# Patient Record
Sex: FEMALE | Race: WHITE | NOT HISPANIC OR LATINO | Employment: OTHER | URBAN - METROPOLITAN AREA
[De-identification: names, ages, dates, MRNs, and addresses within clinical notes are randomized per-mention and may not be internally consistent; named-entity substitution may affect disease eponyms.]

---

## 2017-03-20 DIAGNOSIS — Z12.31 ENCOUNTER FOR SCREENING MAMMOGRAM FOR MALIGNANT NEOPLASM OF BREAST: ICD-10-CM

## 2017-03-22 ENCOUNTER — ALLSCRIPTS OFFICE VISIT (OUTPATIENT)
Dept: OTHER | Facility: OTHER | Age: 72
End: 2017-03-22

## 2017-03-22 ENCOUNTER — LAB REQUISITION (OUTPATIENT)
Dept: LAB | Facility: HOSPITAL | Age: 72
End: 2017-03-22
Payer: MEDICARE

## 2017-03-22 DIAGNOSIS — Z01.419 ENCOUNTER FOR GYNECOLOGICAL EXAMINATION WITHOUT ABNORMAL FINDING: ICD-10-CM

## 2017-03-22 PROCEDURE — G0145 SCR C/V CYTO,THINLAYER,RESCR: HCPCS | Performed by: OBSTETRICS & GYNECOLOGY

## 2017-03-30 LAB
LAB AP GYN PRIMARY INTERPRETATION: NORMAL
Lab: NORMAL

## 2017-07-28 ENCOUNTER — HOSPITAL ENCOUNTER (OUTPATIENT)
Dept: MAMMOGRAPHY | Facility: MEDICAL CENTER | Age: 72
Discharge: HOME | End: 2017-07-28
Payer: MEDICARE

## 2017-07-28 DIAGNOSIS — Z12.31 ENCOUNTER FOR SCREENING MAMMOGRAM FOR MALIGNANT NEOPLASM OF BREAST: ICD-10-CM

## 2017-07-28 PROCEDURE — G0202 SCR MAMMO BI INCL CAD: HCPCS

## 2018-01-05 ENCOUNTER — TRANSCRIBE ORDERS (OUTPATIENT)
Dept: RADIOLOGY | Facility: MEDICAL CENTER | Age: 73
End: 2018-01-05

## 2018-01-05 ENCOUNTER — GENERIC CONVERSION - ENCOUNTER (OUTPATIENT)
Dept: OTHER | Facility: OTHER | Age: 73
End: 2018-01-05

## 2018-01-05 ENCOUNTER — APPOINTMENT (OUTPATIENT)
Dept: LAB | Facility: MEDICAL CENTER | Age: 73
End: 2018-01-05
Payer: MEDICARE

## 2018-01-05 ENCOUNTER — APPOINTMENT (OUTPATIENT)
Dept: RADIOLOGY | Facility: MEDICAL CENTER | Age: 73
End: 2018-01-05
Payer: MEDICARE

## 2018-01-05 DIAGNOSIS — N20.0 CALCULUS OF KIDNEY: ICD-10-CM

## 2018-01-05 DIAGNOSIS — M1A.9XX0 CHRONIC GOUT WITHOUT TOPHUS, UNSPECIFIED CAUSE, UNSPECIFIED SITE: ICD-10-CM

## 2018-01-05 DIAGNOSIS — M1A.9XX0 CHRONIC GOUT WITHOUT TOPHUS, UNSPECIFIED CAUSE, UNSPECIFIED SITE: Primary | ICD-10-CM

## 2018-01-05 LAB
ALBUMIN SERPL BCP-MCNC: 4 G/DL (ref 3.5–5)
ALP SERPL-CCNC: 69 U/L (ref 46–116)
ALT SERPL W P-5'-P-CCNC: 34 U/L (ref 12–78)
ANION GAP SERPL CALCULATED.3IONS-SCNC: 5 MMOL/L (ref 4–13)
AST SERPL W P-5'-P-CCNC: 23 U/L (ref 5–45)
BASOPHILS # BLD AUTO: 0.03 THOUSANDS/ΜL (ref 0–0.1)
BASOPHILS NFR BLD AUTO: 1 % (ref 0–1)
BILIRUB SERPL-MCNC: 0.6 MG/DL (ref 0.2–1)
BUN SERPL-MCNC: 13 MG/DL (ref 5–25)
CALCIUM SERPL-MCNC: 9.4 MG/DL (ref 8.3–10.1)
CHLORIDE SERPL-SCNC: 102 MMOL/L (ref 100–108)
CO2 SERPL-SCNC: 31 MMOL/L (ref 21–32)
CREAT SERPL-MCNC: 0.86 MG/DL (ref 0.6–1.3)
EOSINOPHIL # BLD AUTO: 0.22 THOUSAND/ΜL (ref 0–0.61)
EOSINOPHIL NFR BLD AUTO: 4 % (ref 0–6)
ERYTHROCYTE [DISTWIDTH] IN BLOOD BY AUTOMATED COUNT: 13.5 % (ref 11.6–15.1)
GFR SERPL CREATININE-BSD FRML MDRD: 69 ML/MIN/1.73SQ M
GLUCOSE P FAST SERPL-MCNC: 110 MG/DL (ref 65–99)
HCT VFR BLD AUTO: 45.7 % (ref 34.8–46.1)
HGB BLD-MCNC: 15.2 G/DL (ref 11.5–15.4)
LYMPHOCYTES # BLD AUTO: 1.97 THOUSANDS/ΜL (ref 0.6–4.47)
LYMPHOCYTES NFR BLD AUTO: 32 % (ref 14–44)
MCH RBC QN AUTO: 32.1 PG (ref 26.8–34.3)
MCHC RBC AUTO-ENTMCNC: 33.3 G/DL (ref 31.4–37.4)
MCV RBC AUTO: 97 FL (ref 82–98)
MONOCYTES # BLD AUTO: 0.45 THOUSAND/ΜL (ref 0.17–1.22)
MONOCYTES NFR BLD AUTO: 7 % (ref 4–12)
NEUTROPHILS # BLD AUTO: 3.45 THOUSANDS/ΜL (ref 1.85–7.62)
NEUTS SEG NFR BLD AUTO: 56 % (ref 43–75)
NRBC BLD AUTO-RTO: 0 /100 WBCS
PLATELET # BLD AUTO: 263 THOUSANDS/UL (ref 149–390)
PMV BLD AUTO: 11.5 FL (ref 8.9–12.7)
POTASSIUM SERPL-SCNC: 3.9 MMOL/L (ref 3.5–5.3)
PROT SERPL-MCNC: 7.8 G/DL (ref 6.4–8.2)
RBC # BLD AUTO: 4.73 MILLION/UL (ref 3.81–5.12)
SODIUM SERPL-SCNC: 138 MMOL/L (ref 136–145)
URATE SERPL-MCNC: 5.6 MG/DL (ref 2–6.8)
WBC # BLD AUTO: 6.13 THOUSAND/UL (ref 4.31–10.16)

## 2018-01-05 PROCEDURE — 36415 COLL VENOUS BLD VENIPUNCTURE: CPT

## 2018-01-05 PROCEDURE — 84550 ASSAY OF BLOOD/URIC ACID: CPT

## 2018-01-05 PROCEDURE — 74018 RADEX ABDOMEN 1 VIEW: CPT

## 2018-01-05 PROCEDURE — 80053 COMPREHEN METABOLIC PANEL: CPT

## 2018-01-05 PROCEDURE — 85025 COMPLETE CBC W/AUTO DIFF WBC: CPT

## 2018-01-15 ENCOUNTER — ALLSCRIPTS OFFICE VISIT (OUTPATIENT)
Dept: OTHER | Facility: OTHER | Age: 73
End: 2018-01-15

## 2018-01-15 ENCOUNTER — TRANSCRIBE ORDERS (OUTPATIENT)
Dept: ADMINISTRATIVE | Facility: HOSPITAL | Age: 73
End: 2018-01-15

## 2018-01-15 VITALS
DIASTOLIC BLOOD PRESSURE: 80 MMHG | SYSTOLIC BLOOD PRESSURE: 128 MMHG | HEIGHT: 67 IN | BODY MASS INDEX: 34.1 KG/M2 | WEIGHT: 217.25 LBS

## 2018-01-15 DIAGNOSIS — N20.0 URIC ACID NEPHROLITHIASIS: Primary | ICD-10-CM

## 2018-01-15 LAB
BILIRUB UR QL STRIP: NORMAL
CLARITY UR: NORMAL
COLOR UR: YELLOW
GLUCOSE (HISTORICAL): NORMAL
HGB UR QL STRIP.AUTO: NORMAL
KETONES UR STRIP-MCNC: NORMAL MG/DL
LEUKOCYTE ESTERASE UR QL STRIP: NORMAL
NITRITE UR QL STRIP: NORMAL
PH UR STRIP.AUTO: 5 [PH]
PROT UR STRIP-MCNC: NORMAL MG/DL
SP GR UR STRIP.AUTO: 1.01
UROBILINOGEN UR QL STRIP.AUTO: NORMAL

## 2018-01-16 NOTE — CONSULTS
Assessment   1  Nephrolithiasis (592 0) (N20 0)    Plan   Nephrolithiasis    · * XR ABDOMEN 1 VIEW KUB; Status:Active; Requested XMO:[id number];    Perform:[facility]; Due:02Myh5670;Ordered;For:Nephrolithiasis; Ordered By:Jesica Fu;   · Urine Dip Non-Automated- POC; Status:Complete - Retrospective By Protocol    Authorization;   Done: 48CWX1786 08:59AM   Performed: In Office; OSL:[id number]; Last Updated Isabel Rosenberg; 1/15/2018 8:59:58 AM;Ordered;For:Nephrolithiasis; Ordered By:Jesica Fu;   · [address] [place]; Status:Hold For - Scheduling; Requested XGS:[id number];    Perform:[facility]; Order Comments: With ureteral jets please; Due:67Npc4834;Ordered;For:Nephrolithiasis; Ordered By:Jesica Fu;   · Follow-up visit in 1 year Evaluation and Treatment  Follow-up  Status: Hold For -    Scheduling  Requested for: 33ZFB1047   Ordered; For: Nephrolithiasis; Ordered By: Jennifer Mckay Performed:  Due: 22IHY1585    Discussion/Summary   Discussion Summary:    Patient has a nonobstructing left-sided renal calculus that measures 3 mm on this years KUB  This stone has been present since 2009  The patient has never had a stone episode or required stone surgery in the past  However, her  is a frequent stone former and she is concerned about her stone  She would like routine surveillance and so I have recommended following her in 1 year with a KUB and ultrasound  have discussed stone precautions  reviewed with the patient urgent stones symptoms that should prompt emergent evaluation  These include intractable flank pain that does not respond to oral medications, nausea and vomiting that prevents intake of oral fluids, fevers greater than 100 3 measured by thermometer, or large amounts of blood in the urine  reviewed with the patient some general lifestyle and dietary modifications that can improve and prevent stone formation   This includes a large volume of water intake, approximately 1 5-2 L per day  Addition of citric acid with homemade lemonade or orange juice can help to it inhibit stone formation  Decreased sodium in the form of table salt and as sodium in prepared foods as well as decreased animal protein with approximately 1 palm-sized portion per meal can both lead to decrease in overall stone formation  the patient is able to maintain some of these lifestyle modifications and still has recurrent stone formation we discussed the need for a metabolic stone evaluation  patient is taking calcium supplementation for her osteopenia  I have recommended calcium citrate  will see her in 1 year  She knows to call me sooner with questions or concerns  Patient's Capacity to Self-Care: Patient is able to Self-Care  Goals and Barriers: The patient has the current Goals: Continued surveillance of her nonobstructing left-sided renal calculus  The patent has the current Barriers:    Self Referrals:    Self Referrals: Yes  is a pt      Chief Complaint   Chief Complaint Free Text Note Form: Pt here for nephrolithiasis      History of Present Illness   HPI: 51-year-old female with a history of a small nonobstructing left-sided stone  She was previously seen by an outside urologist  She has never passed stones that she has recognized or required stone surgery  She presents today to establish urologic care  Review of Systems   Complete-Female Urology:      Genitourinary: Empty sensation-- and-- stream quality good, but-- no dysuria,-- no urinary hesitancy,-- no feelings of urinary urgency,-- no incontinence,-- no hematuria-- and-- no nocturia  ROS Reviewed:    ROS reviewed  Active Problems   1  Decreased libido (799 81) (R68 82)   2  Encounter for routine gynecological examination with Papanicolaou smear of cervix     (V72 31,V76 2) (Z01 419)   3  Encounter for screening mammogram for malignant neoplasm of breast (V76 12)     (Z12 31)   4  Lichen sclerosus (008 0) (L90 0)   5  Nephrolithiasis (592 0) (N20 0)   6  Osteopenia (733 90) (M85 80)   7  Routine Gynecological Exam With Cervical Pap Smear (V72 31)   8  Visit for screening mammogram (V76 12) (Z12 31)    Past Medical History   1  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)   2  History of Gout (274 9) (M10 9)   3  History of hypertension (V12 59) (Z86 79)   4  History of hypothyroidism (V12 29) (Z86 39)   5  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)   6  History of Summary Of Previous Pregnancies  3  (Total No )   7  History of Summary Of Previous Pregnancies Para 3  (Deliveries)  Active Problems And Past Medical History Reviewed: The active problems and past medical history were reviewed and updated today  Surgical History   1  History of Colonoscopy (Fiberoptic) Screening   2  History of Gallbladder Surgery   3  History of Salpingo-oophorectomy Right Side   4  History of Tubal Ligation   5  History of Varicose Vein Ligation  Surgical History Reviewed: The surgical history was reviewed and updated today  Family History   Family History    1  Family history of Diabetes Mellitus (V18 0)   2  Family history of Heart Disease (V17 49)   3  Family history of Hypertension (V17 49)  Family History Reviewed: The family history was reviewed and updated today  Social History    · Child's Family History   · Exercising Regularly   · Marital History - Currently    · Never A Smoker  Social History Reviewed: The social history was reviewed and updated today  Current Meds    1  Adult Aspirin Low Strength 81 MG TBDP; Therapy: (Recorded:2018) to Recorded   2  Allopurinol 300 MG Oral Tablet Recorded   3  Betamethasone Dipropionate 0 05 % External Lotion; Therapy: (Recorded:2018) to Recorded   4  Blink Tears SOLN;     Therapy: (Recorded:2018) to Recorded   5   Clobetasol Propionate 0 05 % External Cream; APPLY SPARINGLY TO AFFECTED     AREA(S) TWICE DAILY; Therapy: 83SSB8572 to (Evaluate:48Fpw0630)  Requested for: 39MWH2967; Last     Rx:25Jan2017 Ordered   6  Fish Oil CAPS; Therapy: (Recorded:15Jan2018) to Recorded   7  Levoxyl 75 MCG Oral Tablet; Therapy: 51LWZ8157 to (Last Rx:85Fxd4520)  Requested for: 26Wdb3096 Ordered   8  Lisinopril-Hydrochlorothiazide 20-12 5 MG Oral Tablet; Therapy: 83FMK6338 to (Last Rx:85Awk9621)  Requested for: 44Wxy7194 Ordered   9  Loratadine CAPS; Therapy: (Recorded:15Jan2018) to Recorded   10  Multiple Vitamins Oral Tablet Recorded   11  Neomycin-Polymyxin-HC 3 5-45861-7 Otic Suspension; Therapy: 20YMR9728 to (Last AV:05GQN9240)  Requested for: 61WMR2251      Ordered   12  Oscal 500/200 D-3 500-200 MG-UNIT Oral Tablet; Therapy: (Recorded:15Jan2018) to Recorded   13  Restasis 0 05 % Ophthalmic Emulsion Recorded   14  SUMAtriptan Succinate 50 MG Oral Tablet; Therapy: 09QVB5866 to (Last NQ:09JJN7597)  Requested for: 75PVJ7396 Ordered   15  Vitamin C CAPS; Therapy: (Recorded:15Jan2018) to Recorded   16  Vitamin D CAPS; Therapy: (Recorded:15Jan2018) to Recorded  Medication List Reviewed: The medication list was reviewed and updated today  Allergies   1  Voltaren GEL   2  Penicillins  3  Gluten    Vitals   Vital Signs    Recorded: 10EZQ1432 08:50AM   Heart Rate 88   Systolic 922   Diastolic 80   Height 5 ft 6 in   Weight 213 lb    BMI Calculated 34 38   BSA Calculated 2 05     Physical Exam        Constitutional      General appearance: No acute distress, well appearing and well nourished  Pulmonary      Respiratory effort: No increased work of breathing or signs of respiratory distress  Cardiovascular      Examination of extremities for edema and/or varicosities: Normal        Abdomen      Abdomen: Non-tender, no masses         Musculoskeletal      Gait and station: Normal        Skin      Skin and subcutaneous tissue: Normal without rashes or lesions  Results/Data   Urine Dip Non-Automated- POC 12IWP6603 08:59AM Kinjal White      Test Name Result Flag Reference   Color Yellow     Clarity Transparent     Leukocytes -     Nitrite -     Blood -     Bilirubin -     Urobilinogen -     Protein -     Ph 5 0     Specific Gravity 1 015     Ketone -     Glucose -        (1) URIC ACID 07BYU0361 08:01AM EPIC, Provider   Test ordered by: Rachel Barrett      Test Name Result Flag Reference   URIC ACID 5 6 mg/dL  2 0-6 8   Specimen collection should occur prior to Metamizole administration due to the potential for falsely depressed results  (1) COMPREHENSIVE METABOLIC PANEL 32DPF6505 08:60OL EPIC, Provider   Test ordered by: Rachel Barrett      Test Name Result Flag Reference   SODIUM 138 mmol/L  136-145   POTASSIUM 3 9 mmol/L  3 5-5 3   CHLORIDE 102 mmol/L  100-108   CARBON DIOXIDE 31 mmol/L  21-32   ANION GAP (CALC) 5 mmol/L  4-13   BLOOD UREA NITROGEN 13 mg/dL  5-25   CREATININE 0 86 mg/dL  0 60-1 30   Standardized to IDMS reference method   CALCIUM 9 4 mg/dL  8 3-10 1   BILI, TOTAL 0 60 mg/dL  0 20-1 00   ALK PHOSPHATAS 69 U/L     ALT (SGPT) 34 U/L  12-78   Specimen collection should occur prior to Sulfasalazine and/or Sulfapyridine administration due to the potential for falsely depressed results  AST(SGOT) 23 U/L  5-45   Specimen collection should occur prior to Sulfasalazine administration due to the potential for falsely depressed results  ALBUMIN 4 0 g/dL  3 5-5 0   TOTAL PROTEIN 7 8 g/dL  6 4-8 2   eGFR 69 ml/min/1 73sq m     This is a patient instruction: Patient fasting for 8 hours or longer recommended  National Kidney Disease Education Program recommendations are as follows:     GFR calculation is accurate only with a steady state creatinine     Chronic Kidney disease less than 60 ml/min/1 73 sq  meters     Kidney failure less than 15 ml/min/1 73 sq  meters     GLUCOSE FASTING 110 mg/dL H 65-99   Specimen collection should occur prior to Sulfasalazine administration due to the potential for falsely depressed results  Specimen collection should occur prior to Sulfapyridine administration due to the potential for falsely elevated results  * XR ABDOMEN 1 VIEW KUB 34FGA7148 08:00AM EPIC, Provider   Test ordered by: Dominique Hodges      Test Name Result Flag Reference   XR ABDOMEN 1 VIEW KUB (Report)     ABDOMEN           INDICATION: History of kidney stones  COMPARISON: 9/11/2012           VIEWS: AP supine           IMAGES: 2           FINDINGS:           There is a nonobstructive bowel gas pattern  No discernible free air on this supine study  Upright or left lateral decubitus imaging is more sensitive to detect subtle free air in the appropriate setting  Calcified granulomas projecting over the liver  Prior cholecystectomy  Rounded calcification projecting over the left renal upper pole, the largest measuring 3 mm  No stones demonstrated along the expected courses of the ureters  Pelvic phleboliths       are unchanged in appearance without new stone concern for distal ureterolith  Visualized lung bases are clear  Lumbar spine degenerative changes  IMPRESSION:           Left nephrolithiasis  No ureteral stones or other acute findings                  Workstation performed: [id number]           Signed by:      Cata Stacy MD      1/5/18      Signatures    Electronically signed by : JORJE Schmidt ; Clem 15 2018  9:12AM EST                       (Author)

## 2018-01-22 VITALS
SYSTOLIC BLOOD PRESSURE: 142 MMHG | BODY MASS INDEX: 34.23 KG/M2 | HEIGHT: 66 IN | WEIGHT: 213 LBS | DIASTOLIC BLOOD PRESSURE: 80 MMHG | HEART RATE: 88 BPM

## 2018-03-30 ENCOUNTER — ANNUAL EXAM (OUTPATIENT)
Dept: GYNECOLOGY | Facility: CLINIC | Age: 73
End: 2018-03-30
Payer: MEDICARE

## 2018-03-30 VITALS
WEIGHT: 214.6 LBS | DIASTOLIC BLOOD PRESSURE: 88 MMHG | SYSTOLIC BLOOD PRESSURE: 130 MMHG | BODY MASS INDEX: 34.49 KG/M2 | HEIGHT: 66 IN

## 2018-03-30 DIAGNOSIS — M81.0 OSTEOPOROSIS, UNSPECIFIED OSTEOPOROSIS TYPE, UNSPECIFIED PATHOLOGICAL FRACTURE PRESENCE: Primary | ICD-10-CM

## 2018-03-30 DIAGNOSIS — Z78.0 MENOPAUSE: ICD-10-CM

## 2018-03-30 DIAGNOSIS — L90.0 LICHEN SCLEROSUS: ICD-10-CM

## 2018-03-30 DIAGNOSIS — Z12.31 ENCOUNTER FOR SCREENING MAMMOGRAM FOR MALIGNANT NEOPLASM OF BREAST: ICD-10-CM

## 2018-03-30 PROCEDURE — 99213 OFFICE O/P EST LOW 20 MIN: CPT | Performed by: OBSTETRICS & GYNECOLOGY

## 2018-03-30 RX ORDER — NEOMYCIN SULFATE, POLYMYXIN B SULFATE AND HYDROCORTISONE 10; 3.5; 1 MG/ML; MG/ML; [USP'U]/ML
SUSPENSION/ DROPS AURICULAR (OTIC)
COMMUNITY
Start: 2011-06-03

## 2018-03-30 RX ORDER — SUMATRIPTAN 50 MG/1
TABLET, FILM COATED ORAL
COMMUNITY
Start: 2011-09-27

## 2018-03-30 RX ORDER — LEVOTHYROXINE SODIUM 0.07 MG/1
TABLET ORAL
COMMUNITY
Start: 2018-01-12

## 2018-03-30 RX ORDER — LISINOPRIL AND HYDROCHLOROTHIAZIDE 12.5; 1 MG/1; MG/1
TABLET ORAL
COMMUNITY
Start: 2018-02-22

## 2018-03-30 RX ORDER — CLOBETASOL PROPIONATE 0.5 MG/G
CREAM TOPICAL 2 TIMES DAILY
Qty: 30 G | Refills: 3 | Status: SHIPPED | OUTPATIENT
Start: 2018-03-30 | End: 2020-11-27 | Stop reason: SDUPTHER

## 2018-03-30 RX ORDER — ALLOPURINOL 100 MG/1
TABLET ORAL
COMMUNITY
Start: 2018-02-22

## 2018-03-30 RX ORDER — CLOBETASOL PROPIONATE 0.5 MG/G
CREAM TOPICAL 2 TIMES DAILY
COMMUNITY
Start: 2016-01-11 | End: 2018-03-30 | Stop reason: SDUPTHER

## 2018-03-30 NOTE — PROGRESS NOTES
Assessment/Plan:         Diagnoses and all orders for this visit:    Osteoporosis, unspecified osteoporosis type, unspecified pathological fracture presence  -     Cancel: DXA bone density spine hip and pelvis; Future    Menopause  -     Cancel: DXA bone density spine hip and pelvis; Future    Encounter for screening mammogram for malignant neoplasm of breast  -     Mammo screening bilateral w 3d & cad; Future    Lichen sclerosus  -     clobetasol (TEMOVATE) 0 05 % cream; Apply topically 2 (two) times a day for 14 days    Other orders  -     Discontinue: clobetasol (TEMOVATE) 0 05 % cream; Apply topically 2 (two) times a day  -     allopurinol (ZYLOPRIM) 100 mg tablet;   -     Aspirin 81 MG EC tablet; Take by mouth  -     Omega-3 Fatty Acids (FISH OIL) 645 MG CAPS; Take by mouth  -     levothyroxine 75 mcg tablet;   -     lisinopril-hydrochlorothiazide (PRINZIDE,ZESTORETIC) 10-12 5 MG per tablet;   -     neomycin-polymyxin-hydrocortisone (CORTISPORIN) 0 35%-10,000 units/mL-1% otic suspension; Administer into ears  -     SUMAtriptan (IMITREX) 50 mg tablet; Take by mouth        Subjective:      Patient ID: Ciro Found is a 72 y o  female  Has lichen sclerosus  Gets flare approximately 3-4 times/ year  Presently having flare  Responds well to clobetasol  No vaginal discharge, bleeding, fever  No urinary complaints  No abdominal pain    HPI    The following portions of the patient's history were reviewed and updated as appropriate: allergies, current medications, past family history, past medical history, past social history, past surgical history and problem list     Review of Systems   Constitutional: Negative  Gastrointestinal: Negative  Genitourinary: Negative for difficulty urinating, dysuria, frequency, hematuria, pelvic pain, urgency, vaginal bleeding, vaginal discharge and vaginal pain     Skin:        Lichen sclerosus         Objective:      /88 (BP Location: Right arm, Patient Position: Sitting, Cuff Size: Standard)   Ht 5' 6 14" (1 68 m)   Wt 97 3 kg (214 lb 9 6 oz)   BMI 34 49 kg/m²          Physical Exam   Constitutional: She appears well-developed and well-nourished  Neck: Normal range of motion  Neck supple  No thyromegaly present  Cardiovascular: Normal rate and regular rhythm  Pulmonary/Chest: Effort normal and breath sounds normal  Right breast exhibits no inverted nipple, no mass, no nipple discharge, no skin change and no tenderness  Left breast exhibits no inverted nipple, no mass, no nipple discharge, no skin change and no tenderness  Abdominal: Soft  Bowel sounds are normal  She exhibits no mass  There is no tenderness  There is no rebound  Hernia confirmed negative in the right inguinal area and confirmed negative in the left inguinal area  Genitourinary: Uterus normal  There is no rash or lesion on the right labia  There is no rash or lesion on the left labia  Cervix exhibits no motion tenderness, no discharge and no friability  Right adnexum displays no mass, no tenderness and no fullness  Left adnexum displays no mass, no tenderness and no fullness  There is erythema in the vagina  No tenderness or bleeding in the vagina  No vaginal discharge found  Genitourinary Comments: Present lichen flare   Lymphadenopathy:        Right: No inguinal adenopathy present  Left: No inguinal adenopathy present

## 2018-04-03 ENCOUNTER — APPOINTMENT (OUTPATIENT)
Dept: LAB | Facility: MEDICAL CENTER | Age: 73
End: 2018-04-03
Payer: MEDICARE

## 2018-04-03 ENCOUNTER — TRANSCRIBE ORDERS (OUTPATIENT)
Dept: RADIOLOGY | Facility: MEDICAL CENTER | Age: 73
End: 2018-04-03

## 2018-04-03 DIAGNOSIS — E07.9 THYROID DISORDER: ICD-10-CM

## 2018-04-03 DIAGNOSIS — I10 HYPERTENSION, UNSPECIFIED TYPE: ICD-10-CM

## 2018-04-03 DIAGNOSIS — I10 HYPERTENSION, UNSPECIFIED TYPE: Primary | ICD-10-CM

## 2018-04-03 LAB
25(OH)D3 SERPL-MCNC: 25.3 NG/ML (ref 30–100)
CHOLEST SERPL-MCNC: 154 MG/DL (ref 50–200)
ERYTHROCYTE [DISTWIDTH] IN BLOOD BY AUTOMATED COUNT: 13.7 % (ref 11.6–15.1)
EST. AVERAGE GLUCOSE BLD GHB EST-MCNC: 126 MG/DL
HBA1C MFR BLD: 6 % (ref 4.2–6.3)
HCT VFR BLD AUTO: 42 % (ref 34.8–46.1)
HDLC SERPL-MCNC: 35 MG/DL (ref 40–60)
HGB BLD-MCNC: 14.2 G/DL (ref 11.5–15.4)
LDLC SERPL CALC-MCNC: 94 MG/DL (ref 0–100)
MCH RBC QN AUTO: 32.2 PG (ref 26.8–34.3)
MCHC RBC AUTO-ENTMCNC: 33.8 G/DL (ref 31.4–37.4)
MCV RBC AUTO: 95 FL (ref 82–98)
PLATELET # BLD AUTO: 253 THOUSANDS/UL (ref 149–390)
PMV BLD AUTO: 11.4 FL (ref 8.9–12.7)
RBC # BLD AUTO: 4.41 MILLION/UL (ref 3.81–5.12)
T4 FREE SERPL-MCNC: 0.9 NG/DL (ref 0.76–1.46)
TRIGL SERPL-MCNC: 125 MG/DL
TSH SERPL DL<=0.05 MIU/L-ACNC: 1.98 UIU/ML (ref 0.36–3.74)
WBC # BLD AUTO: 6.82 THOUSAND/UL (ref 4.31–10.16)

## 2018-04-03 PROCEDURE — 84439 ASSAY OF FREE THYROXINE: CPT

## 2018-04-03 PROCEDURE — 85027 COMPLETE CBC AUTOMATED: CPT

## 2018-04-03 PROCEDURE — 83036 HEMOGLOBIN GLYCOSYLATED A1C: CPT

## 2018-04-03 PROCEDURE — 82306 VITAMIN D 25 HYDROXY: CPT

## 2018-04-03 PROCEDURE — 84443 ASSAY THYROID STIM HORMONE: CPT

## 2018-04-03 PROCEDURE — 80061 LIPID PANEL: CPT

## 2018-04-03 PROCEDURE — 36415 COLL VENOUS BLD VENIPUNCTURE: CPT

## 2018-04-18 ENCOUNTER — APPOINTMENT (OUTPATIENT)
Dept: LAB | Facility: MEDICAL CENTER | Age: 73
End: 2018-04-18
Payer: MEDICARE

## 2018-04-18 ENCOUNTER — TRANSCRIBE ORDERS (OUTPATIENT)
Dept: RADIOLOGY | Facility: MEDICAL CENTER | Age: 73
End: 2018-04-18

## 2018-04-18 DIAGNOSIS — K90.9 INTESTINAL MALABSORPTION, UNSPECIFIED TYPE: Primary | ICD-10-CM

## 2018-04-18 DIAGNOSIS — K90.9 INTESTINAL MALABSORPTION, UNSPECIFIED TYPE: ICD-10-CM

## 2018-04-18 PROCEDURE — 83516 IMMUNOASSAY NONANTIBODY: CPT

## 2018-04-18 PROCEDURE — 82784 ASSAY IGA/IGD/IGG/IGM EACH: CPT

## 2018-04-18 PROCEDURE — 36415 COLL VENOUS BLD VENIPUNCTURE: CPT

## 2018-04-18 PROCEDURE — 86255 FLUORESCENT ANTIBODY SCREEN: CPT

## 2018-04-19 LAB
ENDOMYSIUM IGA SER QL: NEGATIVE
GLIADIN PEPTIDE IGA SER-ACNC: 8 UNITS (ref 0–19)
GLIADIN PEPTIDE IGG SER-ACNC: 4 UNITS (ref 0–19)
IGA SERPL-MCNC: 210 MG/DL (ref 64–422)
TTG IGA SER-ACNC: 2 U/ML (ref 0–3)
TTG IGA SER-ACNC: <2 U/ML (ref 0–3)
TTG IGG SER-ACNC: <2 U/ML (ref 0–5)
TTG IGG SER-ACNC: <2 U/ML (ref 0–5)

## 2018-09-07 ENCOUNTER — HOSPITAL ENCOUNTER (OUTPATIENT)
Dept: MAMMOGRAPHY | Facility: HOSPITAL | Age: 73
Discharge: HOME | End: 2018-09-07
Payer: MEDICARE

## 2018-09-07 DIAGNOSIS — Z12.31 ENCOUNTER FOR SCREENING MAMMOGRAM FOR MALIGNANT NEOPLASM OF BREAST: ICD-10-CM

## 2018-09-07 PROCEDURE — 77067 SCR MAMMO BI INCL CAD: CPT

## 2018-09-07 PROCEDURE — 77063 BREAST TOMOSYNTHESIS BI: CPT

## 2018-11-09 ENCOUNTER — APPOINTMENT (OUTPATIENT)
Dept: LAB | Facility: MEDICAL CENTER | Age: 73
End: 2018-11-09
Payer: MEDICARE

## 2018-11-09 ENCOUNTER — TRANSCRIBE ORDERS (OUTPATIENT)
Dept: RADIOLOGY | Facility: MEDICAL CENTER | Age: 73
End: 2018-11-09

## 2018-11-09 DIAGNOSIS — M1A.9XX0 CHRONIC GOUT WITHOUT TOPHUS, UNSPECIFIED CAUSE, UNSPECIFIED SITE: ICD-10-CM

## 2018-11-09 DIAGNOSIS — M1A.9XX0 CHRONIC GOUT WITHOUT TOPHUS, UNSPECIFIED CAUSE, UNSPECIFIED SITE: Primary | ICD-10-CM

## 2018-11-09 LAB
ALBUMIN SERPL BCP-MCNC: 4 G/DL (ref 3.5–5)
ALP SERPL-CCNC: 68 U/L (ref 46–116)
ALT SERPL W P-5'-P-CCNC: 42 U/L (ref 12–78)
ANION GAP SERPL CALCULATED.3IONS-SCNC: 4 MMOL/L (ref 4–13)
AST SERPL W P-5'-P-CCNC: 26 U/L (ref 5–45)
BASOPHILS # BLD AUTO: 0.05 THOUSANDS/ΜL (ref 0–0.1)
BASOPHILS NFR BLD AUTO: 1 % (ref 0–1)
BILIRUB SERPL-MCNC: 0.49 MG/DL (ref 0.2–1)
BUN SERPL-MCNC: 14 MG/DL (ref 5–25)
CALCIUM SERPL-MCNC: 9.6 MG/DL (ref 8.3–10.1)
CHLORIDE SERPL-SCNC: 104 MMOL/L (ref 100–108)
CO2 SERPL-SCNC: 29 MMOL/L (ref 21–32)
CREAT SERPL-MCNC: 0.84 MG/DL (ref 0.6–1.3)
EOSINOPHIL # BLD AUTO: 0.35 THOUSAND/ΜL (ref 0–0.61)
EOSINOPHIL NFR BLD AUTO: 5 % (ref 0–6)
ERYTHROCYTE [DISTWIDTH] IN BLOOD BY AUTOMATED COUNT: 13.1 % (ref 11.6–15.1)
GFR SERPL CREATININE-BSD FRML MDRD: 70 ML/MIN/1.73SQ M
GLUCOSE P FAST SERPL-MCNC: 116 MG/DL (ref 65–99)
HCT VFR BLD AUTO: 44.8 % (ref 34.8–46.1)
HGB BLD-MCNC: 15 G/DL (ref 11.5–15.4)
IMM GRANULOCYTES # BLD AUTO: 0.02 THOUSAND/UL (ref 0–0.2)
IMM GRANULOCYTES NFR BLD AUTO: 0 % (ref 0–2)
LYMPHOCYTES # BLD AUTO: 2.41 THOUSANDS/ΜL (ref 0.6–4.47)
LYMPHOCYTES NFR BLD AUTO: 31 % (ref 14–44)
MCH RBC QN AUTO: 32.3 PG (ref 26.8–34.3)
MCHC RBC AUTO-ENTMCNC: 33.5 G/DL (ref 31.4–37.4)
MCV RBC AUTO: 97 FL (ref 82–98)
MONOCYTES # BLD AUTO: 0.56 THOUSAND/ΜL (ref 0.17–1.22)
MONOCYTES NFR BLD AUTO: 7 % (ref 4–12)
NEUTROPHILS # BLD AUTO: 4.41 THOUSANDS/ΜL (ref 1.85–7.62)
NEUTS SEG NFR BLD AUTO: 56 % (ref 43–75)
NRBC BLD AUTO-RTO: 0 /100 WBCS
PLATELET # BLD AUTO: 254 THOUSANDS/UL (ref 149–390)
PMV BLD AUTO: 10.9 FL (ref 8.9–12.7)
POTASSIUM SERPL-SCNC: 3.9 MMOL/L (ref 3.5–5.3)
PROT SERPL-MCNC: 7.8 G/DL (ref 6.4–8.2)
RBC # BLD AUTO: 4.64 MILLION/UL (ref 3.81–5.12)
SODIUM SERPL-SCNC: 137 MMOL/L (ref 136–145)
URATE SERPL-MCNC: 5.7 MG/DL (ref 2–6.8)
WBC # BLD AUTO: 7.8 THOUSAND/UL (ref 4.31–10.16)

## 2018-11-09 PROCEDURE — 36415 COLL VENOUS BLD VENIPUNCTURE: CPT

## 2018-11-09 PROCEDURE — 84550 ASSAY OF BLOOD/URIC ACID: CPT

## 2018-11-09 PROCEDURE — 80053 COMPREHEN METABOLIC PANEL: CPT

## 2018-11-09 PROCEDURE — 85025 COMPLETE CBC W/AUTO DIFF WBC: CPT

## 2019-01-11 ENCOUNTER — TRANSCRIBE ORDERS (OUTPATIENT)
Dept: ADMINISTRATIVE | Facility: HOSPITAL | Age: 74
End: 2019-01-11

## 2019-01-11 ENCOUNTER — APPOINTMENT (OUTPATIENT)
Dept: LAB | Facility: MEDICAL CENTER | Age: 74
End: 2019-01-11
Payer: MEDICARE

## 2019-01-11 DIAGNOSIS — I10 HYPERTENSION, UNSPECIFIED TYPE: Primary | ICD-10-CM

## 2019-01-11 DIAGNOSIS — E78.5 HYPERLIPIDEMIA, UNSPECIFIED HYPERLIPIDEMIA TYPE: ICD-10-CM

## 2019-01-11 DIAGNOSIS — E13.8 DIABETES MELLITUS OF OTHER TYPE WITH COMPLICATION, UNSPECIFIED WHETHER LONG TERM INSULIN USE: ICD-10-CM

## 2019-01-11 DIAGNOSIS — I10 HYPERTENSION, UNSPECIFIED TYPE: ICD-10-CM

## 2019-01-11 LAB
ANION GAP SERPL CALCULATED.3IONS-SCNC: 7 MMOL/L (ref 4–13)
BUN SERPL-MCNC: 14 MG/DL (ref 5–25)
CALCIUM SERPL-MCNC: 9.1 MG/DL (ref 8.3–10.1)
CHLORIDE SERPL-SCNC: 104 MMOL/L (ref 100–108)
CHOLEST SERPL-MCNC: 168 MG/DL (ref 50–200)
CO2 SERPL-SCNC: 27 MMOL/L (ref 21–32)
CREAT SERPL-MCNC: 0.81 MG/DL (ref 0.6–1.3)
EST. AVERAGE GLUCOSE BLD GHB EST-MCNC: 137 MG/DL
GFR SERPL CREATININE-BSD FRML MDRD: 73 ML/MIN/1.73SQ M
GLUCOSE P FAST SERPL-MCNC: 109 MG/DL (ref 65–99)
HBA1C MFR BLD: 6.4 % (ref 4.2–6.3)
HDLC SERPL-MCNC: 35 MG/DL (ref 40–60)
LDLC SERPL CALC-MCNC: 103 MG/DL (ref 0–100)
NONHDLC SERPL-MCNC: 133 MG/DL
POTASSIUM SERPL-SCNC: 3.9 MMOL/L (ref 3.5–5.3)
SODIUM SERPL-SCNC: 138 MMOL/L (ref 136–145)
T4 FREE SERPL-MCNC: 0.84 NG/DL (ref 0.76–1.46)
TRIGL SERPL-MCNC: 152 MG/DL
TSH SERPL DL<=0.05 MIU/L-ACNC: 1.74 UIU/ML (ref 0.36–3.74)

## 2019-01-11 PROCEDURE — 83036 HEMOGLOBIN GLYCOSYLATED A1C: CPT

## 2019-01-11 PROCEDURE — 84443 ASSAY THYROID STIM HORMONE: CPT

## 2019-01-11 PROCEDURE — 36415 COLL VENOUS BLD VENIPUNCTURE: CPT

## 2019-01-11 PROCEDURE — 84439 ASSAY OF FREE THYROXINE: CPT

## 2019-01-11 PROCEDURE — 80048 BASIC METABOLIC PNL TOTAL CA: CPT

## 2019-01-11 PROCEDURE — 80061 LIPID PANEL: CPT

## 2019-01-16 ENCOUNTER — HOSPITAL ENCOUNTER (OUTPATIENT)
Dept: RADIOLOGY | Facility: HOSPITAL | Age: 74
Discharge: HOME | End: 2019-01-16
Attending: UROLOGY
Payer: MEDICARE

## 2019-01-16 ENCOUNTER — HOSPITAL ENCOUNTER (OUTPATIENT)
Dept: ULTRASOUND IMAGING | Facility: HOSPITAL | Age: 74
Discharge: HOME | End: 2019-01-16
Attending: UROLOGY
Payer: MEDICARE

## 2019-01-16 DIAGNOSIS — N20.0 URIC ACID NEPHROLITHIASIS: ICD-10-CM

## 2019-01-16 DIAGNOSIS — N20.0 CALCULUS OF KIDNEY: ICD-10-CM

## 2019-01-16 PROCEDURE — 76770 US EXAM ABDO BACK WALL COMP: CPT

## 2019-01-16 PROCEDURE — 74018 RADEX ABDOMEN 1 VIEW: CPT

## 2019-01-25 PROBLEM — N20.0 NEPHROLITHIASIS: Status: ACTIVE | Noted: 2019-01-25

## 2019-01-28 ENCOUNTER — TELEPHONE (OUTPATIENT)
Dept: UROLOGY | Facility: MEDICAL CENTER | Age: 74
End: 2019-01-28

## 2019-01-28 NOTE — TELEPHONE ENCOUNTER
Pt calling- wanted to push out yearly, scheduled 3/26 at [facility]    Pt recently had X-Ray done 1/16, please advise if pt needs sooner apt   [phone]

## 2019-03-25 NOTE — PROGRESS NOTES
3/26/2019      Chief Complaint   Patient presents with    Nephrolithiasis       Assessment and Plan    69 y o  female managed by Dr Markell De La Rosa    1  Nephrolithiasis  - KUB and U/S reveals a stable 3mm left sided stone, a possible new left lower pole stone and bilateral right greater than left dilated right renal pelvis in the presence of ureteral jets   - discussed with the patient that her KUB and ultrasound are of no concern and we will recheck them again in 1 year  - proper hydration with 60 ounces lemon water daily     Follow up 1 year with KUB and ultrasound prior      History of Present Illness  Sana Dodson is a 69 y o  female here for follow up evaluation of nephrolithiasis  The patient presents today with a KUB and ultrasound obtained prior to her visit  Her KUB reveals a stable 3 millimeter left-sided renal calculi  Her ultrasound reveals a possible new lower pole stone, right greater than left dilated renal pelvis sees, and bilateral ureteral jets  The patient is asymptomatic and doing well from urinary standpoint  Her lower urinary tract symptoms are listed as below  Review of Systems   Constitutional: Negative for activity change, chills and fever  Gastrointestinal: Negative for abdominal distention and abdominal pain  Musculoskeletal: Negative for back pain and gait problem  Psychiatric/Behavioral: Negative for behavioral problems and confusion  Past Medical History  History reviewed  No pertinent past medical history  Past Social History  History reviewed  No pertinent surgical history  Social History     Tobacco Use   Smoking Status Never Smoker   Smokeless Tobacco Never Used       Past Family History  History reviewed  No pertinent family history      Past Social history  Social History     Socioeconomic History    Marital status: /Civil Union     Spouse name: Not on file    Number of children: Not on file    Years of education: Not on file    Highest education level: Not on file   Occupational History    Not on file   Social Needs    Financial resource strain: Not on file    Food insecurity:     Worry: Not on file     Inability: Not on file    Transportation needs:     Medical: Not on file     Non-medical: Not on file   Tobacco Use    Smoking status: Never Smoker    Smokeless tobacco: Never Used   Substance and Sexual Activity    Alcohol use: No    Drug use: No    Sexual activity: Not on file   Lifestyle    Physical activity:     Days per week: Not on file     Minutes per session: Not on file    Stress: Not on file   Relationships    Social connections:     Talks on phone: Not on file     Gets together: Not on file     Attends Mosque service: Not on file     Active member of club or organization: Not on file     Attends meetings of clubs or organizations: Not on file     Relationship status: Not on file    Intimate partner violence:     Fear of current or ex partner: Not on file     Emotionally abused: Not on file     Physically abused: Not on file     Forced sexual activity: Not on file   Other Topics Concern    Not on file   Social History Narrative    Not on file       Current Medications  Current Outpatient Medications   Medication Sig Dispense Refill    allopurinol (ZYLOPRIM) 100 mg tablet       Aspirin 81 MG EC tablet Take by mouth      levothyroxine 75 mcg tablet       lisinopril-hydrochlorothiazide (PRINZIDE,ZESTORETIC) 10-12 5 MG per tablet       neomycin-polymyxin-hydrocortisone (CORTISPORIN) 0 35%-10,000 units/mL-1% otic suspension Administer into ears      Omega-3 Fatty Acids (FISH OIL) 645 MG CAPS Take by mouth      SUMAtriptan (IMITREX) 50 mg tablet Take by mouth      clobetasol (TEMOVATE) 0 05 % cream Apply topically 2 (two) times a day for 14 days 30 g 3     No current facility-administered medications for this visit          Allergies  Allergies   Allergen Reactions    Gluten Meal     Diclofenac Rash    Penicillins Rash The following portions of the patient's history were reviewed and updated as appropriate: allergies, current medications, past medical history, past social history, past surgical history and problem list       Vitals  Vitals:    03/26/19 1327   BP: 124/70   Pulse: 71   Weight: 100 kg (221 lb)   Height: 5' 6" (1 676 m)         Physical Exam  Constitutional   General appearance: Patient is seated and in no acute distress, well appearing and well nourished  Head and Face   Head and face: Normal     Eyes   Conjunctiva and lids: No erythema, swelling or discharge  Ears, Nose, Mouth, and Throat   Hearing: Normal     Pulmonary   Respiratory effort: No increased work of breathing or signs of respiratory distress  Cardiovascular   Examination of extremities for edema and/or varicosities: Normal     Abdomen   Abdomen: Non-tender, no masses  Musculoskeletal   Gait and station: Normal    Skin   Skin and subcutaneous tissue: Warm, dry, and intact  No visible lesions or rashes  Psychiatric   Judgment and insight: Normal  Recent and remote memory:  Normal  Mood and affect: Normal      Results  No results found for this or any previous visit (from the past 1 hour(s))  ]  No results found for: PSA  Lab Results   Component Value Date    CALCIUM 9 1 01/11/2019    K 3 9 01/11/2019    CO2 27 01/11/2019     01/11/2019    BUN 14 01/11/2019    CREATININE 0 81 01/11/2019     Lab Results   Component Value Date    WBC 7 80 11/09/2018    HGB 15 0 11/09/2018    HCT 44 8 11/09/2018    MCV 97 11/09/2018     11/09/2018       Orders  No orders of the defined types were placed in this encounter

## 2019-03-26 ENCOUNTER — OFFICE VISIT (OUTPATIENT)
Dept: UROLOGY | Facility: CLINIC | Age: 74
End: 2019-03-26
Payer: MEDICARE

## 2019-03-26 VITALS
HEART RATE: 71 BPM | WEIGHT: 221 LBS | DIASTOLIC BLOOD PRESSURE: 70 MMHG | HEIGHT: 66 IN | SYSTOLIC BLOOD PRESSURE: 124 MMHG | BODY MASS INDEX: 35.52 KG/M2

## 2019-03-26 DIAGNOSIS — N20.0 NEPHROLITHIASIS: Primary | ICD-10-CM

## 2019-03-26 PROCEDURE — 99213 OFFICE O/P EST LOW 20 MIN: CPT | Performed by: PHYSICIAN ASSISTANT

## 2019-05-13 ENCOUNTER — TRANSCRIBE ORDERS (OUTPATIENT)
Dept: ADMINISTRATIVE | Facility: HOSPITAL | Age: 74
End: 2019-05-13

## 2019-05-13 ENCOUNTER — APPOINTMENT (OUTPATIENT)
Dept: LAB | Facility: MEDICAL CENTER | Age: 74
End: 2019-05-13
Payer: MEDICARE

## 2019-05-13 DIAGNOSIS — E74.39 GLUCOSE INTOLERANCE: ICD-10-CM

## 2019-05-13 DIAGNOSIS — M1A.9XX0 CHRONIC GOUT WITHOUT TOPHUS, UNSPECIFIED CAUSE, UNSPECIFIED SITE: Primary | ICD-10-CM

## 2019-05-13 DIAGNOSIS — M1A.9XX0 CHRONIC GOUT WITHOUT TOPHUS, UNSPECIFIED CAUSE, UNSPECIFIED SITE: ICD-10-CM

## 2019-05-13 LAB
ALBUMIN SERPL BCP-MCNC: 3.9 G/DL (ref 3.5–5)
ALP SERPL-CCNC: 57 U/L (ref 46–116)
ALT SERPL W P-5'-P-CCNC: 44 U/L (ref 12–78)
ANION GAP SERPL CALCULATED.3IONS-SCNC: 4 MMOL/L (ref 4–13)
AST SERPL W P-5'-P-CCNC: 22 U/L (ref 5–45)
BASOPHILS # BLD AUTO: 0.06 THOUSANDS/ΜL (ref 0–0.1)
BASOPHILS NFR BLD AUTO: 1 % (ref 0–1)
BILIRUB SERPL-MCNC: 0.36 MG/DL (ref 0.2–1)
BUN SERPL-MCNC: 18 MG/DL (ref 5–25)
CALCIUM SERPL-MCNC: 8.7 MG/DL (ref 8.3–10.1)
CHLORIDE SERPL-SCNC: 106 MMOL/L (ref 100–108)
CO2 SERPL-SCNC: 28 MMOL/L (ref 21–32)
CREAT SERPL-MCNC: 0.82 MG/DL (ref 0.6–1.3)
EOSINOPHIL # BLD AUTO: 0.28 THOUSAND/ΜL (ref 0–0.61)
EOSINOPHIL NFR BLD AUTO: 4 % (ref 0–6)
ERYTHROCYTE [DISTWIDTH] IN BLOOD BY AUTOMATED COUNT: 13 % (ref 11.6–15.1)
EST. AVERAGE GLUCOSE BLD GHB EST-MCNC: 128 MG/DL
GFR SERPL CREATININE-BSD FRML MDRD: 72 ML/MIN/1.73SQ M
GLUCOSE P FAST SERPL-MCNC: 119 MG/DL (ref 65–99)
HBA1C MFR BLD: 6.1 % (ref 4.2–6.3)
HCT VFR BLD AUTO: 44 % (ref 34.8–46.1)
HGB BLD-MCNC: 14.5 G/DL (ref 11.5–15.4)
IMM GRANULOCYTES # BLD AUTO: 0.02 THOUSAND/UL (ref 0–0.2)
IMM GRANULOCYTES NFR BLD AUTO: 0 % (ref 0–2)
LYMPHOCYTES # BLD AUTO: 2.21 THOUSANDS/ΜL (ref 0.6–4.47)
LYMPHOCYTES NFR BLD AUTO: 33 % (ref 14–44)
MCH RBC QN AUTO: 32.1 PG (ref 26.8–34.3)
MCHC RBC AUTO-ENTMCNC: 33 G/DL (ref 31.4–37.4)
MCV RBC AUTO: 97 FL (ref 82–98)
MONOCYTES # BLD AUTO: 0.52 THOUSAND/ΜL (ref 0.17–1.22)
MONOCYTES NFR BLD AUTO: 8 % (ref 4–12)
NEUTROPHILS # BLD AUTO: 3.6 THOUSANDS/ΜL (ref 1.85–7.62)
NEUTS SEG NFR BLD AUTO: 54 % (ref 43–75)
NRBC BLD AUTO-RTO: 0 /100 WBCS
PLATELET # BLD AUTO: 232 THOUSANDS/UL (ref 149–390)
PMV BLD AUTO: 10.8 FL (ref 8.9–12.7)
POTASSIUM SERPL-SCNC: 3.9 MMOL/L (ref 3.5–5.3)
PROT SERPL-MCNC: 7.6 G/DL (ref 6.4–8.2)
RBC # BLD AUTO: 4.52 MILLION/UL (ref 3.81–5.12)
SODIUM SERPL-SCNC: 138 MMOL/L (ref 136–145)
URATE SERPL-MCNC: 5.5 MG/DL (ref 2–6.8)
WBC # BLD AUTO: 6.69 THOUSAND/UL (ref 4.31–10.16)

## 2019-05-13 PROCEDURE — 80053 COMPREHEN METABOLIC PANEL: CPT

## 2019-05-13 PROCEDURE — 83036 HEMOGLOBIN GLYCOSYLATED A1C: CPT

## 2019-05-13 PROCEDURE — 84550 ASSAY OF BLOOD/URIC ACID: CPT

## 2019-05-13 PROCEDURE — 36415 COLL VENOUS BLD VENIPUNCTURE: CPT

## 2019-05-13 PROCEDURE — 85025 COMPLETE CBC W/AUTO DIFF WBC: CPT

## 2019-05-17 ENCOUNTER — TRANSCRIBE ORDERS (OUTPATIENT)
Dept: ADMINISTRATIVE | Facility: HOSPITAL | Age: 74
End: 2019-05-17

## 2019-05-17 ENCOUNTER — APPOINTMENT (OUTPATIENT)
Dept: RADIOLOGY | Facility: MEDICAL CENTER | Age: 74
End: 2019-05-17
Payer: MEDICARE

## 2019-05-17 DIAGNOSIS — R52 TENDERNESS: ICD-10-CM

## 2019-05-17 DIAGNOSIS — R25.2 SPASM: ICD-10-CM

## 2019-05-17 DIAGNOSIS — M54.2 CERVICAL PAIN: Primary | ICD-10-CM

## 2019-05-17 DIAGNOSIS — M54.2 CERVICAL PAIN: ICD-10-CM

## 2019-05-17 PROCEDURE — 72050 X-RAY EXAM NECK SPINE 4/5VWS: CPT

## 2019-06-11 ENCOUNTER — TRANSCRIBE ORDERS (OUTPATIENT)
Dept: LAB | Facility: MEDICAL CENTER | Age: 74
End: 2019-06-11

## 2019-06-11 ENCOUNTER — APPOINTMENT (OUTPATIENT)
Dept: LAB | Facility: MEDICAL CENTER | Age: 74
End: 2019-06-11
Payer: MEDICARE

## 2019-06-11 DIAGNOSIS — E03.9 HYPOTHYROIDISM, UNSPECIFIED TYPE: ICD-10-CM

## 2019-06-11 DIAGNOSIS — E13.8 OTHER SPECIFIED DIABETES MELLITUS WITH COMPLICATION, WITH LONG-TERM CURRENT USE OF INSULIN: ICD-10-CM

## 2019-06-11 DIAGNOSIS — G43.C1 INTRACTABLE PERIODIC HEADACHE SYNDROME: ICD-10-CM

## 2019-06-11 DIAGNOSIS — Z79.4 OTHER SPECIFIED DIABETES MELLITUS WITH COMPLICATION, WITH LONG-TERM CURRENT USE OF INSULIN: ICD-10-CM

## 2019-06-11 DIAGNOSIS — G43.C1 INTRACTABLE PERIODIC HEADACHE SYNDROME: Primary | ICD-10-CM

## 2019-06-11 LAB
BUN SERPL-MCNC: 15 MG/DL (ref 5–25)
CREAT SERPL-MCNC: 0.89 MG/DL (ref 0.6–1.3)
CRP SERPL QL: <3 MG/L
ERYTHROCYTE [SEDIMENTATION RATE] IN BLOOD: 12 MM/HOUR (ref 0–20)
GFR SERPL CREATININE-BSD FRML MDRD: 65 ML/MIN/1.73SQ M
T4 FREE SERPL-MCNC: 0.94 NG/DL (ref 0.76–1.46)
TSH SERPL DL<=0.05 MIU/L-ACNC: 1.23 UIU/ML (ref 0.36–3.74)

## 2019-06-11 PROCEDURE — 85652 RBC SED RATE AUTOMATED: CPT

## 2019-06-11 PROCEDURE — 84520 ASSAY OF UREA NITROGEN: CPT

## 2019-06-11 PROCEDURE — 86618 LYME DISEASE ANTIBODY: CPT

## 2019-06-11 PROCEDURE — 84443 ASSAY THYROID STIM HORMONE: CPT

## 2019-06-11 PROCEDURE — 36415 COLL VENOUS BLD VENIPUNCTURE: CPT

## 2019-06-11 PROCEDURE — 84439 ASSAY OF FREE THYROXINE: CPT

## 2019-06-11 PROCEDURE — 82565 ASSAY OF CREATININE: CPT

## 2019-06-11 PROCEDURE — 86140 C-REACTIVE PROTEIN: CPT

## 2019-06-12 ENCOUNTER — TRANSCRIBE ORDERS (OUTPATIENT)
Dept: ADMINISTRATIVE | Facility: HOSPITAL | Age: 74
End: 2019-06-12

## 2019-06-12 DIAGNOSIS — I67.9 CVD (CEREBROVASCULAR DISEASE): Primary | ICD-10-CM

## 2019-06-12 DIAGNOSIS — G43.609 PERSISTENT MIGRAINE AURA WITH CEREBRAL INFARCTION AND WITHOUT STATUS MIGRAINOSUS, NOT INTRACTABLE (HCC): ICD-10-CM

## 2019-06-12 DIAGNOSIS — I63.9 PERSISTENT MIGRAINE AURA WITH CEREBRAL INFARCTION AND WITHOUT STATUS MIGRAINOSUS, NOT INTRACTABLE (HCC): ICD-10-CM

## 2019-06-13 LAB
B BURGDOR IGG SER IA-ACNC: 0.07
B BURGDOR IGM SER IA-ACNC: 0.21

## 2019-06-19 ENCOUNTER — HOSPITAL ENCOUNTER (OUTPATIENT)
Dept: MRI IMAGING | Facility: HOSPITAL | Age: 74
Discharge: HOME | End: 2019-06-19
Attending: FAMILY MEDICINE
Payer: MEDICARE

## 2019-06-19 ENCOUNTER — HOSPITAL ENCOUNTER (OUTPATIENT)
Dept: NON INVASIVE DIAGNOSTICS | Facility: HOSPITAL | Age: 74
Discharge: HOME | End: 2019-06-19
Attending: FAMILY MEDICINE
Payer: MEDICARE

## 2019-06-19 DIAGNOSIS — G43.609 PERSISTENT MIGRAINE AURA WITH CEREBRAL INFARCTION AND WITHOUT STATUS MIGRAINOSUS, NOT INTRACTABLE (HCC): ICD-10-CM

## 2019-06-19 DIAGNOSIS — I67.9 CVD (CEREBROVASCULAR DISEASE): ICD-10-CM

## 2019-06-19 DIAGNOSIS — I63.9 PERSISTENT MIGRAINE AURA WITH CEREBRAL INFARCTION AND WITHOUT STATUS MIGRAINOSUS, NOT INTRACTABLE (HCC): ICD-10-CM

## 2019-06-19 PROCEDURE — 93880 EXTRACRANIAL BILAT STUDY: CPT | Performed by: SURGERY

## 2019-06-19 PROCEDURE — A9585 GADOBUTROL INJECTION: HCPCS | Performed by: FAMILY MEDICINE

## 2019-06-19 PROCEDURE — 70553 MRI BRAIN STEM W/O & W/DYE: CPT

## 2019-06-19 PROCEDURE — 93880 EXTRACRANIAL BILAT STUDY: CPT

## 2019-06-19 RX ADMIN — GADOBUTROL 10 ML: 604.72 INJECTION INTRAVENOUS at 13:05

## 2019-07-11 ENCOUNTER — ANNUAL EXAM (OUTPATIENT)
Dept: GYNECOLOGY | Facility: CLINIC | Age: 74
End: 2019-07-11
Payer: MEDICARE

## 2019-07-11 VITALS
HEIGHT: 66 IN | SYSTOLIC BLOOD PRESSURE: 124 MMHG | WEIGHT: 219 LBS | DIASTOLIC BLOOD PRESSURE: 70 MMHG | BODY MASS INDEX: 35.2 KG/M2

## 2019-07-11 DIAGNOSIS — L90.0 LICHEN SCLEROSUS: ICD-10-CM

## 2019-07-11 DIAGNOSIS — Z12.31 ENCOUNTER FOR SCREENING MAMMOGRAM FOR MALIGNANT NEOPLASM OF BREAST: ICD-10-CM

## 2019-07-11 DIAGNOSIS — Z13.820 SCREENING FOR OSTEOPOROSIS: Primary | ICD-10-CM

## 2019-07-11 DIAGNOSIS — Z78.0 MENOPAUSE: ICD-10-CM

## 2019-07-11 DIAGNOSIS — Z01.419 ENCOUNTER FOR GYNECOLOGICAL EXAMINATION WITH PAPANICOLAOU SMEAR OF CERVIX: ICD-10-CM

## 2019-07-11 PROCEDURE — G0145 SCR C/V CYTO,THINLAYER,RESCR: HCPCS | Performed by: OBSTETRICS & GYNECOLOGY

## 2019-07-11 PROCEDURE — G0101 CA SCREEN;PELVIC/BREAST EXAM: HCPCS | Performed by: OBSTETRICS & GYNECOLOGY

## 2019-07-11 NOTE — PROGRESS NOTES
Assessment/Plan:         Diagnoses and all orders for this visit:    Screening for osteoporosis  -     DXA bone density spine hip and pelvis; Future    Menopause  -     DXA bone density spine hip and pelvis; Future    Encounter for screening mammogram for malignant neoplasm of breast  -     Mammo screening bilateral w 3d & cad; Future    Lichen sclerosus        Subjective:      Patient ID: Tobias Brennan is a 69 y o  female  HPI  Annual exam  No c/o  Uses clobetasol for occasional lichen flare  No GI or urinary complaints  Colonoscopy 2016 nml ( q 5 yrs)  Dexa 2016- nml    The following portions of the patient's history were reviewed and updated as appropriate:   She  has no past medical history on file  She   Patient Active Problem List    Diagnosis Date Noted    Nephrolithiasis 01/25/2019     She  has no past surgical history on file  Her family history is not on file  She  reports that she has never smoked  She has never used smokeless tobacco  She reports that she does not drink alcohol or use drugs  Current Outpatient Medications   Medication Sig Dispense Refill    allopurinol (ZYLOPRIM) 100 mg tablet       Aspirin 81 MG EC tablet Take by mouth      clobetasol (TEMOVATE) 0 05 % cream Apply topically 2 (two) times a day for 14 days 30 g 3    levothyroxine 75 mcg tablet       lisinopril-hydrochlorothiazide (PRINZIDE,ZESTORETIC) 10-12 5 MG per tablet       neomycin-polymyxin-hydrocortisone (CORTISPORIN) 0 35%-10,000 units/mL-1% otic suspension Administer into ears      Omega-3 Fatty Acids (FISH OIL) 645 MG CAPS Take by mouth      SUMAtriptan (IMITREX) 50 mg tablet Take by mouth       No current facility-administered medications for this visit        Current Outpatient Medications on File Prior to Visit   Medication Sig    allopurinol (ZYLOPRIM) 100 mg tablet     Aspirin 81 MG EC tablet Take by mouth    clobetasol (TEMOVATE) 0 05 % cream Apply topically 2 (two) times a day for 14 days    levothyroxine 75 mcg tablet     lisinopril-hydrochlorothiazide (PRINZIDE,ZESTORETIC) 10-12 5 MG per tablet     neomycin-polymyxin-hydrocortisone (CORTISPORIN) 0 35%-10,000 units/mL-1% otic suspension Administer into ears    Omega-3 Fatty Acids (FISH OIL) 645 MG CAPS Take by mouth    SUMAtriptan (IMITREX) 50 mg tablet Take by mouth     No current facility-administered medications on file prior to visit  She is allergic to gluten meal; diclofenac; and penicillins       Review of Systems   Constitutional: Negative  Gastrointestinal: Negative  Genitourinary: Negative  Objective:      /70   Ht 5' 6 2" (1 681 m)   Wt 99 3 kg (219 lb)   BMI 35 13 kg/m²          Physical Exam   Constitutional: She appears well-nourished  Neck: Normal range of motion  Neck supple  No thyromegaly present  Cardiovascular: Normal rate, regular rhythm and normal heart sounds  Pulmonary/Chest: Effort normal and breath sounds normal  No respiratory distress  Right breast exhibits no inverted nipple, no mass, no nipple discharge, no skin change and no tenderness  Left breast exhibits no inverted nipple, no mass, no nipple discharge, no skin change and no tenderness  Abdominal: Soft  Bowel sounds are normal  She exhibits no distension and no mass  There is no tenderness  There is no rebound and no guarding  No hernia  Hernia confirmed negative in the right inguinal area and confirmed negative in the left inguinal area  Genitourinary: There is no rash, tenderness or lesion on the right labia  There is no rash, tenderness or lesion on the left labia  Uterus is not deviated, not enlarged, not fixed and not tender  Cervix exhibits no motion tenderness, no discharge and no friability  Right adnexum displays no mass, no tenderness and no fullness  Left adnexum displays no mass, no tenderness and no fullness  No erythema, tenderness or bleeding in the vagina  No vaginal discharge found     Genitourinary Comments: Vulva: nml  'Bartholins and skenes glands nml  Urethral orifice nml  No cystocele/rectocele   Lymphadenopathy:     She has no cervical adenopathy  No inguinal adenopathy noted on the right or left side

## 2019-07-16 LAB
LAB AP GYN PRIMARY INTERPRETATION: NORMAL
Lab: NORMAL

## 2019-08-30 ENCOUNTER — TRANSCRIBE ORDERS (OUTPATIENT)
Dept: ADMINISTRATIVE | Facility: HOSPITAL | Age: 74
End: 2019-08-30

## 2019-08-30 ENCOUNTER — APPOINTMENT (OUTPATIENT)
Dept: LAB | Facility: MEDICAL CENTER | Age: 74
End: 2019-08-30
Payer: MEDICARE

## 2019-08-30 DIAGNOSIS — E13.8 DIABETES MELLITUS OF OTHER TYPE WITH COMPLICATION, UNSPECIFIED WHETHER LONG TERM INSULIN USE: Primary | ICD-10-CM

## 2019-08-30 DIAGNOSIS — M10.9 GOUT, UNSPECIFIED CAUSE, UNSPECIFIED CHRONICITY, UNSPECIFIED SITE: ICD-10-CM

## 2019-08-30 DIAGNOSIS — E13.8 DIABETES MELLITUS OF OTHER TYPE WITH COMPLICATION, UNSPECIFIED WHETHER LONG TERM INSULIN USE: ICD-10-CM

## 2019-08-30 DIAGNOSIS — E07.9 THYROID DYSFUNCTION: ICD-10-CM

## 2019-08-30 LAB
25(OH)D3 SERPL-MCNC: 35.6 NG/ML (ref 30–100)
CREAT UR-MCNC: 157 MG/DL
EST. AVERAGE GLUCOSE BLD GHB EST-MCNC: 117 MG/DL
HBA1C MFR BLD: 5.7 % (ref 4.2–6.3)
MICROALBUMIN UR-MCNC: 90.1 MG/L (ref 0–20)
MICROALBUMIN/CREAT 24H UR: 57 MG/G CREATININE (ref 0–30)
T4 FREE SERPL-MCNC: 0.96 NG/DL (ref 0.76–1.46)
TSH SERPL DL<=0.05 MIU/L-ACNC: 1.24 UIU/ML (ref 0.36–3.74)
URATE SERPL-MCNC: 5.7 MG/DL (ref 2–6.8)

## 2019-08-30 PROCEDURE — 84550 ASSAY OF BLOOD/URIC ACID: CPT

## 2019-08-30 PROCEDURE — 82570 ASSAY OF URINE CREATININE: CPT | Performed by: FAMILY MEDICINE

## 2019-08-30 PROCEDURE — 36415 COLL VENOUS BLD VENIPUNCTURE: CPT

## 2019-08-30 PROCEDURE — 83036 HEMOGLOBIN GLYCOSYLATED A1C: CPT

## 2019-08-30 PROCEDURE — 82306 VITAMIN D 25 HYDROXY: CPT

## 2019-08-30 PROCEDURE — 84443 ASSAY THYROID STIM HORMONE: CPT

## 2019-08-30 PROCEDURE — 84439 ASSAY OF FREE THYROXINE: CPT

## 2019-08-30 PROCEDURE — 82043 UR ALBUMIN QUANTITATIVE: CPT | Performed by: FAMILY MEDICINE

## 2019-10-01 ENCOUNTER — TRANSCRIBE ORDERS (OUTPATIENT)
Dept: LAB | Facility: MEDICAL CENTER | Age: 74
End: 2019-10-01

## 2019-10-01 ENCOUNTER — APPOINTMENT (OUTPATIENT)
Dept: LAB | Facility: MEDICAL CENTER | Age: 74
End: 2019-10-01
Payer: MEDICARE

## 2019-10-01 DIAGNOSIS — K90.0 CELIAC DISEASE: ICD-10-CM

## 2019-10-01 DIAGNOSIS — K90.0 CELIAC DISEASE: Primary | ICD-10-CM

## 2019-10-01 PROCEDURE — 36415 COLL VENOUS BLD VENIPUNCTURE: CPT

## 2019-10-01 PROCEDURE — 82784 ASSAY IGA/IGD/IGG/IGM EACH: CPT

## 2019-10-01 PROCEDURE — 86255 FLUORESCENT ANTIBODY SCREEN: CPT

## 2019-10-01 PROCEDURE — 83516 IMMUNOASSAY NONANTIBODY: CPT

## 2019-10-03 ENCOUNTER — HOSPITAL ENCOUNTER (OUTPATIENT)
Dept: BONE DENSITY | Facility: MEDICAL CENTER | Age: 74
Discharge: HOME | End: 2019-10-03
Payer: MEDICARE

## 2019-10-03 DIAGNOSIS — Z78.0 MENOPAUSE: ICD-10-CM

## 2019-10-03 DIAGNOSIS — Z13.820 SCREENING FOR OSTEOPOROSIS: ICD-10-CM

## 2019-10-03 LAB
ENDOMYSIUM IGA SER QL: NEGATIVE
GLIADIN PEPTIDE IGA SER-ACNC: 13 UNITS (ref 0–19)
GLIADIN PEPTIDE IGG SER-ACNC: 3 UNITS (ref 0–19)
IGA SERPL-MCNC: 218 MG/DL (ref 64–422)
TTG IGA SER-ACNC: 2 U/ML (ref 0–3)
TTG IGG SER-ACNC: <2 U/ML (ref 0–5)

## 2019-10-03 PROCEDURE — 77080 DXA BONE DENSITY AXIAL: CPT

## 2019-10-09 ENCOUNTER — HOSPITAL ENCOUNTER (OUTPATIENT)
Dept: MAMMOGRAPHY | Facility: HOSPITAL | Age: 74
Discharge: HOME | End: 2019-10-09
Payer: MEDICARE

## 2019-10-09 VITALS — HEIGHT: 66 IN | BODY MASS INDEX: 35.2 KG/M2 | WEIGHT: 219 LBS

## 2019-10-09 DIAGNOSIS — Z12.31 ENCOUNTER FOR SCREENING MAMMOGRAM FOR MALIGNANT NEOPLASM OF BREAST: ICD-10-CM

## 2019-10-09 PROCEDURE — 77063 BREAST TOMOSYNTHESIS BI: CPT

## 2019-10-09 PROCEDURE — 77067 SCR MAMMO BI INCL CAD: CPT

## 2019-10-29 ENCOUNTER — TRANSCRIBE ORDERS (OUTPATIENT)
Dept: ADMINISTRATIVE | Facility: HOSPITAL | Age: 74
End: 2019-10-29

## 2019-10-29 ENCOUNTER — APPOINTMENT (OUTPATIENT)
Dept: LAB | Facility: MEDICAL CENTER | Age: 74
End: 2019-10-29
Payer: MEDICARE

## 2019-10-29 DIAGNOSIS — M1A.9XX0 CHRONIC GOUT WITHOUT TOPHUS, UNSPECIFIED CAUSE, UNSPECIFIED SITE: ICD-10-CM

## 2019-10-29 DIAGNOSIS — M1A.9XX0 CHRONIC GOUT WITHOUT TOPHUS, UNSPECIFIED CAUSE, UNSPECIFIED SITE: Primary | ICD-10-CM

## 2019-10-29 LAB
ALBUMIN SERPL BCP-MCNC: 4.4 G/DL (ref 3.5–5)
ALP SERPL-CCNC: 57 U/L (ref 46–116)
ALT SERPL W P-5'-P-CCNC: 38 U/L (ref 12–78)
ANION GAP SERPL CALCULATED.3IONS-SCNC: 7 MMOL/L (ref 4–13)
AST SERPL W P-5'-P-CCNC: 21 U/L (ref 5–45)
BASOPHILS # BLD AUTO: 0.05 THOUSANDS/ΜL (ref 0–0.1)
BASOPHILS NFR BLD AUTO: 1 % (ref 0–1)
BILIRUB SERPL-MCNC: 0.51 MG/DL (ref 0.2–1)
BUN SERPL-MCNC: 12 MG/DL (ref 5–25)
CALCIUM SERPL-MCNC: 9.5 MG/DL (ref 8.3–10.1)
CHLORIDE SERPL-SCNC: 106 MMOL/L (ref 100–108)
CO2 SERPL-SCNC: 29 MMOL/L (ref 21–32)
CREAT SERPL-MCNC: 0.85 MG/DL (ref 0.6–1.3)
EOSINOPHIL # BLD AUTO: 0.22 THOUSAND/ΜL (ref 0–0.61)
EOSINOPHIL NFR BLD AUTO: 3 % (ref 0–6)
ERYTHROCYTE [DISTWIDTH] IN BLOOD BY AUTOMATED COUNT: 13.2 % (ref 11.6–15.1)
GFR SERPL CREATININE-BSD FRML MDRD: 69 ML/MIN/1.73SQ M
GLUCOSE P FAST SERPL-MCNC: 129 MG/DL (ref 65–99)
HCT VFR BLD AUTO: 44.8 % (ref 34.8–46.1)
HGB BLD-MCNC: 14.9 G/DL (ref 11.5–15.4)
IMM GRANULOCYTES # BLD AUTO: 0.03 THOUSAND/UL (ref 0–0.2)
IMM GRANULOCYTES NFR BLD AUTO: 0 % (ref 0–2)
LYMPHOCYTES # BLD AUTO: 2.03 THOUSANDS/ΜL (ref 0.6–4.47)
LYMPHOCYTES NFR BLD AUTO: 28 % (ref 14–44)
MCH RBC QN AUTO: 32.3 PG (ref 26.8–34.3)
MCHC RBC AUTO-ENTMCNC: 33.3 G/DL (ref 31.4–37.4)
MCV RBC AUTO: 97 FL (ref 82–98)
MONOCYTES # BLD AUTO: 0.45 THOUSAND/ΜL (ref 0.17–1.22)
MONOCYTES NFR BLD AUTO: 6 % (ref 4–12)
NEUTROPHILS # BLD AUTO: 4.61 THOUSANDS/ΜL (ref 1.85–7.62)
NEUTS SEG NFR BLD AUTO: 62 % (ref 43–75)
NRBC BLD AUTO-RTO: 0 /100 WBCS
PLATELET # BLD AUTO: 225 THOUSANDS/UL (ref 149–390)
PMV BLD AUTO: 11.2 FL (ref 8.9–12.7)
POTASSIUM SERPL-SCNC: 3.8 MMOL/L (ref 3.5–5.3)
PROT SERPL-MCNC: 7.4 G/DL (ref 6.4–8.2)
RBC # BLD AUTO: 4.62 MILLION/UL (ref 3.81–5.12)
SODIUM SERPL-SCNC: 142 MMOL/L (ref 136–145)
URATE SERPL-MCNC: 5.8 MG/DL (ref 2–6.8)
WBC # BLD AUTO: 7.39 THOUSAND/UL (ref 4.31–10.16)

## 2019-10-29 PROCEDURE — 84550 ASSAY OF BLOOD/URIC ACID: CPT

## 2019-10-29 PROCEDURE — 80053 COMPREHEN METABOLIC PANEL: CPT

## 2019-10-29 PROCEDURE — 85025 COMPLETE CBC W/AUTO DIFF WBC: CPT

## 2019-10-29 PROCEDURE — 36415 COLL VENOUS BLD VENIPUNCTURE: CPT

## 2019-12-02 ENCOUNTER — CONSULT (OUTPATIENT)
Dept: VASCULAR SURGERY | Facility: CLINIC | Age: 74
End: 2019-12-02
Payer: MEDICARE

## 2019-12-02 VITALS
WEIGHT: 218 LBS | HEART RATE: 80 BPM | BODY MASS INDEX: 35.03 KG/M2 | HEIGHT: 66 IN | SYSTOLIC BLOOD PRESSURE: 132 MMHG | DIASTOLIC BLOOD PRESSURE: 76 MMHG

## 2019-12-02 DIAGNOSIS — I80.02 THROMBOPHLEBITIS OF SUPERFICIAL VEINS OF LEFT LOWER EXTREMITY: ICD-10-CM

## 2019-12-02 DIAGNOSIS — I83.892 SYMPTOMATIC VARICOSE VEINS OF LEFT LOWER EXTREMITY: Primary | ICD-10-CM

## 2019-12-02 DIAGNOSIS — I83.893 VARICOSE VEINS OF BOTH LOWER EXTREMITIES WITH COMPLICATIONS: ICD-10-CM

## 2019-12-02 PROBLEM — G47.33 OSA ON CPAP: Status: ACTIVE | Noted: 2019-08-08

## 2019-12-02 PROBLEM — Z99.89 OSA ON CPAP: Status: ACTIVE | Noted: 2019-08-08

## 2019-12-02 PROCEDURE — 99203 OFFICE O/P NEW LOW 30 MIN: CPT | Performed by: PHYSICIAN ASSISTANT

## 2019-12-02 RX ORDER — AZITHROMYCIN 250 MG/1
TABLET, FILM COATED ORAL
Refills: 0 | COMMUNITY
Start: 2019-10-17 | End: 2020-03-09 | Stop reason: ALTCHOICE

## 2019-12-02 RX ORDER — FLUTICASONE PROPIONATE 50 MCG
SPRAY, SUSPENSION (ML) NASAL
Refills: 2 | COMMUNITY
Start: 2019-10-17

## 2019-12-02 RX ORDER — CITALOPRAM 10 MG/1
10 TABLET ORAL
Refills: 3 | COMMUNITY
Start: 2019-11-26

## 2019-12-02 NOTE — ASSESSMENT & PLAN NOTE
44-year-old female with history of nephrolithiasis, DEEPAK and BLE varicose veins, L>R, s/p LLE venous intervention @ [facility] '05 who presents with acute superficial phlebitis of L thigh varicosity  Tenderness and erythema isolated to thrombosed varicosity mid thigh   +chronic L lower leg swelling with chronic discrepancy in L calf circumference  -local conservative therapy to include warm compresses, compression, elevation and NSAIDs  -will obtain LEVD to r/o proximal GSV propagation, particularly given upcoming flight to [place] in 1 week  Will contact patient with any new abnormal findings in addition to localized phlebitis    -continue ASA and discussed DVT prevention    -recommend 3 month trial of conservative measures to include daily use of compression stockings, lower extremity elevation, low-sodium diet, aerobic activity, weight management and skin moisturization  -LEVDR in 3 months to assess for venous incompetence given episode of unprovoked SVT  -return to office with surgeon in 3 months with LEVDR for re-evaluation and discussion of surgical options  -instructed to contact the office in the interim with any questions, concerns or new symptoms

## 2019-12-02 NOTE — PATIENT INSTRUCTIONS
Superficial Thrombophlebitis   WHAT YOU NEED TO KNOW:   What is superficial thrombophlebitis (STP)? STP is inflammation of a vein just under your skin (superficial vein)  The inflammation causes a blood clot to form in your vein  STP most often happens in your leg but may also happen in your arm  What increases my risk for STP? · A condition that affects your blood vessels, such as varicose veins    · A long-term IV catheter    · Recent surgery    · Multiple IV injections or IV drug abuse    · Obesity, pregnancy, or cancer    · Limited activity caused by bed rest, a leg cast, or sitting for long periods    · A blood disorder that makes your blood clot faster than normal, such as factor V Leiden mutation    · Use of hormone replacement therapy or some types of birth control medicine in women  What are the signs and symptoms of STP? You may see a red line on your skin that covers the vein  You may also have swelling and pain near the vein  You may have a fever if infection has spread from your vein to others places in your body  How is STP diagnosed? Your healthcare provider will examine you  You may need any of the following:  · Blood tests  may be done to check for infection and test how fast your blood clots  · Doppler ultrasound  uses sound waves to check for blood clots or damage to your vein  How is STP treated? · Medicines  may be given to treat an infection and decrease swelling and pain  Medicine may also be given to prevent more blood clots  · Removal of an IV catheter  may be needed if your IV is infected  · Surgery  may be needed to remove the blood clot or part of your vein  Surgery may also be needed to remove a collection of infected fluid from your vein  How can I manage my symptoms and prevent STP? STP can increase your risk for a blood clot in deeper veins in your arms or legs  It can also increase your risk for a blood clot in your lungs   Do the following to decrease your risk for more blood clots and manage your symptoms:  · Wear pressure stockings as directed  Pressure stockings improve blood flow and help prevent clots in your legs  Wear the stockings during the day  Do not wear them when you sleep  · Elevate your leg or arm above the level of your heart as often as you can  This will help decrease swelling and pain  Prop your leg or arm on pillows or blankets to keep it elevated comfortably  · Apply a warm compress to your arm or leg  This will help decrease swelling and pain  Wet a washcloth in warm water  Do not  use hot water  Apply the warm compress for 10 minutes  Repeat this 4 times each day  · Maintain a healthy weight  This will help decrease your risk for another blood clot  Ask your healthcare provider how much you should weigh  Ask him or her to help you create a weight loss plan if you are overweight  · Do not smoke  Nicotine and other chemicals in cigarettes and cigars can damage blood vessels and increase your risk for blood clots  Ask your healthcare provider for information if you currently smoke and need help to quit  E-cigarettes or smokeless tobacco still contain nicotine  Talk to your healthcare provider before you use these products  · Stay active  Activity helps prevent blood clots  Do not sit for more than an hour  If you travel by car or work at a desk, move and stretch in your seat several times each hour  In an airplane, get up and walk every hour  Exercise your legs while you are sitting by raising and lowering your heels  Keep your toes on the floor while you do this  You can also raise and lower your toes while keeping your heels on the floor  Also tighten and release your leg muscles while you are sitting  · Exercise regularly  Exercise can help increase your blood flow and prevent a blood clot  Walking is a good low-impact exercise  Talk to your healthcare provider about the best exercise plan for you       · Do not inject illegal drugs  Talk to your healthcare provider if you use IV drugs and need help to quit  Call 911 for any of the following:   · You feel lightheaded, short of breath, and have chest pain  · You cough up blood  When should I seek immediate care? · Your leg or arm turns pale or blue  · Your leg or arm feels hot or cold  · Your arm or leg feels warm, tender, and painful  It may look swollen and red  When should I contact my healthcare provider? · Your symptoms return after treatment  · You have questions or concerns about your condition or care  CARE AGREEMENT:   You have the right to help plan your care  Learn about your health condition and how it may be treated  Discuss treatment options with your caregivers to decide what care you want to receive  You always have the right to refuse treatment  The above information is an  only  It is not intended as medical advice for individual conditions or treatments  Talk to your doctor, nurse or pharmacist before following any medical regimen to see if it is safe and effective for you  © 2017 [facility] Information is for End User's use only and may not be sold, redistributed or otherwise used for commercial purposes  All illustrations and images included in CareNotes® are the copyrighted property of [employer]  or [employer]  Varicose Veins   WHAT YOU NEED TO KNOW:   What are varicose veins? Varicose veins are veins that become large, twisted, and swollen  They are common on the back of the calves, knees, and thighs  Varicose veins are caused by valves in your veins that do not work properly  This causes blood to collect and increase pressure in the veins of your legs  The increased pressure causes your veins to stretch, get larger, swell, and twist        What increases my risk for varicose veins?    · Pregnancy    · A family history of varicose veins    · Being overweight or obese    · Age 48 years or older    · Sitting or standing for long periods of time    · Wearing tight clothing  What are the signs and symptoms of varicose veins? Your symptoms may be worse after you stand or sit for long periods of time  You may have any of the following:  · Blue, purple, or bulging veins in your legs     · Pain, swelling, or muscle cramps in your legs    · Feeling of fatigue or heaviness in your legs  How are varicose veins diagnosed? Your healthcare provider will examine your legs and ask about your medical history  You may need tests, such as a Doppler ultrasound or duplex scan  These tests show your veins and valves, and how your blood is flowing through them  These tests may also show if there is a blockage or blood clot  How are varicose veins treated? The goal of treatment is to decrease symptoms, improve appearance, and prevent further problems  Treatment will depend on which veins are affected and how severe your condition is  You may need procedures to treat or remove your varicose veins  For example, your healthcare provider may inject a solution or use a laser to close the varicose veins  Surgery to remove long veins may also be done  Ask your healthcare provider for more information about procedures used to treat varicose veins  What can I do to manage my symptoms? · Do not sit or stand for long periods of time  This can cause the blood to collect in your legs and make your symptoms worse  Bend or rotate your ankles several times every hour  Walk around for a few minutes every hour to get blood moving in your legs  · Do not cross your legs when you sit  This decreases blood flow to your feet and can make your symptoms worse  · Do not wear tight clothing or shoes  Do not wear high-heeled shoes  Do not wear clothes that are tight around the waist or knees  · Maintain a healthy weight  Being overweight or obese can make your varicose veins worse   Ask your healthcare provider how much you should weigh  Ask him or her to help you create a weight loss plan if you are overweight  · Wear pressure stockings as directed  The stockings are tight and put pressure on your legs  They improve blood flow and help prevent clots  · Elevate your legs  Keep them above the level of your heart for 15 to 30 minutes several times a day  You can also prop the end of your bed up slightly to elevate your legs while you sleep  This will help blood to flow back to your heart  · Get regular exercise  Talk to your healthcare provider about the best exercise plan for you  Exercise can improve blood flow to your legs and feet  When should I seek immediate care? · You have a wound that does not heal or is infected  · You have an injury that has broken your skin and caused your varicose veins to bleed  · Your leg is swollen and hard  · You notice that your legs or feet are turning blue or black  · Your leg feels warm, tender, and painful  It may look swollen and red  When should I contact my healthcare provider? · You have pain in your leg that does not go away or gets worse  · You notice sudden large bruising on your legs  · You have a rash on your leg  · Your symptoms keep you from doing your daily activities  · You have questions or concerns about your condition or care  CARE AGREEMENT:   You have the right to help plan your care  Learn about your health condition and how it may be treated  Discuss treatment options with your caregivers to decide what care you want to receive  You always have the right to refuse treatment  The above information is an  only  It is not intended as medical advice for individual conditions or treatments  Talk to your doctor, nurse or pharmacist before following any medical regimen to see if it is safe and effective for you    © 2017 [employer] Information is for End User's use only and may not be sold, redistributed or otherwise used for commercial purposes  All illustrations and images included in CareNotes® are the copyrighted property of [employer]  or [employer]  -your symptoms of the left thigh are related to a superficial phlebitis of your varicose vein  Treatment is conservative and includes warm compresses, elevation, NSAIDs (Motrin, ibuprofen, Aleve, etc)  We will obtain a venous ultrasound to rule out any extensive involvement of your superficial vein, particularly considering your upcoming flight  The office will contact you with any further abnormal findings that require treatment   -you have been provided with a prescription for compression stockings      -wear your compression, stay hydrated and get up from your seat frequently during your upcoming flight   -continue aspirin daily  -recommend 3 month trial of conservative measures to include daily use of prescription compression stockings, leg elevation, low-salt diet, aerobic activity, weight management and lotion to leg to help promote good skin health  -place your compression stockings on in the morning and remove prior to bed  -we will schedule you for a lower extremity venous reflux study to assess the function of your valves in your veins to help guide treatment options  -return to office with surgeon in 3 months after reflux study for re-evaluation   -please contact the office in the interim with any questions, concerns or new symptoms

## 2019-12-02 NOTE — ASSESSMENT & PLAN NOTE
-longstanding BLE varicose veins, L>R  -s/p LLE venous intervention at [facility] '05 for symptoms of chronic L calf swelling  -see plan as outlined above

## 2019-12-02 NOTE — PROGRESS NOTES
Assessment/Plan: 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  68-year-old female with history of nephrolithiasis, DEEPAK and BLE varicose veins, L>R, s/p LLE venous intervention @ [facility] '05 who presents with acute superficial phlebitis of L thigh varicosity  Tenderness and erythema isolated to thrombosed varicosity mid thigh   +chronic L lower leg swelling with chronic discrepancy in L calf circumference  -local conservative therapy to include warm compresses, compression, elevation and NSAIDs  -will obtain LEVD to r/o proximal GSV propagation, particularly given upcoming flight to [place] in 1 week  Will contact patient with any new abnormal findings in addition to localized phlebitis  -continue ASA and discussed DVT prevention    -recommend 3 month trial of conservative measures to include daily use of compression stockings, lower extremity elevation, low-sodium diet, aerobic activity, weight management and skin moisturization  -LEVDR in 3 months to assess for venous incompetence given episode of unprovoked SVT  -return to office with surgeon in 3 months with LEVDR for re-evaluation and discussion of surgical options  -instructed to contact the office in the interim with any questions, concerns or new symptoms    Varicose veins of both lower extremities with complications  -longstanding BLE varicose veins, L>R  -s/p LLE venous intervention at [facility] '05 for symptoms of chronic L calf swelling  -see plan as outlined above  Diagnoses and all orders for this visit:    Symptomatic varicose veins of left lower extremity  -     Compression Stocking  -     VAS lower limb venous duplex study, unilateral/limited; Future  -     VAS reflux lower limb venous duplex study with reflux assesment, unilateral; Future    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  -     Compression Stocking  -     VAS lower limb venous duplex study, unilateral/limited;  Future    Varicose veins of both lower extremities with complications    Other orders  -     azithromycin (ZITHROMAX) 250 mg tablet  -     citalopram (CeleXA) 10 mg tablet; Take 10 mg by mouth  -     fluticasone (FLONASE) 50 mcg/act nasal spray          Subjective:      Patient ID: Barbra Moya is a 69 y o  female  Pt is a new [facility] patient, pt saw Dr Asiya Tucker in [place] x10 years ago   Pt C/o of  Swollen bulging vein in the L upper thigh  Pt states of redness and warm spot in the Upper L thigh  Pt states the spot is tender to touch and hurts when she gets up to move  Pt this has been going on for x1 week  Pt does not wear compression stockings currently  Pt bought "doctor socks" which she states has a light compression  Pt states she wears them 1-2 days a week for less than 8 hours a day  68-year-old female with history of nephrolithiasis, DEEPAK and BLE varicose veins, L>R, s/p LLE venous intervention @ [facility]SH '05 who presents on self-referral with acute superficial phlebitis of L thigh varicosity  Patient reports 1 week history of tenderness, bulging and erythema isolated to thrombosed varicosity mid thigh  Symptoms are improving over last 24-48 hours  +chronic L lower leg swelling with chronic discrepancy in L calf circumference which patient states precipitated prior venous intervention in 2005 @ [facility]  Patient denies prior history DVT, PE, hypercoagulable disorder, venous ulcerations, bleeding varicosities, recurrent lower extremity cellulitis or stasis dermatitis  Patient denies family history varicose veins, VTE or hypercoagulable disorder  Patient does not wear compression  The following portions of the patient's history were reviewed and updated as appropriate: allergies, current medications, past family history, past medical history, past social history, past surgical history and problem list     Review of Systems   Constitutional: Negative  HENT: Negative  Eyes: Negative  Respiratory: Negative      Cardiovascular: Positive for leg swelling (Swelling upper L thigh )  Gastrointestinal: Negative  Endocrine: Negative  Genitourinary: Negative  Musculoskeletal: Negative  Skin: Positive for color change (redness, warm spot L upper thigh )  Allergic/Immunologic: Negative  Neurological:        Tender to touch pain in L upper thigh    Hematological: Negative  Psychiatric/Behavioral: Negative  I have reviewed and made appropriate changes to the review of systems input by the medical assistant  Vitals:    12/02/19 1320   BP: 132/76   BP Location: Left arm   Patient Position: Sitting   Pulse: 80   Weight: 98 9 kg (218 lb)   Height: 5' 6" (1 676 m)       Patient Active Problem List   Diagnosis    Nephrolithiasis    DEEPAK on CPAP    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    Varicose veins of both lower extremities with complications       History reviewed  No pertinent surgical history      Family History   Problem Relation Age of Onset    No Known Problems Mother     No Known Problems Father     No Known Problems Sister     No Known Problems Daughter     No Known Problems Maternal Grandmother     No Known Problems Maternal Grandfather     No Known Problems Paternal Grandmother     No Known Problems Paternal Grandfather     No Known Problems Daughter     No Known Problems Maternal Aunt     No Known Problems Paternal Aunt     No Known Problems Paternal Aunt     No Known Problems Paternal Aunt     No Known Problems Paternal Aunt        Social History     Socioeconomic History    Marital status: /Civil Union     Spouse name: Not on file    Number of children: Not on file    Years of education: Not on file    Highest education level: Not on file   Occupational History    Not on file   Social Needs    Financial resource strain: Not on file    Food insecurity:     Worry: Not on file     Inability: Not on file    Transportation needs:     Medical: Not on file     Non-medical: Not on file   Tobacco Use    Smoking status: Never Smoker    Smokeless tobacco: Never Used   Substance and Sexual Activity    Alcohol use: No    Drug use: No    Sexual activity: Not on file   Lifestyle    Physical activity:     Days per week: Not on file     Minutes per session: Not on file    Stress: Not on file   Relationships    Social connections:     Talks on phone: Not on file     Gets together: Not on file     Attends [employer] service: Not on file     Active member of club or organization: Not on file     Attends meetings of clubs or organizations: Not on file     Relationship status: Not on file    Intimate partner violence:     Fear of current or ex partner: Not on file     Emotionally abused: Not on file     Physically abused: Not on file     Forced sexual activity: Not on file   Other Topics Concern    Not on file   Social History Narrative    Not on file       Allergies   Allergen Reactions    Gluten Meal     Diclofenac Rash    Penicillins Rash         Current Outpatient Medications:     allopurinol (ZYLOPRIM) 100 mg tablet, , Disp: , Rfl:     azithromycin (ZITHROMAX) 250 mg tablet, , Disp: , Rfl: 0    citalopram (CeleXA) 10 mg tablet, Take 10 mg by mouth, Disp: , Rfl: 3    fluticasone (FLONASE) 50 mcg/act nasal spray, , Disp: , Rfl: 2    levothyroxine 75 mcg tablet, , Disp: , Rfl:     lisinopril-hydrochlorothiazide (PRINZIDE,ZESTORETIC) 10-12 5 MG per tablet, , Disp: , Rfl:     neomycin-polymyxin-hydrocortisone (CORTISPORIN) 0 35%-10,000 units/mL-1% otic suspension, Administer into ears, Disp: , Rfl:     SUMAtriptan (IMITREX) 50 mg tablet, Take by mouth, Disp: , Rfl:     Aspirin 81 MG EC tablet, Take by mouth, Disp: , Rfl:     clobetasol (TEMOVATE) 0 05 % cream, Apply topically 2 (two) times a day for 14 days, Disp: 30 g, Rfl: 3    Omega-3 Fatty Acids (FISH OIL) 645 MG CAPS, Take by mouth, Disp: , Rfl:     Objective:      /76 (BP Location: Left arm, Patient Position: Sitting)   Pulse 80   Ht 5' 6" (1 676 m)   Wt 98 9 kg (218 lb)   BMI 35 19 kg/m²          Physical Exam   Constitutional: She is oriented to person, place, and time  She appears well-developed and well-nourished  No distress  HENT:   Head: Normocephalic and atraumatic  Eyes: Pupils are equal, round, and reactive to light  Conjunctivae and EOM are normal  No scleral icterus  Neck: Normal range of motion  Neck supple  No JVD present  Carotid bruit is not present  No tracheal deviation present  No thyromegaly present  Cardiovascular: Normal rate, regular rhythm, S1 normal and S2 normal  Exam reveals no gallop, no S3 and no friction rub  No murmur heard  Pulses:       Dorsalis pedis pulses are 2+ on the right side, and 2+ on the left side  Truncal varicosities noted left medial mid and distal thigh and left medial calf  Chronic increase left calf circumference  No venous ulcerations or hemosiderin staining  Well-circumscribed area of mild erythema and moderate tenderness with palpation overlying truncal varicosity L mid thigh  No warmth or skin breakdown  +palpable isolated thrombosed varicosity in area of erythema  No proximal thigh tenderness  Pulmonary/Chest: Breath sounds normal  No stridor  No respiratory distress  She has no wheezes  She has no rhonchi  She has no rales  Abdominal: Soft  Bowel sounds are normal  She exhibits no distension, no abdominal bruit, no pulsatile midline mass and no mass  There is no hepatosplenomegaly  There is no tenderness  There is no rebound  Musculoskeletal: Normal range of motion  She exhibits edema  She exhibits no deformity  Neurological: She is alert and oriented to person, place, and time  She has normal strength  Skin: Skin is warm, dry and intact  No lesion noted  There is erythema  No cyanosis  No pallor  Psychiatric: She has a normal mood and affect  Nursing note and vitals reviewed

## 2019-12-03 ENCOUNTER — RX ONLY (RX ONLY)
Age: 74
End: 2019-12-03

## 2019-12-03 ENCOUNTER — TELEPHONE (OUTPATIENT)
Dept: VASCULAR SURGERY | Facility: CLINIC | Age: 74
End: 2019-12-03

## 2019-12-03 ENCOUNTER — HOSPITAL ENCOUNTER (OUTPATIENT)
Dept: NON INVASIVE DIAGNOSTICS | Facility: HOSPITAL | Age: 74
Discharge: HOME | End: 2019-12-03
Payer: MEDICARE

## 2019-12-03 ENCOUNTER — DOCTOR'S OFFICE (OUTPATIENT)
Dept: URBAN - NONMETROPOLITAN AREA CLINIC 1 | Facility: CLINIC | Age: 74
Setting detail: OPHTHALMOLOGY
End: 2019-12-03
Payer: COMMERCIAL

## 2019-12-03 DIAGNOSIS — I83.892 SYMPTOMATIC VARICOSE VEINS OF LEFT LOWER EXTREMITY: ICD-10-CM

## 2019-12-03 DIAGNOSIS — H02.834: ICD-10-CM

## 2019-12-03 DIAGNOSIS — H04.123: ICD-10-CM

## 2019-12-03 DIAGNOSIS — I80.02 THROMBOPHLEBITIS OF SUPERFICIAL VEINS OF LEFT LOWER EXTREMITY: ICD-10-CM

## 2019-12-03 DIAGNOSIS — H02.831: ICD-10-CM

## 2019-12-03 DIAGNOSIS — H25.013: ICD-10-CM

## 2019-12-03 PROCEDURE — 93971 EXTREMITY STUDY: CPT | Performed by: SURGERY

## 2019-12-03 PROCEDURE — 99204 OFFICE O/P NEW MOD 45 MIN: CPT | Performed by: OPHTHALMOLOGY

## 2019-12-03 PROCEDURE — 93971 EXTREMITY STUDY: CPT

## 2019-12-03 ASSESSMENT — SPHEQUIV_DERIVED
OS_SPHEQUIV: -1
OD_SPHEQUIV: 0.125

## 2019-12-03 ASSESSMENT — REFRACTION_CURRENTRX
OS_OVR_VA: 20/
OD_AXIS: 5
OD_CYLINDER: -1.75
OS_SPHERE: +0.50
OS_ADD: +1.00
OS_AXIS: 153
OD_OVR_VA: 20/
OS_OVR_VA: 20/
OD_OVR_VA: 20/
OD_ADD: +1.00
OS_CYLINDER: -2.25
OD_VPRISM_DIRECTION: PROGS
OD_SPHERE: +0.50
OS_OVR_VA: 20/
OD_OVR_VA: 20/
OS_VPRISM_DIRECTION: PROGS

## 2019-12-03 ASSESSMENT — LID POSITION - DERMATOCHALASIS
OD_DERMATOCHALASIS: RUL 1+
OS_DERMATOCHALASIS: LUL 2+

## 2019-12-03 ASSESSMENT — REFRACTION_MANIFEST
OS_VA2: 20/
OD_VA1: 20/
OD_VA2: 20/
OD_VA1: 20/
OD_VA2: 20/
OS_VA3: 20/
OS_VA1: 20/
OS_VA1: 20/
OS_VA3: 20/
OS_VA2: 20/
OD_VA3: 20/
OU_VA: 20/
OD_VA3: 20/
OU_VA: 20/

## 2019-12-03 ASSESSMENT — CONFRONTATIONAL VISUAL FIELD TEST (CVF)
OS_FINDINGS: FULL
OD_FINDINGS: FULL

## 2019-12-03 ASSESSMENT — REFRACTION_AUTOREFRACTION
OD_CYLINDER: -1.75
OS_SPHERE: -0.75
OS_AXIS: 127
OD_AXIS: 4
OD_SPHERE: +1.00
OS_CYLINDER: -0.50

## 2019-12-03 ASSESSMENT — VISUAL ACUITY
OS_BCVA: 20/30-1
OD_BCVA: 20/40-1

## 2019-12-03 NOTE — TELEPHONE ENCOUNTER
Emerson mcknight from [facility] called and said patients doppler was negative for DVT , had superficial thrombus distal GSV on left and SVT ant accessory   I forwarded this report to [facility] JESSICA  No further instructions , per Anabell they discussed all instructions yesterday at visit     `

## 2019-12-18 ENCOUNTER — DOCTOR'S OFFICE (OUTPATIENT)
Dept: URBAN - NONMETROPOLITAN AREA CLINIC 1 | Facility: CLINIC | Age: 74
Setting detail: OPHTHALMOLOGY
End: 2019-12-18
Payer: COMMERCIAL

## 2019-12-18 DIAGNOSIS — H25.012: ICD-10-CM

## 2019-12-18 PROCEDURE — 92136 OPHTHALMIC BIOMETRY: CPT | Performed by: OPHTHALMOLOGY

## 2019-12-20 ENCOUNTER — AMBUL SURGICAL CARE (OUTPATIENT)
Dept: URBAN - METROPOLITAN AREA SURGERY 32 | Facility: SURGERY | Age: 74
Setting detail: OPHTHALMOLOGY
End: 2019-12-20

## 2019-12-20 DIAGNOSIS — H52.7: ICD-10-CM

## 2019-12-20 PROCEDURE — NO CHARGE N/C PROFESSIONAL COURTESY: Performed by: OPHTHALMOLOGY

## 2020-01-08 ENCOUNTER — APPOINTMENT (OUTPATIENT)
Dept: LAB | Facility: MEDICAL CENTER | Age: 75
End: 2020-01-08
Payer: MEDICARE

## 2020-01-08 ENCOUNTER — TRANSCRIBE ORDERS (OUTPATIENT)
Dept: LAB | Facility: MEDICAL CENTER | Age: 75
End: 2020-01-08

## 2020-01-08 DIAGNOSIS — I10 HYPERTENSION, UNSPECIFIED TYPE: ICD-10-CM

## 2020-01-08 DIAGNOSIS — E78.5 HYPERLIPIDEMIA, UNSPECIFIED HYPERLIPIDEMIA TYPE: ICD-10-CM

## 2020-01-08 DIAGNOSIS — E03.9 HYPOTHYROIDISM, UNSPECIFIED TYPE: ICD-10-CM

## 2020-01-08 DIAGNOSIS — M10.9 GOUT, UNSPECIFIED CAUSE, UNSPECIFIED CHRONICITY, UNSPECIFIED SITE: ICD-10-CM

## 2020-01-08 DIAGNOSIS — M89.9 BONE DISEASE: ICD-10-CM

## 2020-01-08 DIAGNOSIS — M10.9 GOUT, UNSPECIFIED CAUSE, UNSPECIFIED CHRONICITY, UNSPECIFIED SITE: Primary | ICD-10-CM

## 2020-01-08 LAB
25(OH)D3 SERPL-MCNC: 40.1 NG/ML (ref 30–100)
CHOLEST SERPL-MCNC: 172 MG/DL (ref 50–200)
EST. AVERAGE GLUCOSE BLD GHB EST-MCNC: 123 MG/DL
HBA1C MFR BLD: 5.9 % (ref 4.2–6.3)
HDLC SERPL-MCNC: 35 MG/DL
LDLC SERPL CALC-MCNC: 105 MG/DL (ref 0–100)
NONHDLC SERPL-MCNC: 137 MG/DL
T4 FREE SERPL-MCNC: 0.87 NG/DL (ref 0.76–1.46)
TRIGL SERPL-MCNC: 158 MG/DL
TSH SERPL DL<=0.05 MIU/L-ACNC: 1.04 UIU/ML (ref 0.36–3.74)
URATE SERPL-MCNC: 5.6 MG/DL (ref 2–6.8)

## 2020-01-08 PROCEDURE — 82306 VITAMIN D 25 HYDROXY: CPT

## 2020-01-08 PROCEDURE — 36415 COLL VENOUS BLD VENIPUNCTURE: CPT

## 2020-01-08 PROCEDURE — 84439 ASSAY OF FREE THYROXINE: CPT

## 2020-01-08 PROCEDURE — 83036 HEMOGLOBIN GLYCOSYLATED A1C: CPT

## 2020-01-08 PROCEDURE — 80061 LIPID PANEL: CPT

## 2020-01-08 PROCEDURE — 84550 ASSAY OF BLOOD/URIC ACID: CPT

## 2020-01-08 PROCEDURE — 84443 ASSAY THYROID STIM HORMONE: CPT

## 2020-01-13 ENCOUNTER — AMBUL SURGICAL CARE (OUTPATIENT)
Dept: URBAN - METROPOLITAN AREA SURGERY 32 | Facility: SURGERY | Age: 75
Setting detail: OPHTHALMOLOGY
End: 2020-01-13
Payer: COMMERCIAL

## 2020-01-13 DIAGNOSIS — H25.012: ICD-10-CM

## 2020-01-13 DIAGNOSIS — H52.222: ICD-10-CM

## 2020-01-13 PROCEDURE — 66984 XCAPSL CTRC RMVL W/O ECP: CPT | Performed by: OPHTHALMOLOGY

## 2020-01-13 PROCEDURE — G8918 PT W/O PREOP ORDER IV AB PRO: HCPCS | Performed by: OPHTHALMOLOGY

## 2020-01-13 PROCEDURE — V2788 PRESBYOPIA-CORRECT FUNCTION: HCPCS | Performed by: OPHTHALMOLOGY

## 2020-01-13 PROCEDURE — LENSX LASER ASSISTED CATARACT EXTRACTION W/POSSIBLE IOL IMPLANT: Performed by: OPHTHALMOLOGY

## 2020-01-13 PROCEDURE — G8907 PT DOC NO EVENTS ON DISCHARG: HCPCS | Performed by: OPHTHALMOLOGY

## 2020-01-14 ENCOUNTER — RX ONLY (RX ONLY)
Age: 75
End: 2020-01-14

## 2020-01-14 ENCOUNTER — DOCTOR'S OFFICE (OUTPATIENT)
Dept: URBAN - NONMETROPOLITAN AREA CLINIC 1 | Facility: CLINIC | Age: 75
Setting detail: OPHTHALMOLOGY
End: 2020-01-14
Payer: COMMERCIAL

## 2020-01-14 ENCOUNTER — OPTICAL OFFICE (OUTPATIENT)
Dept: URBAN - NONMETROPOLITAN AREA CLINIC 4 | Facility: CLINIC | Age: 75
Setting detail: OPHTHALMOLOGY
End: 2020-01-14

## 2020-01-14 DIAGNOSIS — H25.011: ICD-10-CM

## 2020-01-14 DIAGNOSIS — H04.121: ICD-10-CM

## 2020-01-14 DIAGNOSIS — H52.7: ICD-10-CM

## 2020-01-14 DIAGNOSIS — H04.122: ICD-10-CM

## 2020-01-14 PROCEDURE — V2100 LENS SPHER SINGLE PLANO 4.00: HCPCS | Performed by: OPHTHALMOLOGY

## 2020-01-14 PROCEDURE — 99024 POSTOP FOLLOW-UP VISIT: CPT | Performed by: OPHTHALMOLOGY

## 2020-01-14 ASSESSMENT — LID POSITION - DERMATOCHALASIS
OD_DERMATOCHALASIS: RUL 1+
OS_DERMATOCHALASIS: LUL 2+

## 2020-01-15 ENCOUNTER — DOCTOR'S OFFICE (OUTPATIENT)
Dept: URBAN - NONMETROPOLITAN AREA CLINIC 1 | Facility: CLINIC | Age: 75
Setting detail: OPHTHALMOLOGY
End: 2020-01-15
Payer: COMMERCIAL

## 2020-01-15 DIAGNOSIS — H25.011: ICD-10-CM

## 2020-01-15 PROCEDURE — 92136 OPHTHALMIC BIOMETRY: CPT | Performed by: OPHTHALMOLOGY

## 2020-01-15 ASSESSMENT — VISUAL ACUITY
OS_BCVA: 20/30-1
OD_BCVA: 20/40-1

## 2020-01-15 ASSESSMENT — REFRACTION_CURRENTRX
OS_CYLINDER: -2.25
OS_SPHERE: +0.50
OS_VPRISM_DIRECTION: PROGS
OS_OVR_VA: 20/
OD_VPRISM_DIRECTION: PROGS
OD_OVR_VA: 20/
OD_ADD: +1.00
OS_ADD: +1.00
OD_SPHERE: +0.50
OD_AXIS: 5
OS_AXIS: 153
OD_CYLINDER: -1.75

## 2020-01-15 ASSESSMENT — REFRACTION_AUTOREFRACTION
OS_SPHERE: +1.25
OD_AXIS: 175
OS_AXIS: 168
OD_CYLINDER: -1.75
OS_CYLINDER: -0.25
OD_SPHERE: +0.75

## 2020-01-15 ASSESSMENT — SPHEQUIV_DERIVED
OD_SPHEQUIV: -0.125
OS_SPHEQUIV: 1.125

## 2020-01-21 ENCOUNTER — AMBUL SURGICAL CARE (OUTPATIENT)
Dept: URBAN - METROPOLITAN AREA SURGERY 32 | Facility: SURGERY | Age: 75
Setting detail: OPHTHALMOLOGY
End: 2020-01-21
Payer: COMMERCIAL

## 2020-01-21 DIAGNOSIS — H25.11: ICD-10-CM

## 2020-01-21 PROCEDURE — S9986 NOT MEDICALLY NECESSARY SVC: HCPCS | Performed by: OPHTHALMOLOGY

## 2020-01-21 PROCEDURE — LENSX LASER ASSISTED CATARACT EXTRACTION W/POSSIBLE IOL IMPLANT: Performed by: OPHTHALMOLOGY

## 2020-01-21 PROCEDURE — V2788 PRESBYOPIA-CORRECT FUNCTION: HCPCS | Performed by: OPHTHALMOLOGY

## 2020-01-27 ENCOUNTER — AMBUL SURGICAL CARE (OUTPATIENT)
Dept: URBAN - METROPOLITAN AREA SURGERY 32 | Facility: SURGERY | Age: 75
Setting detail: OPHTHALMOLOGY
End: 2020-01-27
Payer: COMMERCIAL

## 2020-01-27 DIAGNOSIS — H52.221: ICD-10-CM

## 2020-01-27 DIAGNOSIS — H25.011: ICD-10-CM

## 2020-01-27 PROCEDURE — G8907 PT DOC NO EVENTS ON DISCHARG: HCPCS | Performed by: CLINIC/CENTER

## 2020-01-27 PROCEDURE — LENSX LASER ASSISTED CATARACT EXTRACTION W/POSSIBLE IOL IMPLANT: Performed by: CLINIC/CENTER

## 2020-01-27 PROCEDURE — 66984 XCAPSL CTRC RMVL W/O ECP: CPT | Performed by: OPHTHALMOLOGY

## 2020-01-27 PROCEDURE — G8918 PT W/O PREOP ORDER IV AB PRO: HCPCS | Performed by: CLINIC/CENTER

## 2020-01-27 PROCEDURE — G8918 PT W/O PREOP ORDER IV AB PRO: HCPCS | Performed by: OPHTHALMOLOGY

## 2020-01-27 PROCEDURE — G8907 PT DOC NO EVENTS ON DISCHARG: HCPCS | Performed by: OPHTHALMOLOGY

## 2020-01-27 PROCEDURE — 66984 XCAPSL CTRC RMVL W/O ECP: CPT | Performed by: CLINIC/CENTER

## 2020-01-27 PROCEDURE — LENSX LASER ASSISTED CATARACT EXTRACTION W/POSSIBLE IOL IMPLANT: Performed by: OPHTHALMOLOGY

## 2020-01-28 ENCOUNTER — DOCTOR'S OFFICE (OUTPATIENT)
Dept: URBAN - NONMETROPOLITAN AREA CLINIC 1 | Facility: CLINIC | Age: 75
Setting detail: OPHTHALMOLOGY
End: 2020-01-28
Payer: COMMERCIAL

## 2020-01-28 DIAGNOSIS — Z96.1: ICD-10-CM

## 2020-01-28 PROBLEM — H25.011 CATARACT CORTICAL SENILE; RIGHT EYE: Status: RESOLVED | Noted: 2020-01-14 | Resolved: 2020-01-28

## 2020-01-28 PROCEDURE — 99024 POSTOP FOLLOW-UP VISIT: CPT | Performed by: OPHTHALMOLOGY

## 2020-01-28 ASSESSMENT — REFRACTION_CURRENTRX
OS_CYLINDER: -2.25
OS_ADD: +1.00
OD_AXIS: 5
OD_CYLINDER: -1.75
OD_OVR_VA: 20/
OS_SPHERE: +0.50
OS_OVR_VA: 20/
OS_AXIS: 153
OD_ADD: +1.00
OD_VPRISM_DIRECTION: PROGS
OS_VPRISM_DIRECTION: PROGS
OD_SPHERE: +0.50

## 2020-01-28 ASSESSMENT — REFRACTION_AUTOREFRACTION
OD_AXIS: 045
OS_CYLINDER: -0.25
OD_SPHERE: +0.50
OS_SPHERE: +0.50
OD_CYLINDER: -0.50
OS_AXIS: 063

## 2020-01-28 ASSESSMENT — LID POSITION - DERMATOCHALASIS
OD_DERMATOCHALASIS: RUL 1+
OS_DERMATOCHALASIS: LUL 2+

## 2020-01-28 ASSESSMENT — VISUAL ACUITY
OD_BCVA: 20/25+2
OS_BCVA: 20/40-1

## 2020-01-28 ASSESSMENT — SPHEQUIV_DERIVED
OD_SPHEQUIV: 0.25
OS_SPHEQUIV: 0.375

## 2020-02-12 ENCOUNTER — DOCTOR'S OFFICE (OUTPATIENT)
Dept: URBAN - NONMETROPOLITAN AREA CLINIC 1 | Facility: CLINIC | Age: 75
Setting detail: OPHTHALMOLOGY
End: 2020-02-12
Payer: COMMERCIAL

## 2020-02-12 ENCOUNTER — RX ONLY (RX ONLY)
Age: 75
End: 2020-02-12

## 2020-02-12 DIAGNOSIS — H26.491: ICD-10-CM

## 2020-02-12 DIAGNOSIS — H04.122: ICD-10-CM

## 2020-02-12 DIAGNOSIS — Z96.1: ICD-10-CM

## 2020-02-12 DIAGNOSIS — H04.121: ICD-10-CM

## 2020-02-12 PROCEDURE — 99024 POSTOP FOLLOW-UP VISIT: CPT | Performed by: OPHTHALMOLOGY

## 2020-02-12 ASSESSMENT — LID POSITION - DERMATOCHALASIS
OD_DERMATOCHALASIS: RUL 1+
OS_DERMATOCHALASIS: LUL 2+

## 2020-02-12 ASSESSMENT — SPHEQUIV_DERIVED
OS_SPHEQUIV: 0.25
OD_SPHEQUIV: 0.375

## 2020-02-12 ASSESSMENT — REFRACTION_CURRENTRX
OS_ADD: +1.00
OD_OVR_VA: 20/
OS_OVR_VA: 20/
OS_VPRISM_DIRECTION: PROGS
OS_AXIS: 153
OD_SPHERE: +0.50
OD_ADD: +1.00
OD_AXIS: 5
OD_VPRISM_DIRECTION: PROGS
OS_SPHERE: +0.50
OS_CYLINDER: -2.25
OD_CYLINDER: -1.75

## 2020-02-12 ASSESSMENT — VISUAL ACUITY
OS_BCVA: 20/30-1
OD_BCVA: 20/30-2

## 2020-02-12 ASSESSMENT — REFRACTION_AUTOREFRACTION
OD_AXIS: 055
OS_SPHERE: +0.25
OD_SPHERE: +0.75
OD_CYLINDER: -0.75
OS_CYLINDER: 0.00

## 2020-02-26 ENCOUNTER — DOCTOR'S OFFICE (OUTPATIENT)
Dept: URBAN - NONMETROPOLITAN AREA CLINIC 1 | Facility: CLINIC | Age: 75
Setting detail: OPHTHALMOLOGY
End: 2020-02-26
Payer: COMMERCIAL

## 2020-02-26 DIAGNOSIS — Z96.1: ICD-10-CM

## 2020-02-26 DIAGNOSIS — H01.005: ICD-10-CM

## 2020-02-26 DIAGNOSIS — H01.004: ICD-10-CM

## 2020-02-26 DIAGNOSIS — H10.503: ICD-10-CM

## 2020-02-26 DIAGNOSIS — H01.002: ICD-10-CM

## 2020-02-26 DIAGNOSIS — H01.001: ICD-10-CM

## 2020-02-26 DIAGNOSIS — H04.122: ICD-10-CM

## 2020-02-26 DIAGNOSIS — H04.121: ICD-10-CM

## 2020-02-26 PROCEDURE — 99024 POSTOP FOLLOW-UP VISIT: CPT | Performed by: OPHTHALMOLOGY

## 2020-02-26 PROCEDURE — 83861 MICROFLUID ANALY TEARS: CPT | Performed by: OPHTHALMOLOGY

## 2020-02-26 ASSESSMENT — LID EXAM ASSESSMENTS
OS_BLEPHARITIS: LLL LUL 1+
OD_BLEPHARITIS: RLL RUL 1+

## 2020-02-26 ASSESSMENT — LID POSITION - DERMATOCHALASIS
OD_DERMATOCHALASIS: RUL 1+
OS_DERMATOCHALASIS: LUL 2+

## 2020-02-26 ASSESSMENT — CONFRONTATIONAL VISUAL FIELD TEST (CVF)
OS_FINDINGS: FULL
OD_FINDINGS: FULL

## 2020-03-02 ENCOUNTER — HOSPITAL ENCOUNTER (OUTPATIENT)
Dept: NON INVASIVE DIAGNOSTICS | Facility: HOSPITAL | Age: 75
Discharge: HOME | End: 2020-03-02
Payer: MEDICARE

## 2020-03-02 DIAGNOSIS — I83.892 SYMPTOMATIC VARICOSE VEINS OF LEFT LOWER EXTREMITY: ICD-10-CM

## 2020-03-02 PROCEDURE — 93971 EXTREMITY STUDY: CPT

## 2020-03-02 PROCEDURE — 93971 EXTREMITY STUDY: CPT | Performed by: SURGERY

## 2020-03-02 ASSESSMENT — SPHEQUIV_DERIVED
OS_SPHEQUIV: 0.5
OD_SPHEQUIV: 0.25

## 2020-03-02 ASSESSMENT — REFRACTION_CURRENTRX
OS_AXIS: 153
OS_ADD: +1.00
OS_SPHERE: +0.50
OD_CYLINDER: -1.75
OD_VPRISM_DIRECTION: PROGS
OS_OVR_VA: 20/
OS_CYLINDER: -2.25
OS_VPRISM_DIRECTION: PROGS
OD_OVR_VA: 20/
OD_ADD: +1.00
OD_AXIS: 5
OD_SPHERE: +0.50

## 2020-03-02 ASSESSMENT — REFRACTION_AUTOREFRACTION
OD_AXIS: 055
OS_CYLINDER: -0.50
OD_SPHERE: +1.00
OD_CYLINDER: -1.50
OS_SPHERE: +0.75
OS_AXIS: 082

## 2020-03-02 ASSESSMENT — VISUAL ACUITY
OS_BCVA: 20/30-1
OD_BCVA: 20/25-1

## 2020-03-04 ENCOUNTER — RX ONLY (RX ONLY)
Age: 75
End: 2020-03-04

## 2020-03-04 ENCOUNTER — DOCTOR'S OFFICE (OUTPATIENT)
Dept: URBAN - NONMETROPOLITAN AREA CLINIC 1 | Facility: CLINIC | Age: 75
Setting detail: OPHTHALMOLOGY
End: 2020-03-04
Payer: COMMERCIAL

## 2020-03-04 DIAGNOSIS — H10.503: ICD-10-CM

## 2020-03-04 DIAGNOSIS — H01.002: ICD-10-CM

## 2020-03-04 DIAGNOSIS — H10.13: ICD-10-CM

## 2020-03-04 DIAGNOSIS — H26.491: ICD-10-CM

## 2020-03-04 DIAGNOSIS — H04.121: ICD-10-CM

## 2020-03-04 DIAGNOSIS — H26.492: ICD-10-CM

## 2020-03-04 DIAGNOSIS — Z96.1: ICD-10-CM

## 2020-03-04 DIAGNOSIS — H01.004: ICD-10-CM

## 2020-03-04 DIAGNOSIS — H01.005: ICD-10-CM

## 2020-03-04 DIAGNOSIS — H04.122: ICD-10-CM

## 2020-03-04 DIAGNOSIS — H01.001: ICD-10-CM

## 2020-03-04 PROCEDURE — 99024 POSTOP FOLLOW-UP VISIT: CPT | Performed by: OPHTHALMOLOGY

## 2020-03-04 ASSESSMENT — VISUAL ACUITY
OS_BCVA: 20/30-2
OD_BCVA: 20/25-1

## 2020-03-04 ASSESSMENT — REFRACTION_AUTOREFRACTION
OS_SPHERE: +0.50
OS_AXIS: 084
OD_SPHERE: +1.00
OD_AXIS: 057
OD_CYLINDER: -1.00
OS_CYLINDER: -0.50

## 2020-03-04 ASSESSMENT — REFRACTION_CURRENTRX
OD_CYLINDER: -1.75
OS_SPHERE: +0.50
OD_VPRISM_DIRECTION: PROGS
OS_ADD: +1.00
OD_AXIS: 5
OD_OVR_VA: 20/
OS_VPRISM_DIRECTION: PROGS
OS_OVR_VA: 20/
OS_CYLINDER: -2.25
OD_SPHERE: +0.50
OD_ADD: +1.00
OS_AXIS: 153

## 2020-03-04 ASSESSMENT — LID POSITION - DERMATOCHALASIS
OS_DERMATOCHALASIS: LUL 2+
OD_DERMATOCHALASIS: RUL 1+

## 2020-03-04 ASSESSMENT — LID EXAM ASSESSMENTS
OD_BLEPHARITIS: RLL RUL 1+
OS_BLEPHARITIS: LLL LUL 1+

## 2020-03-04 ASSESSMENT — SPHEQUIV_DERIVED
OS_SPHEQUIV: 0.25
OD_SPHEQUIV: 0.5

## 2020-03-04 ASSESSMENT — CONFRONTATIONAL VISUAL FIELD TEST (CVF)
OS_FINDINGS: FULL
OD_FINDINGS: FULL

## 2020-03-04 ASSESSMENT — LACRIMAL DUCT - ASSESSMENT
OS_LACRIMAL_DUCT: EPIPHORA
OD_LACRIMAL_DUCT: EPIPHORA

## 2020-03-09 ENCOUNTER — OFFICE VISIT (OUTPATIENT)
Dept: VASCULAR SURGERY | Facility: HOSPITAL | Age: 75
End: 2020-03-09
Payer: MEDICARE

## 2020-03-09 VITALS
DIASTOLIC BLOOD PRESSURE: 74 MMHG | TEMPERATURE: 99.6 F | HEIGHT: 66 IN | BODY MASS INDEX: 35.89 KG/M2 | HEART RATE: 83 BPM | WEIGHT: 223.33 LBS | SYSTOLIC BLOOD PRESSURE: 160 MMHG

## 2020-03-09 DIAGNOSIS — I83.893 VARICOSE VEINS OF BOTH LOWER EXTREMITIES WITH COMPLICATIONS: ICD-10-CM

## 2020-03-09 DIAGNOSIS — I80.02 THROMBOPHLEBITIS OF SUPERFICIAL VEINS OF LEFT LOWER EXTREMITY: Primary | ICD-10-CM

## 2020-03-09 PROCEDURE — 99214 OFFICE O/P EST MOD 30 MIN: CPT | Performed by: SURGERY

## 2020-03-09 NOTE — PATIENT INSTRUCTIONS
Varicose Veins   AMBULATORY CARE:   Varicose veins  are veins that become large, twisted, and swollen  They are common on the back of the calves, knees, and thighs  Varicose veins are caused by valves in your veins that do not work properly  This causes blood to collect and increase pressure in the veins of your legs  The increased pressure causes your veins to stretch, get larger, swell, and twist        Common symptoms include the following: Your symptoms may be worse after you stand or sit for long periods of time  You may have any of the following:  · Blue, purple, or bulging veins in your legs     · Pain, swelling, or muscle cramps in your legs    · Feeling of fatigue or heaviness in your legs    · Cramping in your legs  Seek care immediately if:   · You have a wound that does not heal or is infected  · You have an injury that has broken your skin and caused your varicose veins to bleed  · Your leg is swollen and hard  · You notice that your legs or feet are turning blue or black  · Your leg feels warm, tender, and painful  It may look swollen and red  Contact your healthcare provider if:   · You have pain in your leg that does not go away or gets worse  · You notice sudden large bruising on your legs  · You have a rash on your leg  · Your symptoms keep you from doing your daily activities  · You have questions or concerns about your condition or care  Treatment of varicose veins  aims to decrease symptoms, improve appearance, and prevent further problems  Treatment will depend on which veins are affected and how severe your condition is  You may need procedures to treat or remove your varicose veins  For example, your healthcare provider may inject a solution or use a laser to close the varicose veins  Surgery to remove long veins may also be done  Ask your healthcare provider for more information about procedures used to treat varicose veins    Manage varicose veins:   · Do not sit or stand for long periods of time  This can cause the blood to collect in your legs and make your symptoms worse  Bend or rotate your ankles several times every hour  Walk around for a few minutes every hour to get blood moving in your legs  · Do not cross your legs when you sit  This decreases blood flow to your feet and can make your symptoms worse  · Do not wear tight clothing or shoes  Do not wear high-heeled shoes  Do not wear clothes that are tight around the waist or knees  · Maintain a healthy weight  Being overweight or obese can make your varicose veins worse  Ask your healthcare provider how much you should weigh  Ask him or her to help you create a weight loss plan if you are overweight  · Wear pressure stockings as directed  The stockings are tight and put pressure on your legs  They improve blood flow and help prevent clots  · Elevate your legs  Keep them above the level of your heart for 15 to 30 minutes several times a day  You can also prop the end of your bed up slightly to elevate your legs while you sleep  This will help blood to flow back to your heart  · Get regular exercise  Talk to your healthcare provider about the best exercise plan for you  Exercise can improve blood flow to your legs and feet  Follow up with your healthcare provider as directed:  Write down your questions so you remember to ask them during your visits  © 2017 [employer] [employer] Information is for End User's use only and may not be sold, redistributed or otherwise used for commercial purposes  All illustrations and images included in CareNotes® are the copyrighted property of [employer]  or [employer]  The above information is an  only  It is not intended as medical advice for individual conditions or treatments  Talk to your doctor, nurse or pharmacist before following any medical regimen to see if it is safe and effective for you

## 2020-03-09 NOTE — PROGRESS NOTES
Assessment/Plan: 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  Recent bout of left thigh thrombophlebitis  Varicose veins of both lower extremities with complications  Patient with history of left greater saphenous vein intervention in 2005  Patient unclear of the details  Patient reports back in December after she changed her exercise routine she noted bulging veins in the left medial thigh  She was seen in our office back in December  Compression stockings were recommended  A venous reflux study suggest a small tortuous greater saphenous vein with with distal thrombophlebitis presumably from the prior procedure  The greater saphenous vein however does appear to be patent proximally  She does have deep venous incompetence  She also has some incompetent anterior accessory saphenous vein branch as well as perforators  I did recommend EVLT of anterior accessory saphenous vein branch and stab phlebectomies  Patient at this time denies any pain and is not interested in proceeding with any intervention  Recommend continued compression stockings  I did recommend         Diagnoses and all orders for this visit:    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    Varicose veins of both lower extremities with complications  -     Compression Stocking          Subjective:      Patient ID: Delmar Duval is a 70 y o  female  Pt is here today to review results of LEVDR done 3/2/2020  She has a hx of Left leg EVLT done 2005 [facility]  Pt c/o redness and swelling in her left thigh since traveling on an airplane  Pt took prophylactic ASA 81 for her trip  She has bulging, painful veins in both legs  Left is worse than right  Pt has been wearing compression stockings, elevating and exercising  Pt denies any open wound or bleeding veins  Payton Espinoza returns to the office to reassess her left lower extremity varicosities as well as review venous reflux study  She denies any pain    She denies any recurrent bouts of thrombophlebitis  The following portions of the patient's history were reviewed and updated as appropriate: allergies, current medications, past family history, past medical history, past social history, past surgical history and problem list     Review of Systems   Constitutional: Negative  HENT: Negative  Eyes: Positive for pain and redness  Respiratory: Negative  Cardiovascular: Negative  Gastrointestinal: Negative  Endocrine: Negative  Genitourinary: Negative  Musculoskeletal: Positive for back pain  Skin: Negative  Allergic/Immunologic: Positive for environmental allergies  Neurological: Negative  Hematological: Negative  Psychiatric/Behavioral: The patient is nervous/anxious  I have personally reviewed the ROS entered by MA and agree as documented  Objective:      /74 (BP Location: Left arm, Patient Position: Sitting, Cuff Size: Standard)   Pulse 83   Temp 99 6 °F (37 6 °C) (Tympanic)   Ht 5' 6" (1 676 m)   Wt 101 kg (223 lb 5 2 oz)   BMI 36 05 kg/m²          Physical Exam   Constitutional: She is oriented to person, place, and time  She appears well-developed and well-nourished  No distress  HENT:   Head: Normocephalic and atraumatic  Eyes: Conjunctivae and EOM are normal  No scleral icterus  Neck: Normal range of motion  Neck supple  No tracheal deviation present  Cardiovascular: Normal rate, regular rhythm and normal heart sounds  Pulmonary/Chest: Effort normal and breath sounds normal    Abdominal: Soft  She exhibits no distension and no mass (no appreciable aortic pulsation/aneurysm)  There is no tenderness  There is no rebound and no guarding  Musculoskeletal: Normal range of motion  Neurological: She is alert and oriented to person, place, and time  Skin: Skin is warm and dry  Prominent varicosities left mid medial thigh with some overlying skin staining     Psychiatric: She has a normal mood and affect  Her behavior is normal          Venous reflux study:  CONCLUSION:     Impression:  RIGHT LIMB:  Not ordered  LEFT LIMB:  Deep venous incompetence is noted  The great saphenous vein is incompetent  The great saphenous vein remains within the saphenous compartment in the thigh,  however is tortuous at the proximal thigh and there is evidence of chronic  superficial thrombophlebitis at the distal thigh  The small saphenous vein is competent and communicates with the popliteal vein  There is a large incompetent anterior accessory vein from the sapheno-femoral  junction and leaves the fascia at the mid thigh  There is evidence of incompetent perforators in the thigh  There is no evidence of deep vein thrombosis in the CFV, the proximal PFV, the  femoral vein and the popliteal vein

## 2020-03-09 NOTE — ASSESSMENT & PLAN NOTE
Patient with history of left greater saphenous vein intervention in 2005  Patient unclear of the details  Patient reports back in December after she changed her exercise routine she noted bulging veins in the left medial thigh  She was seen in our office back in December  Compression stockings were recommended  A venous reflux study suggest a small tortuous greater saphenous vein with with distal thrombophlebitis presumably from the prior procedure  The greater saphenous vein however does appear to be patent proximally  She does have deep venous incompetence  She also has some incompetent anterior accessory saphenous vein branch as well as perforators  I did recommend EVLT of anterior accessory saphenous vein branch and stab phlebectomies  Patient at this time denies any pain and is not interested in proceeding with any intervention  Recommend continued compression stockings    I did recommend

## 2020-03-24 ENCOUNTER — DOCTOR'S OFFICE (OUTPATIENT)
Dept: URBAN - NONMETROPOLITAN AREA CLINIC 1 | Facility: CLINIC | Age: 75
Setting detail: OPHTHALMOLOGY
End: 2020-03-24
Payer: COMMERCIAL

## 2020-03-24 ENCOUNTER — RX ONLY (RX ONLY)
Age: 75
End: 2020-03-24

## 2020-03-24 DIAGNOSIS — Z96.1: ICD-10-CM

## 2020-03-24 DIAGNOSIS — H10.13: ICD-10-CM

## 2020-03-24 DIAGNOSIS — H01.002: ICD-10-CM

## 2020-03-24 DIAGNOSIS — H04.121: ICD-10-CM

## 2020-03-24 DIAGNOSIS — H01.005: ICD-10-CM

## 2020-03-24 DIAGNOSIS — H01.004: ICD-10-CM

## 2020-03-24 DIAGNOSIS — H04.122: ICD-10-CM

## 2020-03-24 DIAGNOSIS — H01.001: ICD-10-CM

## 2020-03-24 DIAGNOSIS — H10.503: ICD-10-CM

## 2020-03-24 PROCEDURE — 99212 OFFICE O/P EST SF 10 MIN: CPT | Performed by: OPHTHALMOLOGY

## 2020-03-24 PROCEDURE — 99024 POSTOP FOLLOW-UP VISIT: CPT | Performed by: OPHTHALMOLOGY

## 2020-03-24 ASSESSMENT — VISUAL ACUITY
OS_BCVA: 20/40
OD_BCVA: 20/25-2

## 2020-03-24 ASSESSMENT — REFRACTION_AUTOREFRACTION
OD_AXIS: 057
OD_SPHERE: +1.00
OS_SPHERE: +0.50
OS_AXIS: 084
OS_CYLINDER: -0.50
OD_CYLINDER: -1.00

## 2020-03-24 ASSESSMENT — REFRACTION_CURRENTRX
OD_ADD: +1.00
OS_ADD: +1.00
OD_CYLINDER: -1.75
OS_AXIS: 153
OS_SPHERE: +0.50
OD_VPRISM_DIRECTION: PROGS
OS_CYLINDER: -2.25
OD_AXIS: 5
OS_VPRISM_DIRECTION: PROGS
OS_OVR_VA: 20/
OD_SPHERE: +0.50
OD_OVR_VA: 20/

## 2020-03-24 ASSESSMENT — SPHEQUIV_DERIVED
OD_SPHEQUIV: 0.5
OS_SPHEQUIV: 0.25

## 2020-03-24 ASSESSMENT — CONFRONTATIONAL VISUAL FIELD TEST (CVF)
OD_FINDINGS: FULL
OS_FINDINGS: FULL

## 2020-03-31 ENCOUNTER — DOCTOR'S OFFICE (OUTPATIENT)
Dept: URBAN - NONMETROPOLITAN AREA CLINIC 1 | Facility: CLINIC | Age: 75
Setting detail: OPHTHALMOLOGY
End: 2020-03-31
Payer: COMMERCIAL

## 2020-03-31 ENCOUNTER — RX ONLY (RX ONLY)
Age: 75
End: 2020-03-31

## 2020-03-31 DIAGNOSIS — H10.13: ICD-10-CM

## 2020-03-31 DIAGNOSIS — Z96.1: ICD-10-CM

## 2020-03-31 DIAGNOSIS — H10.503: ICD-10-CM

## 2020-03-31 DIAGNOSIS — H01.005: ICD-10-CM

## 2020-03-31 DIAGNOSIS — H01.001: ICD-10-CM

## 2020-03-31 DIAGNOSIS — H04.121: ICD-10-CM

## 2020-03-31 DIAGNOSIS — H04.122: ICD-10-CM

## 2020-03-31 DIAGNOSIS — H01.004: ICD-10-CM

## 2020-03-31 DIAGNOSIS — H01.002: ICD-10-CM

## 2020-03-31 PROCEDURE — 92012 INTRM OPH EXAM EST PATIENT: CPT | Performed by: OPHTHALMOLOGY

## 2020-03-31 ASSESSMENT — REFRACTION_CURRENTRX
OS_ADD: +1.00
OS_SPHERE: +0.50
OS_VPRISM_DIRECTION: PROGS
OD_ADD: +1.00
OD_OVR_VA: 20/
OS_AXIS: 153
OD_CYLINDER: -1.75
OD_SPHERE: +0.50
OD_AXIS: 5
OD_VPRISM_DIRECTION: PROGS
OS_CYLINDER: -2.25
OS_OVR_VA: 20/

## 2020-03-31 ASSESSMENT — LACRIMAL DUCT - ASSESSMENT
OS_LACRIMAL_DUCT: EPIPHORA
OD_LACRIMAL_DUCT: EPIPHORA

## 2020-03-31 ASSESSMENT — SPHEQUIV_DERIVED: OD_SPHEQUIV: 0.125

## 2020-03-31 ASSESSMENT — REFRACTION_AUTOREFRACTION
OS_CYLINDER: SPH
OD_SPHERE: +1.00
OS_SPHERE: +0.50
OD_AXIS: 047
OD_CYLINDER: -1.75

## 2020-03-31 ASSESSMENT — LID EXAM ASSESSMENTS
OD_BLEPHARITIS: RLL RUL 1+
OS_BLEPHARITIS: LLL LUL 1+

## 2020-03-31 ASSESSMENT — CONFRONTATIONAL VISUAL FIELD TEST (CVF)
OS_FINDINGS: FULL
OD_FINDINGS: FULL

## 2020-03-31 ASSESSMENT — VISUAL ACUITY
OS_BCVA: 20/30-1
OD_BCVA: 20/25+1

## 2020-03-31 ASSESSMENT — LID POSITION - DERMATOCHALASIS
OS_DERMATOCHALASIS: LUL 2+
OD_DERMATOCHALASIS: RUL 1+

## 2020-05-08 ENCOUNTER — DOCTOR'S OFFICE (OUTPATIENT)
Dept: URBAN - NONMETROPOLITAN AREA CLINIC 1 | Facility: CLINIC | Age: 75
Setting detail: OPHTHALMOLOGY
End: 2020-05-08
Payer: COMMERCIAL

## 2020-05-08 DIAGNOSIS — Z96.1: ICD-10-CM

## 2020-05-08 DIAGNOSIS — H01.004: ICD-10-CM

## 2020-05-08 DIAGNOSIS — H01.001: ICD-10-CM

## 2020-05-08 DIAGNOSIS — H10.503: ICD-10-CM

## 2020-05-08 DIAGNOSIS — H04.122: ICD-10-CM

## 2020-05-08 DIAGNOSIS — H01.005: ICD-10-CM

## 2020-05-08 DIAGNOSIS — H26.491: ICD-10-CM

## 2020-05-08 DIAGNOSIS — H10.13: ICD-10-CM

## 2020-05-08 DIAGNOSIS — H04.223: ICD-10-CM

## 2020-05-08 DIAGNOSIS — H04.121: ICD-10-CM

## 2020-05-08 DIAGNOSIS — H01.002: ICD-10-CM

## 2020-05-08 PROCEDURE — 92012 INTRM OPH EXAM EST PATIENT: CPT | Performed by: OPHTHALMOLOGY

## 2020-05-08 ASSESSMENT — LID EXAM ASSESSMENTS
OD_BLEPHARITIS: RLL RUL 1+
OS_BLEPHARITIS: LLL LUL 1+

## 2020-05-08 ASSESSMENT — REFRACTION_AUTOREFRACTION
OS_SPHERE: +0.50
OS_CYLINDER: -0.50
OD_SPHERE: +0.25
OS_AXIS: 074
OD_CYLINDER: -1.00
OD_AXIS: 048

## 2020-05-08 ASSESSMENT — LID POSITION - DERMATOCHALASIS
OD_DERMATOCHALASIS: RUL 1+
OS_DERMATOCHALASIS: LUL 2+

## 2020-05-08 ASSESSMENT — REFRACTION_CURRENTRX
OD_CYLINDER: -1.75
OD_VPRISM_DIRECTION: PROGS
OS_VPRISM_DIRECTION: PROGS
OD_AXIS: 5
OS_CYLINDER: -2.25
OD_ADD: +1.00
OD_SPHERE: +0.50
OS_SPHERE: +0.50
OS_AXIS: 153
OS_OVR_VA: 20/
OS_ADD: +1.00
OD_OVR_VA: 20/

## 2020-05-08 ASSESSMENT — VISUAL ACUITY
OD_BCVA: 20/25-1
OS_BCVA: 20/40-2

## 2020-05-08 ASSESSMENT — CONFRONTATIONAL VISUAL FIELD TEST (CVF)
OS_FINDINGS: FULL
OD_FINDINGS: FULL

## 2020-05-08 ASSESSMENT — SPHEQUIV_DERIVED
OD_SPHEQUIV: -0.25
OS_SPHEQUIV: 0.25

## 2020-05-19 ENCOUNTER — AMBUL SURGICAL CARE (OUTPATIENT)
Dept: URBAN - NONMETROPOLITAN AREA SURGERY 1 | Facility: SURGERY | Age: 75
Setting detail: OPHTHALMOLOGY
End: 2020-05-19
Payer: COMMERCIAL

## 2020-05-19 DIAGNOSIS — H26.491: ICD-10-CM

## 2020-05-19 PROCEDURE — G8918 PT W/O PREOP ORDER IV AB PRO: HCPCS | Performed by: OPHTHALMOLOGY

## 2020-05-19 PROCEDURE — G8907 PT DOC NO EVENTS ON DISCHARG: HCPCS | Performed by: CLINIC/CENTER

## 2020-05-19 PROCEDURE — G8918 PT W/O PREOP ORDER IV AB PRO: HCPCS | Performed by: CLINIC/CENTER

## 2020-05-19 PROCEDURE — 66821 AFTER CATARACT LASER SURGERY: CPT | Performed by: OPHTHALMOLOGY

## 2020-05-19 PROCEDURE — 66821 AFTER CATARACT LASER SURGERY: CPT | Performed by: CLINIC/CENTER

## 2020-05-19 PROCEDURE — G8907 PT DOC NO EVENTS ON DISCHARG: HCPCS | Performed by: OPHTHALMOLOGY

## 2020-06-02 ENCOUNTER — RX ONLY (RX ONLY)
Age: 75
End: 2020-06-02

## 2020-06-02 ENCOUNTER — DOCTOR'S OFFICE (OUTPATIENT)
Dept: URBAN - NONMETROPOLITAN AREA CLINIC 1 | Facility: CLINIC | Age: 75
Setting detail: OPHTHALMOLOGY
End: 2020-06-02
Payer: COMMERCIAL

## 2020-06-02 DIAGNOSIS — H01.005: ICD-10-CM

## 2020-06-02 DIAGNOSIS — H01.002: ICD-10-CM

## 2020-06-02 DIAGNOSIS — H10.503: ICD-10-CM

## 2020-06-02 DIAGNOSIS — H01.004: ICD-10-CM

## 2020-06-02 DIAGNOSIS — H01.001: ICD-10-CM

## 2020-06-02 DIAGNOSIS — H10.13: ICD-10-CM

## 2020-06-02 PROCEDURE — 99213 OFFICE O/P EST LOW 20 MIN: CPT | Performed by: OPHTHALMOLOGY

## 2020-06-02 ASSESSMENT — LID POSITION - DERMATOCHALASIS
OS_DERMATOCHALASIS: LUL 2+
OD_DERMATOCHALASIS: RUL 1+

## 2020-06-02 ASSESSMENT — REFRACTION_CURRENTRX
OD_OVR_VA: 20/
OS_ADD: +1.00
OS_SPHERE: +0.50
OD_SPHERE: +0.50
OS_CYLINDER: -2.25
OS_AXIS: 153
OD_CYLINDER: -1.75
OS_VPRISM_DIRECTION: PROGS
OD_AXIS: 5
OD_ADD: +1.00
OS_OVR_VA: 20/
OD_VPRISM_DIRECTION: PROGS

## 2020-06-02 ASSESSMENT — SPHEQUIV_DERIVED
OD_SPHEQUIV: -0.25
OS_SPHEQUIV: 0.25

## 2020-06-02 ASSESSMENT — REFRACTION_AUTOREFRACTION
OD_SPHERE: +0.25
OS_SPHERE: +0.50
OS_CYLINDER: -0.50
OS_AXIS: 074
OD_CYLINDER: -1.00
OD_AXIS: 048

## 2020-06-02 ASSESSMENT — VISUAL ACUITY
OS_BCVA: 20/25
OD_BCVA: 20/30

## 2020-06-02 ASSESSMENT — LID EXAM ASSESSMENTS
OD_BLEPHARITIS: RLL RUL T
OS_BLEPHARITIS: LLL LUL T

## 2020-06-26 ENCOUNTER — DOCTOR'S OFFICE (OUTPATIENT)
Dept: URBAN - NONMETROPOLITAN AREA CLINIC 1 | Facility: CLINIC | Age: 75
Setting detail: OPHTHALMOLOGY
End: 2020-06-26
Payer: COMMERCIAL

## 2020-06-26 DIAGNOSIS — H43.811: ICD-10-CM

## 2020-06-26 DIAGNOSIS — H01.002: ICD-10-CM

## 2020-06-26 DIAGNOSIS — H26.493: ICD-10-CM

## 2020-06-26 DIAGNOSIS — H04.223: ICD-10-CM

## 2020-06-26 DIAGNOSIS — H01.004: ICD-10-CM

## 2020-06-26 DIAGNOSIS — H01.001: ICD-10-CM

## 2020-06-26 DIAGNOSIS — H10.13: ICD-10-CM

## 2020-06-26 DIAGNOSIS — H01.005: ICD-10-CM

## 2020-06-26 PROBLEM — H10.503 BLEPHAROCONJUNCTIVITS NOS; BOTH EYES: Status: RESOLVED | Noted: 2020-02-26 | Resolved: 2020-06-26

## 2020-06-26 PROCEDURE — 99214 OFFICE O/P EST MOD 30 MIN: CPT | Performed by: OPHTHALMOLOGY

## 2020-06-26 ASSESSMENT — REFRACTION_CURRENTRX
OS_CYLINDER: -2.25
OS_AXIS: 153
OD_CYLINDER: -1.75
OS_VPRISM_DIRECTION: PROGS
OD_VPRISM_DIRECTION: PROGS
OS_OVR_VA: 20/
OS_ADD: +1.00
OD_ADD: +1.00
OD_AXIS: 5
OS_SPHERE: +0.50
OD_OVR_VA: 20/
OD_SPHERE: +0.50

## 2020-06-26 ASSESSMENT — REFRACTION_AUTOREFRACTION
OS_SPHERE: +0.50
OS_CYLINDER: -0.50
OS_AXIS: 074
OD_CYLINDER: -1.00
OD_AXIS: 048
OD_SPHERE: +0.25

## 2020-06-26 ASSESSMENT — LID EXAM ASSESSMENTS
OD_BLEPHARITIS: RLL RUL T
OS_BLEPHARITIS: LLL LUL T

## 2020-06-26 ASSESSMENT — CONFRONTATIONAL VISUAL FIELD TEST (CVF)
OD_FINDINGS: FULL
OS_FINDINGS: FULL

## 2020-06-26 ASSESSMENT — LID POSITION - DERMATOCHALASIS
OS_DERMATOCHALASIS: LUL 2+
OD_DERMATOCHALASIS: RUL 1+

## 2020-06-26 ASSESSMENT — SPHEQUIV_DERIVED
OD_SPHEQUIV: -0.25
OS_SPHEQUIV: 0.25

## 2020-06-26 ASSESSMENT — VISUAL ACUITY
OD_BCVA: 20/30
OS_BCVA: 20/25

## 2020-07-06 ENCOUNTER — DOCTOR'S OFFICE (OUTPATIENT)
Dept: URBAN - METROPOLITAN AREA CLINIC 125 | Facility: CLINIC | Age: 75
Setting detail: OPHTHALMOLOGY
End: 2020-07-06
Payer: COMMERCIAL

## 2020-07-06 DIAGNOSIS — H02.88B: ICD-10-CM

## 2020-07-06 DIAGNOSIS — H01.004: ICD-10-CM

## 2020-07-06 DIAGNOSIS — H01.001: ICD-10-CM

## 2020-07-06 DIAGNOSIS — H01.002: ICD-10-CM

## 2020-07-06 DIAGNOSIS — H02.88A: ICD-10-CM

## 2020-07-06 DIAGNOSIS — H11.429: ICD-10-CM

## 2020-07-06 DIAGNOSIS — H01.005: ICD-10-CM

## 2020-07-06 DIAGNOSIS — H04.223: ICD-10-CM

## 2020-07-06 DIAGNOSIS — H10.13: ICD-10-CM

## 2020-07-06 PROCEDURE — 92012 INTRM OPH EXAM EST PATIENT: CPT | Performed by: OPHTHALMOLOGY

## 2020-07-06 PROCEDURE — 99024 POSTOP FOLLOW-UP VISIT: CPT | Performed by: OPHTHALMOLOGY

## 2020-07-06 ASSESSMENT — REFRACTION_CURRENTRX
OS_AXIS: 153
OS_VPRISM_DIRECTION: PROGS
OD_SPHERE: +0.50
OS_OVR_VA: 20/
OS_CYLINDER: -2.25
OD_VPRISM_DIRECTION: PROGS
OS_SPHERE: +0.50
OD_AXIS: 5
OD_CYLINDER: -1.75
OD_ADD: +1.00
OS_ADD: +1.00
OD_OVR_VA: 20/

## 2020-07-06 ASSESSMENT — SPHEQUIV_DERIVED
OS_SPHEQUIV: 0.25
OD_SPHEQUIV: -0.25

## 2020-07-06 ASSESSMENT — CONFRONTATIONAL VISUAL FIELD TEST (CVF)
OD_FINDINGS: FULL
OS_FINDINGS: FULL

## 2020-07-06 ASSESSMENT — VISUAL ACUITY
OS_BCVA: 20/25
OD_BCVA: 20/20

## 2020-07-06 ASSESSMENT — LID EXAM ASSESSMENTS
OD_BLEPHARITIS: RLL RUL 1+
OD_MEIBOMITIS: RLL RUL 1+
OS_BLEPHARITIS: LLL LUL T
OS_MEIBOMITIS: LLL LUL 1+

## 2020-07-06 ASSESSMENT — REFRACTION_AUTOREFRACTION
OS_AXIS: 074
OS_CYLINDER: -0.50
OD_CYLINDER: -1.00
OD_SPHERE: +0.25
OS_SPHERE: +0.50
OD_AXIS: 048

## 2020-07-06 ASSESSMENT — LID POSITION - DERMATOCHALASIS
OS_DERMATOCHALASIS: LUL 2+
OD_DERMATOCHALASIS: RUL 1+

## 2020-07-17 ENCOUNTER — DOCTOR'S OFFICE (OUTPATIENT)
Dept: URBAN - NONMETROPOLITAN AREA CLINIC 1 | Facility: CLINIC | Age: 75
Setting detail: OPHTHALMOLOGY
End: 2020-07-17
Payer: COMMERCIAL

## 2020-07-17 ENCOUNTER — RX ONLY (RX ONLY)
Age: 75
End: 2020-07-17

## 2020-07-17 DIAGNOSIS — H43.811: ICD-10-CM

## 2020-07-17 DIAGNOSIS — H43.813: ICD-10-CM

## 2020-07-17 PROCEDURE — 92014 COMPRE OPH EXAM EST PT 1/>: CPT | Performed by: OPHTHALMOLOGY

## 2020-07-17 PROCEDURE — 92201 OPSCPY EXTND RTA DRAW UNI/BI: CPT | Performed by: OPHTHALMOLOGY

## 2020-07-17 PROCEDURE — 92134 CPTRZ OPH DX IMG PST SGM RTA: CPT | Performed by: OPHTHALMOLOGY

## 2020-07-17 ASSESSMENT — REFRACTION_CURRENTRX
OD_CYLINDER: -1.75
OS_CYLINDER: -2.25
OS_VPRISM_DIRECTION: PROGS
OS_ADD: +1.00
OS_OVR_VA: 20/
OD_OVR_VA: 20/
OD_SPHERE: +0.50
OD_AXIS: 5
OD_ADD: +1.00
OS_AXIS: 153
OS_SPHERE: +0.50
OD_VPRISM_DIRECTION: PROGS

## 2020-07-17 ASSESSMENT — VISUAL ACUITY
OS_BCVA: 20/30
OD_BCVA: 20/30+2

## 2020-07-17 ASSESSMENT — REFRACTION_AUTOREFRACTION
OD_SPHERE: +0.25
OD_AXIS: 048
OS_AXIS: 074
OS_SPHERE: +0.50
OD_CYLINDER: -1.00
OS_CYLINDER: -0.50

## 2020-07-17 ASSESSMENT — SPHEQUIV_DERIVED
OS_SPHEQUIV: 0.25
OD_SPHEQUIV: -0.25

## 2020-07-17 ASSESSMENT — LID EXAM ASSESSMENTS
OS_BLEPHARITIS: LLL LUL T
OS_MEIBOMITIS: LLL LUL 1+
OD_BLEPHARITIS: RLL RUL 1+
OD_MEIBOMITIS: RLL RUL 1+

## 2020-07-17 ASSESSMENT — LID POSITION - DERMATOCHALASIS
OS_DERMATOCHALASIS: LUL 2+
OD_DERMATOCHALASIS: RUL 1+

## 2020-07-17 ASSESSMENT — CONFRONTATIONAL VISUAL FIELD TEST (CVF)
OS_FINDINGS: FULL
OD_FINDINGS: FULL

## 2020-07-21 ENCOUNTER — AMBUL SURGICAL CARE (OUTPATIENT)
Dept: URBAN - NONMETROPOLITAN AREA SURGERY 1 | Facility: SURGERY | Age: 75
Setting detail: OPHTHALMOLOGY
End: 2020-07-21
Payer: COMMERCIAL

## 2020-07-21 DIAGNOSIS — H04.223: ICD-10-CM

## 2020-07-21 PROCEDURE — G8918 PT W/O PREOP ORDER IV AB PRO: HCPCS | Performed by: CLINIC/CENTER

## 2020-07-21 PROCEDURE — G8918 PT W/O PREOP ORDER IV AB PRO: HCPCS | Performed by: OPHTHALMOLOGY

## 2020-07-21 PROCEDURE — G8907 PT DOC NO EVENTS ON DISCHARG: HCPCS | Performed by: CLINIC/CENTER

## 2020-07-21 PROCEDURE — G8907 PT DOC NO EVENTS ON DISCHARG: HCPCS | Performed by: OPHTHALMOLOGY

## 2020-07-21 PROCEDURE — 68840 EXPLORE/IRRIGATE TEAR DUCTS: CPT | Performed by: CLINIC/CENTER

## 2020-07-21 PROCEDURE — 68840 EXPLORE/IRRIGATE TEAR DUCTS: CPT | Performed by: OPHTHALMOLOGY

## 2020-07-30 ENCOUNTER — DOCTOR'S OFFICE (OUTPATIENT)
Dept: URBAN - NONMETROPOLITAN AREA CLINIC 1 | Facility: CLINIC | Age: 75
Setting detail: OPHTHALMOLOGY
End: 2020-07-30
Payer: COMMERCIAL

## 2020-07-30 DIAGNOSIS — H04.121: ICD-10-CM

## 2020-07-30 DIAGNOSIS — H04.122: ICD-10-CM

## 2020-07-30 DIAGNOSIS — H10.13: ICD-10-CM

## 2020-07-30 DIAGNOSIS — H04.223: ICD-10-CM

## 2020-07-30 PROCEDURE — 99024 POSTOP FOLLOW-UP VISIT: CPT | Performed by: OPHTHALMOLOGY

## 2020-07-30 ASSESSMENT — VISUAL ACUITY
OD_BCVA: 20/40-1
OS_BCVA: 20/50+2

## 2020-07-30 ASSESSMENT — SUPERFICIAL PUNCTATE KERATITIS (SPK)
OD_SPK: ABSENT
OS_SPK: ABSENT

## 2020-07-30 ASSESSMENT — CONFRONTATIONAL VISUAL FIELD TEST (CVF)
OS_FINDINGS: FULL
OD_FINDINGS: FULL

## 2020-07-30 ASSESSMENT — REFRACTION_CURRENTRX
OS_CYLINDER: -2.25
OS_OVR_VA: 20/
OD_VPRISM_DIRECTION: PROGS
OD_CYLINDER: -1.75
OS_SPHERE: +0.50
OD_SPHERE: +0.50
OS_AXIS: 153
OD_OVR_VA: 20/
OS_ADD: +1.00
OD_ADD: +1.00
OS_VPRISM_DIRECTION: PROGS
OD_AXIS: 5

## 2020-07-30 ASSESSMENT — LID EXAM ASSESSMENTS
OD_MEIBOMITIS: ABSENT
OS_BLEPHARITIS: ABSENT
OS_MEIBOMITIS: ABSENT
OD_BLEPHARITIS: ABSENT

## 2020-07-30 ASSESSMENT — REFRACTION_AUTOREFRACTION
OD_AXIS: 048
OD_CYLINDER: -1.00
OS_SPHERE: +0.50
OS_AXIS: 074
OD_SPHERE: +0.25
OS_CYLINDER: -0.50

## 2020-07-30 ASSESSMENT — SPHEQUIV_DERIVED
OS_SPHEQUIV: 0.25
OD_SPHEQUIV: -0.25

## 2020-07-30 ASSESSMENT — LID POSITION - DERMATOCHALASIS
OD_DERMATOCHALASIS: RUL 1+
OS_DERMATOCHALASIS: LUL 2+

## 2020-07-31 ENCOUNTER — APPOINTMENT (OUTPATIENT)
Dept: LAB | Facility: MEDICAL CENTER | Age: 75
End: 2020-07-31
Payer: MEDICARE

## 2020-07-31 ENCOUNTER — TRANSCRIBE ORDERS (OUTPATIENT)
Dept: ADMINISTRATIVE | Facility: HOSPITAL | Age: 75
End: 2020-07-31

## 2020-07-31 DIAGNOSIS — M1A.9XX0 CHRONIC GOUT WITHOUT TOPHUS, UNSPECIFIED CAUSE, UNSPECIFIED SITE: ICD-10-CM

## 2020-07-31 DIAGNOSIS — M1A.9XX0 CHRONIC GOUT WITHOUT TOPHUS, UNSPECIFIED CAUSE, UNSPECIFIED SITE: Primary | ICD-10-CM

## 2020-07-31 LAB
ALBUMIN SERPL BCP-MCNC: 3.8 G/DL (ref 3.5–5)
ALP SERPL-CCNC: 61 U/L (ref 46–116)
ALT SERPL W P-5'-P-CCNC: 57 U/L (ref 12–78)
ANION GAP SERPL CALCULATED.3IONS-SCNC: 6 MMOL/L (ref 4–13)
AST SERPL W P-5'-P-CCNC: 29 U/L (ref 5–45)
BASOPHILS # BLD AUTO: 0.05 THOUSANDS/ΜL (ref 0–0.1)
BASOPHILS NFR BLD AUTO: 1 % (ref 0–1)
BILIRUB SERPL-MCNC: 0.32 MG/DL (ref 0.2–1)
BUN SERPL-MCNC: 14 MG/DL (ref 5–25)
CALCIUM SERPL-MCNC: 10.1 MG/DL (ref 8.3–10.1)
CHLORIDE SERPL-SCNC: 105 MMOL/L (ref 100–108)
CO2 SERPL-SCNC: 29 MMOL/L (ref 21–32)
CREAT SERPL-MCNC: 0.83 MG/DL (ref 0.6–1.3)
EOSINOPHIL # BLD AUTO: 0.29 THOUSAND/ΜL (ref 0–0.61)
EOSINOPHIL NFR BLD AUTO: 4 % (ref 0–6)
ERYTHROCYTE [DISTWIDTH] IN BLOOD BY AUTOMATED COUNT: 13.2 % (ref 11.6–15.1)
GFR SERPL CREATININE-BSD FRML MDRD: 70 ML/MIN/1.73SQ M
GLUCOSE P FAST SERPL-MCNC: 136 MG/DL (ref 65–99)
HCT VFR BLD AUTO: 44.6 % (ref 34.8–46.1)
HGB BLD-MCNC: 14.9 G/DL (ref 11.5–15.4)
IMM GRANULOCYTES # BLD AUTO: 0.01 THOUSAND/UL (ref 0–0.2)
IMM GRANULOCYTES NFR BLD AUTO: 0 % (ref 0–2)
LYMPHOCYTES # BLD AUTO: 2.13 THOUSANDS/ΜL (ref 0.6–4.47)
LYMPHOCYTES NFR BLD AUTO: 33 % (ref 14–44)
MCH RBC QN AUTO: 33 PG (ref 26.8–34.3)
MCHC RBC AUTO-ENTMCNC: 33.4 G/DL (ref 31.4–37.4)
MCV RBC AUTO: 99 FL (ref 82–98)
MONOCYTES # BLD AUTO: 0.51 THOUSAND/ΜL (ref 0.17–1.22)
MONOCYTES NFR BLD AUTO: 8 % (ref 4–12)
NEUTROPHILS # BLD AUTO: 3.57 THOUSANDS/ΜL (ref 1.85–7.62)
NEUTS SEG NFR BLD AUTO: 54 % (ref 43–75)
NRBC BLD AUTO-RTO: 0 /100 WBCS
PLATELET # BLD AUTO: 250 THOUSANDS/UL (ref 149–390)
PMV BLD AUTO: 11 FL (ref 8.9–12.7)
POTASSIUM SERPL-SCNC: 4.2 MMOL/L (ref 3.5–5.3)
PROT SERPL-MCNC: 7.5 G/DL (ref 6.4–8.2)
RBC # BLD AUTO: 4.52 MILLION/UL (ref 3.81–5.12)
SODIUM SERPL-SCNC: 140 MMOL/L (ref 136–145)
URATE SERPL-MCNC: 5.4 MG/DL (ref 2–6.8)
WBC # BLD AUTO: 6.56 THOUSAND/UL (ref 4.31–10.16)

## 2020-07-31 PROCEDURE — 80053 COMPREHEN METABOLIC PANEL: CPT

## 2020-07-31 PROCEDURE — 84550 ASSAY OF BLOOD/URIC ACID: CPT

## 2020-07-31 PROCEDURE — 85025 COMPLETE CBC W/AUTO DIFF WBC: CPT

## 2020-07-31 PROCEDURE — 36415 COLL VENOUS BLD VENIPUNCTURE: CPT

## 2020-08-11 ENCOUNTER — APPOINTMENT (OUTPATIENT)
Dept: LAB | Facility: MEDICAL CENTER | Age: 75
End: 2020-08-11
Payer: MEDICARE

## 2020-08-11 ENCOUNTER — TRANSCRIBE ORDERS (OUTPATIENT)
Dept: LAB | Facility: MEDICAL CENTER | Age: 75
End: 2020-08-11

## 2020-08-11 DIAGNOSIS — H57.89 RED EYES: Primary | ICD-10-CM

## 2020-08-11 DIAGNOSIS — Z79.4 OTHER SPECIFIED DIABETES MELLITUS WITH OTHER SPECIFIED COMPLICATION, WITH LONG-TERM CURRENT USE OF INSULIN (HCC): ICD-10-CM

## 2020-08-11 DIAGNOSIS — E13.69 OTHER SPECIFIED DIABETES MELLITUS WITH OTHER SPECIFIED COMPLICATION, WITH LONG-TERM CURRENT USE OF INSULIN (HCC): ICD-10-CM

## 2020-08-11 DIAGNOSIS — M89.9 BONE DISEASE: ICD-10-CM

## 2020-08-11 DIAGNOSIS — E03.9 HYPOTHYROIDISM, UNSPECIFIED TYPE: ICD-10-CM

## 2020-08-11 DIAGNOSIS — H57.89 RED EYES: ICD-10-CM

## 2020-08-11 DIAGNOSIS — I10 HYPERTENSION, UNSPECIFIED TYPE: ICD-10-CM

## 2020-08-11 LAB
25(OH)D3 SERPL-MCNC: 39.9 NG/ML (ref 30–100)
EST. AVERAGE GLUCOSE BLD GHB EST-MCNC: 140 MG/DL
HBA1C MFR BLD: 6.5 %
T4 FREE SERPL-MCNC: 1.03 NG/DL (ref 0.76–1.46)
TSH SERPL DL<=0.05 MIU/L-ACNC: 0.78 UIU/ML (ref 0.36–3.74)

## 2020-08-11 PROCEDURE — 84439 ASSAY OF FREE THYROXINE: CPT

## 2020-08-11 PROCEDURE — 36415 COLL VENOUS BLD VENIPUNCTURE: CPT

## 2020-08-11 PROCEDURE — 82306 VITAMIN D 25 HYDROXY: CPT

## 2020-08-11 PROCEDURE — 83036 HEMOGLOBIN GLYCOSYLATED A1C: CPT

## 2020-08-11 PROCEDURE — 84443 ASSAY THYROID STIM HORMONE: CPT

## 2020-10-13 ENCOUNTER — ANNUAL EXAM (OUTPATIENT)
Dept: GYNECOLOGY | Facility: CLINIC | Age: 75
End: 2020-10-13
Payer: MEDICARE

## 2020-10-13 VITALS
SYSTOLIC BLOOD PRESSURE: 130 MMHG | DIASTOLIC BLOOD PRESSURE: 90 MMHG | TEMPERATURE: 97.8 F | HEART RATE: 83 BPM | HEIGHT: 67 IN | WEIGHT: 227 LBS | BODY MASS INDEX: 35.63 KG/M2

## 2020-10-13 DIAGNOSIS — L90.0 LICHEN SCLEROSUS: ICD-10-CM

## 2020-10-13 DIAGNOSIS — N95.2 ATROPHIC VAGINITIS: ICD-10-CM

## 2020-10-13 DIAGNOSIS — B35.6 TINEA CRURIS: ICD-10-CM

## 2020-10-13 DIAGNOSIS — Z12.31 ENCOUNTER FOR SCREENING MAMMOGRAM FOR MALIGNANT NEOPLASM OF BREAST: Primary | ICD-10-CM

## 2020-10-13 PROCEDURE — 99214 OFFICE O/P EST MOD 30 MIN: CPT | Performed by: OBSTETRICS & GYNECOLOGY

## 2020-10-13 RX ORDER — AZITHROMYCIN MONOHYDRATE 10 MG/ML
SOLUTION/ DROPS OPHTHALMIC
COMMUNITY
Start: 2020-09-30

## 2020-10-13 RX ORDER — FLUCONAZOLE 150 MG/1
TABLET ORAL
Qty: 4 TABLET | Refills: 0 | Status: SHIPPED | OUTPATIENT
Start: 2020-10-13 | End: 2020-10-22

## 2020-10-13 RX ORDER — ESTRADIOL 0.1 MG/G
CREAM VAGINAL
Qty: 42.5 G | Refills: 3 | Status: SHIPPED | OUTPATIENT
Start: 2020-10-13 | End: 2021-06-24 | Stop reason: SDUPTHER

## 2020-11-04 ENCOUNTER — DOCTOR'S OFFICE (OUTPATIENT)
Dept: URBAN - NONMETROPOLITAN AREA CLINIC 1 | Facility: CLINIC | Age: 75
Setting detail: OPHTHALMOLOGY
End: 2020-11-04
Payer: COMMERCIAL

## 2020-11-04 ENCOUNTER — RX ONLY (RX ONLY)
Age: 75
End: 2020-11-04

## 2020-11-04 DIAGNOSIS — H35.3131: ICD-10-CM

## 2020-11-04 DIAGNOSIS — H26.491: ICD-10-CM

## 2020-11-04 DIAGNOSIS — Z96.1: ICD-10-CM

## 2020-11-04 DIAGNOSIS — H04.121: ICD-10-CM

## 2020-11-04 DIAGNOSIS — H04.223: ICD-10-CM

## 2020-11-04 DIAGNOSIS — H11.429: ICD-10-CM

## 2020-11-04 DIAGNOSIS — H04.122: ICD-10-CM

## 2020-11-04 DIAGNOSIS — H10.13: ICD-10-CM

## 2020-11-04 DIAGNOSIS — H26.492: ICD-10-CM

## 2020-11-04 PROCEDURE — 83861 MICROFLUID ANALY TEARS: CPT | Performed by: OPHTHALMOLOGY

## 2020-11-04 PROCEDURE — 92014 COMPRE OPH EXAM EST PT 1/>: CPT | Performed by: OPHTHALMOLOGY

## 2020-11-04 PROCEDURE — 92134 CPTRZ OPH DX IMG PST SGM RTA: CPT | Performed by: OPHTHALMOLOGY

## 2020-11-04 ASSESSMENT — SPHEQUIV_DERIVED
OS_SPHEQUIV: 0.25
OD_SPHEQUIV: -0.125

## 2020-11-04 ASSESSMENT — LID EXAM ASSESSMENTS
OS_MEIBOMITIS: ABSENT
OD_BLEPHARITIS: ABSENT
OD_MEIBOMITIS: ABSENT
OS_BLEPHARITIS: ABSENT

## 2020-11-04 ASSESSMENT — REFRACTION_CURRENTRX
OD_AXIS: 5
OD_VPRISM_DIRECTION: PROGS
OD_SPHERE: +0.50
OS_OVR_VA: 20/
OS_SPHERE: +0.50
OS_ADD: +1.00
OS_CYLINDER: -2.25
OD_OVR_VA: 20/
OS_VPRISM_DIRECTION: PROGS
OS_AXIS: 153
OD_CYLINDER: -1.75
OD_ADD: +1.00

## 2020-11-04 ASSESSMENT — LID POSITION - DERMATOCHALASIS
OD_DERMATOCHALASIS: RUL 1+
OS_DERMATOCHALASIS: LUL 2+

## 2020-11-04 ASSESSMENT — VISUAL ACUITY
OS_BCVA: 20/60-1
OD_BCVA: 20/25-1

## 2020-11-04 ASSESSMENT — REFRACTION_AUTOREFRACTION
OD_AXIS: 045
OS_SPHERE: +0.50
OD_CYLINDER: -1.25
OD_SPHERE: +0.50
OS_CYLINDER: -0.50
OS_AXIS: 154

## 2020-11-04 ASSESSMENT — SUPERFICIAL PUNCTATE KERATITIS (SPK)
OD_SPK: ABSENT
OS_SPK: ABSENT

## 2020-11-04 ASSESSMENT — CONFRONTATIONAL VISUAL FIELD TEST (CVF)
OS_FINDINGS: FULL
OD_FINDINGS: FULL

## 2020-11-27 DIAGNOSIS — L90.0 LICHEN SCLEROSUS: ICD-10-CM

## 2020-11-30 RX ORDER — CLOBETASOL PROPIONATE 0.5 MG/G
CREAM TOPICAL 2 TIMES DAILY
Qty: 30 G | Refills: 3 | Status: SHIPPED | OUTPATIENT
Start: 2020-11-30 | End: 2021-12-08 | Stop reason: SDUPTHER

## 2020-12-01 ENCOUNTER — AMBUL SURGICAL CARE (OUTPATIENT)
Dept: URBAN - NONMETROPOLITAN AREA SURGERY 1 | Facility: SURGERY | Age: 75
Setting detail: OPHTHALMOLOGY
End: 2020-12-01
Payer: COMMERCIAL

## 2020-12-01 DIAGNOSIS — H26.492: ICD-10-CM

## 2020-12-01 PROCEDURE — G8918 PT W/O PREOP ORDER IV AB PRO: HCPCS | Performed by: CLINIC/CENTER

## 2020-12-01 PROCEDURE — G8907 PT DOC NO EVENTS ON DISCHARG: HCPCS | Performed by: CLINIC/CENTER

## 2020-12-01 PROCEDURE — G8907 PT DOC NO EVENTS ON DISCHARG: HCPCS | Performed by: OPHTHALMOLOGY

## 2020-12-01 PROCEDURE — 66821 AFTER CATARACT LASER SURGERY: CPT | Performed by: OPHTHALMOLOGY

## 2020-12-01 PROCEDURE — 66821 AFTER CATARACT LASER SURGERY: CPT | Performed by: CLINIC/CENTER

## 2020-12-01 PROCEDURE — G8918 PT W/O PREOP ORDER IV AB PRO: HCPCS | Performed by: OPHTHALMOLOGY

## 2021-01-07 ENCOUNTER — DOCTOR'S OFFICE (OUTPATIENT)
Dept: URBAN - NONMETROPOLITAN AREA CLINIC 1 | Facility: CLINIC | Age: 76
Setting detail: OPHTHALMOLOGY
End: 2021-01-07
Payer: COMMERCIAL

## 2021-01-07 ENCOUNTER — RX ONLY (RX ONLY)
Age: 76
End: 2021-01-07

## 2021-01-07 DIAGNOSIS — H43.811: ICD-10-CM

## 2021-01-07 DIAGNOSIS — H35.3131: ICD-10-CM

## 2021-01-07 DIAGNOSIS — H43.813: ICD-10-CM

## 2021-01-07 PROCEDURE — 92134 CPTRZ OPH DX IMG PST SGM RTA: CPT | Performed by: OPHTHALMOLOGY

## 2021-01-07 PROCEDURE — 92014 COMPRE OPH EXAM EST PT 1/>: CPT | Performed by: OPHTHALMOLOGY

## 2021-01-07 PROCEDURE — 92201 OPSCPY EXTND RTA DRAW UNI/BI: CPT | Performed by: OPHTHALMOLOGY

## 2021-01-07 ASSESSMENT — REFRACTION_AUTOREFRACTION
OD_AXIS: 045
OS_SPHERE: +0.50
OS_CYLINDER: -0.50
OS_AXIS: 154
OD_CYLINDER: -1.25
OD_SPHERE: +0.50

## 2021-01-07 ASSESSMENT — LID POSITION - DERMATOCHALASIS
OS_DERMATOCHALASIS: LUL 2+
OD_DERMATOCHALASIS: RUL 1+

## 2021-01-07 ASSESSMENT — VISUAL ACUITY
OD_BCVA: 20/25-1
OS_BCVA: 20/25

## 2021-01-07 ASSESSMENT — SPHEQUIV_DERIVED
OS_SPHEQUIV: 0.25
OD_SPHEQUIV: -0.125

## 2021-01-07 ASSESSMENT — LID EXAM ASSESSMENTS
OD_BLEPHARITIS: ABSENT
OS_BLEPHARITIS: ABSENT
OD_MEIBOMITIS: ABSENT
OS_MEIBOMITIS: ABSENT

## 2021-01-07 ASSESSMENT — SUPERFICIAL PUNCTATE KERATITIS (SPK)
OS_SPK: ABSENT
OD_SPK: ABSENT

## 2021-01-07 ASSESSMENT — REFRACTION_CURRENTRX
OD_ADD: +1.00
OS_CYLINDER: -2.25
OD_CYLINDER: -1.75
OS_SPHERE: +0.50
OS_AXIS: 153
OD_VPRISM_DIRECTION: PROGS
OD_SPHERE: +0.50
OS_VPRISM_DIRECTION: PROGS
OD_OVR_VA: 20/
OS_OVR_VA: 20/
OD_AXIS: 5
OS_ADD: +1.00

## 2021-01-07 ASSESSMENT — CONFRONTATIONAL VISUAL FIELD TEST (CVF)
OD_FINDINGS: FULL
OS_FINDINGS: FULL

## 2021-01-07 ASSESSMENT — TONOMETRY
OD_IOP_MMHG: 14
OS_IOP_MMHG: 12

## 2021-01-18 ENCOUNTER — TRANSCRIBE ORDERS (OUTPATIENT)
Dept: ADMINISTRATIVE | Facility: HOSPITAL | Age: 76
End: 2021-01-18

## 2021-01-18 DIAGNOSIS — E11.69 TYPE 2 DIABETES MELLITUS WITH OTHER SPECIFIED COMPLICATION, UNSPECIFIED WHETHER LONG TERM INSULIN USE (HCC): ICD-10-CM

## 2021-01-18 DIAGNOSIS — M1A.9XX0 CHRONIC GOUT WITHOUT TOPHUS, UNSPECIFIED CAUSE, UNSPECIFIED SITE: Primary | ICD-10-CM

## 2021-01-18 DIAGNOSIS — I25.10 CVD (CARDIOVASCULAR DISEASE): ICD-10-CM

## 2021-01-20 ENCOUNTER — LAB (OUTPATIENT)
Dept: LAB | Facility: MEDICAL CENTER | Age: 76
End: 2021-01-20
Payer: MEDICARE

## 2021-01-20 DIAGNOSIS — M1A.9XX0 CHRONIC GOUT WITHOUT TOPHUS, UNSPECIFIED CAUSE, UNSPECIFIED SITE: ICD-10-CM

## 2021-01-20 DIAGNOSIS — E11.69 TYPE 2 DIABETES MELLITUS WITH OTHER SPECIFIED COMPLICATION, UNSPECIFIED WHETHER LONG TERM INSULIN USE (HCC): ICD-10-CM

## 2021-01-20 DIAGNOSIS — I25.10 CVD (CARDIOVASCULAR DISEASE): ICD-10-CM

## 2021-01-20 LAB
ALBUMIN SERPL BCP-MCNC: 3.8 G/DL (ref 3.5–5)
ALP SERPL-CCNC: 61 U/L (ref 46–116)
ALT SERPL W P-5'-P-CCNC: 66 U/L (ref 12–78)
ANION GAP SERPL CALCULATED.3IONS-SCNC: 4 MMOL/L (ref 4–13)
AST SERPL W P-5'-P-CCNC: 43 U/L (ref 5–45)
BASOPHILS # BLD AUTO: 0.06 THOUSANDS/ΜL (ref 0–0.1)
BASOPHILS NFR BLD AUTO: 1 % (ref 0–1)
BILIRUB SERPL-MCNC: 0.54 MG/DL (ref 0.2–1)
BUN SERPL-MCNC: 13 MG/DL (ref 5–25)
CALCIUM SERPL-MCNC: 9.8 MG/DL (ref 8.3–10.1)
CHLORIDE SERPL-SCNC: 104 MMOL/L (ref 100–108)
CO2 SERPL-SCNC: 31 MMOL/L (ref 21–32)
CREAT SERPL-MCNC: 0.77 MG/DL (ref 0.6–1.3)
EOSINOPHIL # BLD AUTO: 0.24 THOUSAND/ΜL (ref 0–0.61)
EOSINOPHIL NFR BLD AUTO: 4 % (ref 0–6)
ERYTHROCYTE [DISTWIDTH] IN BLOOD BY AUTOMATED COUNT: 13 % (ref 11.6–15.1)
EST. AVERAGE GLUCOSE BLD GHB EST-MCNC: 174 MG/DL
GFR SERPL CREATININE-BSD FRML MDRD: 77 ML/MIN/1.73SQ M
GLUCOSE P FAST SERPL-MCNC: 182 MG/DL (ref 65–99)
HBA1C MFR BLD: 7.7 %
HCT VFR BLD AUTO: 43.1 % (ref 34.8–46.1)
HGB BLD-MCNC: 14.3 G/DL (ref 11.5–15.4)
IMM GRANULOCYTES # BLD AUTO: 0.01 THOUSAND/UL (ref 0–0.2)
IMM GRANULOCYTES NFR BLD AUTO: 0 % (ref 0–2)
LYMPHOCYTES # BLD AUTO: 2.35 THOUSANDS/ΜL (ref 0.6–4.47)
LYMPHOCYTES NFR BLD AUTO: 34 % (ref 14–44)
MCH RBC QN AUTO: 32.3 PG (ref 26.8–34.3)
MCHC RBC AUTO-ENTMCNC: 33.2 G/DL (ref 31.4–37.4)
MCV RBC AUTO: 97 FL (ref 82–98)
MONOCYTES # BLD AUTO: 0.42 THOUSAND/ΜL (ref 0.17–1.22)
MONOCYTES NFR BLD AUTO: 6 % (ref 4–12)
NEUTROPHILS # BLD AUTO: 3.79 THOUSANDS/ΜL (ref 1.85–7.62)
NEUTS SEG NFR BLD AUTO: 55 % (ref 43–75)
NRBC BLD AUTO-RTO: 0 /100 WBCS
PLATELET # BLD AUTO: 211 THOUSANDS/UL (ref 149–390)
PMV BLD AUTO: 11 FL (ref 8.9–12.7)
POTASSIUM SERPL-SCNC: 4 MMOL/L (ref 3.5–5.3)
PROT SERPL-MCNC: 7.4 G/DL (ref 6.4–8.2)
RBC # BLD AUTO: 4.43 MILLION/UL (ref 3.81–5.12)
SODIUM SERPL-SCNC: 139 MMOL/L (ref 136–145)
URATE SERPL-MCNC: 5 MG/DL (ref 2–6.8)
WBC # BLD AUTO: 6.87 THOUSAND/UL (ref 4.31–10.16)

## 2021-01-20 PROCEDURE — 36415 COLL VENOUS BLD VENIPUNCTURE: CPT

## 2021-01-20 PROCEDURE — 83036 HEMOGLOBIN GLYCOSYLATED A1C: CPT

## 2021-01-20 PROCEDURE — 85025 COMPLETE CBC W/AUTO DIFF WBC: CPT

## 2021-01-20 PROCEDURE — 80053 COMPREHEN METABOLIC PANEL: CPT

## 2021-01-20 PROCEDURE — 84550 ASSAY OF BLOOD/URIC ACID: CPT

## 2021-01-25 ENCOUNTER — HOSPITAL ENCOUNTER (OUTPATIENT)
Dept: MAMMOGRAPHY | Facility: HOSPITAL | Age: 76
Discharge: HOME | End: 2021-01-25
Attending: OBSTETRICS & GYNECOLOGY
Payer: MEDICARE

## 2021-01-25 VITALS — BODY MASS INDEX: 35.63 KG/M2 | HEIGHT: 67 IN | WEIGHT: 227 LBS

## 2021-01-25 DIAGNOSIS — Z12.31 ENCOUNTER FOR SCREENING MAMMOGRAM FOR MALIGNANT NEOPLASM OF BREAST: ICD-10-CM

## 2021-01-25 PROCEDURE — 77063 BREAST TOMOSYNTHESIS BI: CPT

## 2021-01-25 PROCEDURE — 77067 SCR MAMMO BI INCL CAD: CPT

## 2021-01-27 ENCOUNTER — HOSPITAL ENCOUNTER (OUTPATIENT)
Dept: NON INVASIVE DIAGNOSTICS | Facility: HOSPITAL | Age: 76
Discharge: HOME | End: 2021-01-27
Attending: FAMILY MEDICINE
Payer: MEDICARE

## 2021-01-27 DIAGNOSIS — I25.10 CVD (CARDIOVASCULAR DISEASE): ICD-10-CM

## 2021-01-27 PROCEDURE — 93880 EXTRACRANIAL BILAT STUDY: CPT | Performed by: SURGERY

## 2021-01-27 PROCEDURE — 93880 EXTRACRANIAL BILAT STUDY: CPT

## 2021-03-02 ENCOUNTER — CLINICAL SUPPORT (OUTPATIENT)
Dept: NUTRITION | Facility: HOSPITAL | Age: 76
End: 2021-03-02
Payer: MEDICARE

## 2021-03-02 VITALS — BODY MASS INDEX: 35.03 KG/M2 | HEIGHT: 66 IN | WEIGHT: 218 LBS

## 2021-03-02 DIAGNOSIS — E11.65 TYPE 2 DIABETES MELLITUS WITH HYPERGLYCEMIA, WITHOUT LONG-TERM CURRENT USE OF INSULIN (HCC): Primary | ICD-10-CM

## 2021-03-02 PROCEDURE — 97802 MEDICAL NUTRITION INDIV IN: CPT

## 2021-03-02 NOTE — PROGRESS NOTES
Initial Nutrition Assessment Form    Patient Name: Farzana Simmons    YOB: 1945    Sex: Female     Assessment Date: 3/2/2021  Start Time: 9:15 Stop Time: 10:15 Total Minutes: 61     Data:  Present at session: self   Parent/Patient Concerns: Increase in A1c and wt gain during pandemic   Medical Dx/Reason for Referral: E11 67 DM with hyperglycemia   Past Medical History:   Diagnosis Date    Allergic rhinitis     Anxiety     Disease of thyroid gland     Dry eye     Hypertension        Current Outpatient Medications   Medication Sig Dispense Refill    allopurinol (ZYLOPRIM) 100 mg tablet       ascorbic acid (VITAMIN C) 500 mg tablet Take 500 mg by mouth daily      AzaSite 1 % ophthalmic solution       Calcium-Phosphorus-Vitamin D (CITRACAL +D3 PO) Take by mouth      cholecalciferol (VITAMIN D3) 1,000 units tablet Take 2,000 Units by mouth daily      citalopram (CeleXA) 10 mg tablet Take 10 mg by mouth  3    clobetasol (TEMOVATE) 0 05 % cream Apply topically 2 (two) times a day for 14 days 30 g 3    cycloSPORINE (RESTASIS) 0 05 % ophthalmic emulsion 1 drop 2 (two) times a day      estradiol (ESTRACE) 0 1 mg/g vaginal cream 1 gram vaginally M,W,F x 3 weeks then once weekly 42 5 g 3    fexofenadine (ALLEGRA) 180 MG tablet Take 180 mg by mouth daily      fluticasone (FLONASE) 50 mcg/act nasal spray   2    levothyroxine 75 mcg tablet       lisinopril-hydrochlorothiazide (PRINZIDE,ZESTORETIC) 10-12 5 MG per tablet       mometasone (ELOCON) 0 1 % cream Apply topically daily Apply topically BID x 2 weeks then qhs prn 45 g 2    Multiple Vitamins-Minerals (ONE-A-DAY WOMENS PETITES PO) Take by mouth      neomycin-polymyxin-hydrocortisone (CORTISPORIN) 0 35%-10,000 units/mL-1% otic suspension Administer into ears      Omega-3 Fatty Acids (FISH OIL) 645 MG CAPS Take by mouth      Pazeo 0 7 % SOLN       prednisoLONE acetate (PRED FORTE) 1 % ophthalmic suspension       Probiotic Product (PROBIOTIC DAILY PO) Take by mouth      SUMAtriptan (IMITREX) 50 mg tablet Take by mouth      Turmeric Curcumin 500 MG CAPS Take by mouth      White Petrolatum-Mineral Oil (REFRESH LACRI-LUBE OP) Apply to eye       No current facility-administered medications for this visit  Additional Meds/Supplements: Started on metformin but did not tolerate; changed to Metformin ER and tolerating better   Special Learning Needs: none   Height: HC Readings from Last 3 Encounters:   No data found for [facility]       Weight: Wt Readings from Last 10 Encounters:   03/02/21 98 9 kg (218 lb)   01/25/21 103 kg (227 lb)   10/13/20 103 kg (227 lb)   09/10/20 103 kg (228 lb)   08/13/20 104 kg (229 lb)   03/09/20 101 kg (223 lb 5 2 oz)   12/02/19 98 9 kg (218 lb)   10/09/19 99 3 kg (219 lb)   07/11/19 99 3 kg (219 lb)   03/26/19 100 kg (221 lb)     Estimated body mass index is 34 66 kg/m² as calculated from the following:    Height as of 1/25/21: 5' 6 5" (1 689 m)  Weight as of this encounter: 98 9 kg (218 lb)  Recent Weight Change: [x]Yes     []No  Amount: Since patient aware of increase in A1c, changed diet and lost 7#      Energy Needs: 1770 calories/~225 g carbs daily (30 zulema/kg IBW)   Allergies   Allergen Reactions    Gluten Meal     Diclofenac Rash    Penicillins Rash       Social History     Substance and Sexual Activity   Alcohol Use No    Comment: rarely       Social History     Tobacco Use   Smoking Status Never Smoker   Smokeless Tobacco Never Used       Who shops? patient   Who cooks? patient and spouse   Exercise: Was a member of the [email or web address] and attended water aerobics but since [employer] closed; recently started walking every other day about 1 month ago   Prior Counseling? [x]Yes     []No  When: 2013     Why: diabetes        Diet Hx:  Breakfast: 8 am 2 gluten free slices bread; 1 egg with spinach, onions, cheese, hot green tea   Lunch: 1-2 p m     Main meal; meat, potatoes or rice, gluten free pasta, vegetable,  Diluted green tea Dinner: 6 p m  Something light; sandwich, soup, fruit, etc       Snacks: Patient reports giving up snacking for most part        Nutrition Diagnosis:   Excess carbohydrate intake  related to Physiological causes requiring modified carbohydrate intake (i e  diabetes mellitus) as  evidenced by Hemoglobin A1C >6%       Medical Nutrition Therapy Intervention:  [x]Individualized Meal Plan:  Carb counting reviewed and reinforced [x]Understanding Lab Values   [x]Basic Pathophysiology of Disease []Food/Medication Interactions   [x]Food Diary [x]Exercise   []Lifestyle/Behavior Modification Techniques []Medication, Mechanism of Action   [x]Label Reading [x]Self Blood Glucose Monitoring   [x]Weight/BMI Goals:  Patient would like to lose more weight [x]Other - patient has made changes with her diet since finding out her A1c increased; more portion controlled  She started walking for exercise  She has seen improved blood sugar numbers  Discussed elevated fasting and patient may benefit from small snack at HS   Other Notes:        Comprehension: []Excellent  []Very Good  [x]Good  []Fair   []Poor    Receptivity: []Excellent  []Very Good  [x]Good  []Fair   []Poor    Expected Compliance: []Excellent  []Very Good  [x]Good  []Fair   []Poor        Goals:  1  Limit carb intake < 225 g daily   2  Take HS snack with <30 g carbs/protein   3  Weight loss   5 to 1 # weekly until goal weight achieved       Follow up April 7, 2021  Labs:  [facility]  Lab Results   Component Value Date    K 4 0 01/20/2021     01/20/2021    CO2 31 01/20/2021    BUN 13 01/20/2021    CREATININE 0 77 01/20/2021    GLUF 182 (H) 01/20/2021    CALCIUM 9 8 01/20/2021    AST 43 01/20/2021    ALT 66 01/20/2021    ALKPHOS 61 01/20/2021    EGFR 77 01/20/2021       BMP  Lab Results   Component Value Date    CALCIUM 9 8 01/20/2021    K 4 0 01/20/2021    CO2 31 01/20/2021     01/20/2021    BUN 13 01/20/2021    CREATININE 0 77 01/20/2021       Lipids  No results found for: CHOL  Lab Results   Component Value Date    HDL 35 (L) 01/08/2020    HDL 35 (L) 01/11/2019    HDL 35 (L) 04/03/2018     Lab Results   Component Value Date    LDLCALC 105 (H) 01/08/2020    LDLCALC 103 (H) 01/11/2019    LDLCALC 94 04/03/2018     Lab Results   Component Value Date    TRIG 158 (H) 01/08/2020    TRIG 152 (H) 01/11/2019    TRIG 125 04/03/2018     No results found for: CHOLHDL    Hemoglobin A1C  Lab Results   Component Value Date    HGBA1C 7 7 (H) 01/20/2021       Fasting Glucose  Lab Results   Component Value Date    GLUF 182 (H) 01/20/2021       Insulin     Thyroid  No results found for: TSH, K8YOPJR, H4LYJSW, THYROIDAB    Hepatic Function Panel  Lab Results   Component Value Date    ALT 66 01/20/2021    AST 43 01/20/2021    ALKPHOS 61 01/20/2021       Celiac Disease Antibody Panel  Endomysial IgA   Date Value Ref Range Status   10/01/2019 Negative Negative Final     Gliadin IgA   Date Value Ref Range Status   10/01/2019 13 0 - 19 units Final     Comment:                        Negative                   0 - 19                     Weak Positive             20 - 30                     Moderate to Strong Positive   >30     Gliadin IgG   Date Value Ref Range Status   10/01/2019 3 0 - 19 units Final     Comment:                        Negative                   0 - 19                     Weak Positive             20 - 30                     Moderate to Strong Positive   >30     IgA   Date Value Ref Range Status   10/01/2019 218 64 - 422 mg/dL Final     TISSUE TRANSGLUTAMINASE IGA   Date Value Ref Range Status   10/01/2019 2 0 - 3 U/mL Final     Comment:                                   Negative        0 -  3                                Weak Positive   4 - 10                                Positive           >10   Tissue Transglutaminase (tTG) has been identified   as the endomysial antigen    Studies have demonstr-   ated that endomysial IgA antibodies have over 99%   specificity for gluten sensitive enteropathy        Iron  No results found for: IRON, TIBC, FERRITIN    Vitamins  No results found for: VITAMIN B2   No results found for: NICOTINAMIDE, NICOTINIC ACID   No results found for: VITAMINB6  No results found for: [id number]  No results found for: VITB5  No results found for: G6NSPALW  No results found for: THYROGLB  No results found for: VITAMIN K   No results found for: 25-HYDROXY VIT D   No components found for: [address] [facility] MA,RD,LDN,[facility]  [address] [facility]   [address] [facility] [address]

## 2021-04-06 ENCOUNTER — TELEPHONE (OUTPATIENT)
Dept: NUTRITION | Facility: HOSPITAL | Age: 76
End: 2021-04-06

## 2021-04-06 NOTE — TELEPHONE ENCOUNTER
Carlton Devin cancelled her MNT appointment for 4/7/2021  VM left regarding rescheduling appointment  Await return phone call

## 2021-04-08 ENCOUNTER — TELEPHONE (OUTPATIENT)
Dept: NUTRITION | Facility: HOSPITAL | Age: 76
End: 2021-04-08

## 2021-04-14 ENCOUNTER — CLINICAL SUPPORT (OUTPATIENT)
Dept: NUTRITION | Facility: HOSPITAL | Age: 76
End: 2021-04-14
Payer: MEDICARE

## 2021-04-14 DIAGNOSIS — E11.65 TYPE 2 DIABETES MELLITUS WITH HYPERGLYCEMIA, WITHOUT LONG-TERM CURRENT USE OF INSULIN (HCC): Primary | ICD-10-CM

## 2021-04-14 PROCEDURE — 97803 MED NUTRITION INDIV SUBSEQ: CPT

## 2021-04-15 VITALS — WEIGHT: 216 LBS | BODY MASS INDEX: 34.86 KG/M2

## 2021-04-15 NOTE — PROGRESS NOTES
Follow-Up Nutrition Assessment Form    Patient Name: Floresita Gaxiola    YOB: 1945    Sex: Female      Follow Up Date: 4/14/2021 Start Time: 11:00 Stop Time: 11:45 Total Minutes: 39     Data:  Present at session: self   Parent/Patient Concerns: Elevated fasting blood sugars   Medical Dx/Reason for Referral: E11 67, Type 2 DM with hyperglycemia   Past Medical History:   Diagnosis Date    Allergic rhinitis     Anxiety     Disease of thyroid gland     Dry eye     Hypertension        Current Outpatient Medications   Medication Sig Dispense Refill    allopurinol (ZYLOPRIM) 100 mg tablet       ascorbic acid (VITAMIN C) 500 mg tablet Take 500 mg by mouth daily      AzaSite 1 % ophthalmic solution       Calcium-Phosphorus-Vitamin D (CITRACAL +D3 PO) Take by mouth      cholecalciferol (VITAMIN D3) 1,000 units tablet Take 2,000 Units by mouth daily      citalopram (CeleXA) 10 mg tablet Take 10 mg by mouth  3    clobetasol (TEMOVATE) 0 05 % cream Apply topically 2 (two) times a day for 14 days 30 g 3    cycloSPORINE (RESTASIS) 0 05 % ophthalmic emulsion 1 drop 2 (two) times a day      estradiol (ESTRACE) 0 1 mg/g vaginal cream 1 gram vaginally M,W,F x 3 weeks then once weekly 42 5 g 3    fexofenadine (ALLEGRA) 180 MG tablet Take 180 mg by mouth daily      fluticasone (FLONASE) 50 mcg/act nasal spray   2    levothyroxine 75 mcg tablet       lisinopril-hydrochlorothiazide (PRINZIDE,ZESTORETIC) 10-12 5 MG per tablet       mometasone (ELOCON) 0 1 % cream Apply topically daily Apply topically BID x 2 weeks then qhs prn 45 g 2    Multiple Vitamins-Minerals (ONE-A-DAY WOMENS PETITES PO) Take by mouth      neomycin-polymyxin-hydrocortisone (CORTISPORIN) 0 35%-10,000 units/mL-1% otic suspension Administer into ears      Omega-3 Fatty Acids (FISH OIL) 645 MG CAPS Take by mouth      Pazeo 0 7 % SOLN       prednisoLONE acetate (PRED FORTE) 1 % ophthalmic suspension       Probiotic Product (PROBIOTIC DAILY PO) Take by mouth      SUMAtriptan (IMITREX) 50 mg tablet Take by mouth      Turmeric Curcumin 500 MG CAPS Take by mouth      White Petrolatum-Mineral Oil (REFRESH LACRI-LUBE OP) Apply to eye       No current facility-administered medications for this visit  Additional Meds/Supplements: meds reviewed   Barriers to Learning: None   Labs: Reviewed SMBG logs   Height: Ht Readings from Last 3 Encounters:   03/02/21 5' 6" (1 676 m)   01/25/21 5' 6 5" (1 689 m)   10/13/20 5' 6 5" (1 689 m)      Weight: Wt Readings from Last 10 Encounters:   03/02/21 98 9 kg (218 lb)   01/25/21 103 kg (227 lb)   10/13/20 103 kg (227 lb)   09/10/20 103 kg (228 lb)   08/13/20 104 kg (229 lb)   03/09/20 101 kg (223 lb 5 2 oz)   12/02/19 98 9 kg (218 lb)   10/09/19 99 3 kg (219 lb)   07/11/19 99 3 kg (219 lb)   03/26/19 100 kg (221 lb)     Estimated body mass index is 35 19 kg/m² as calculated from the following:    Height as of 3/2/21: 5' 6" (1 676 m)  Weight as of 3/2/21: 98 9 kg (218 lb)  Wt  Change Since Last Visit: [x]Yes     []No  Amount: Patient has experienced an 11# wt loss since changing her diet      Energy Needs: No calculations performed for this visit   Pain Screen: Are you having pain now? No      Goals Achieved:  1  Carb counting   2  Consuming HS snack   3  Wt loss     New Goals:   1  Decrease carbs as HS to 15 grams with protein included; suggestions provided   2  Increase exercise to 150 minutes/week   3  Initial PES: Excess carbohydrate intake  related to Physiological causes requiring modified carbohydrate intake (i e  diabetes mellitus) as  evidenced by Hemoglobin A1C >6%      New PES: No Change      New Problem List:  1  Fasting blood sugars improved but still above goal   2    3        Assessment:  Patient seen for follow up visit regarding diabetes  She states she feels "encouraged" as she is experiencing wt loss and improved blood sugars    Reviewed her SMBG logs and her overall blood sugar control is improved but she is still experiencing elevated fasting blood sugars  She is consuming snack as recommended  Discussed decreasing carb count at HS and make sure protein source included in snack  Patient continues to walk several times weekly  Encouraged her to increase her exercise to 150 minutes weekly  Reviewed with patient paired blood sugars testing to understand how food effects her blood sugars  Medical Nutrition Therapy Intervention:  []Individualized Meal Plan []Understanding Lab Values   []Basic Pathophysiology of Disease []Food/Medication Interactions   [x]Food Diary [x]Exercise   [x]Lifestyle/Behavior Modification Techniques []Medication, Mechanism of Action   []Label Reading [x]Self Blood Glucose Monitoring   [x]Weight/BMI Goals [x]Other - Provided information on DSME classes       Other Notes:        Comprehension: []Excellent  []Very Good  [x]Good  []Fair   []Poor    Receptivity: []Excellent  []Very Good  [x]Good  []Fair   []Poor    Expected Compliance: []Excellent  []Very Good  [x]Good  []Fair   []Poor      Labs:  CMP  Lab Results   Component Value Date    K 4 0 01/20/2021     01/20/2021    CO2 31 01/20/2021    BUN 13 01/20/2021    CREATININE 0 77 01/20/2021    GLUF 182 (H) 01/20/2021    CALCIUM 9 8 01/20/2021    AST 43 01/20/2021    ALT 66 01/20/2021    ALKPHOS 61 01/20/2021    EGFR 77 01/20/2021       BMP  Lab Results   Component Value Date    CALCIUM 9 8 01/20/2021    K 4 0 01/20/2021    CO2 31 01/20/2021     01/20/2021    BUN 13 01/20/2021    CREATININE 0 77 01/20/2021       Lipids  No results found for: CHOL  Lab Results   Component Value Date    HDL 35 (L) 01/08/2020    HDL 35 (L) 01/11/2019    HDL 35 (L) 04/03/2018     Lab Results   Component Value Date    LDLCALC 105 (H) 01/08/2020    LDLCALC 103 (H) 01/11/2019    LDLCALC 94 04/03/2018     Lab Results   Component Value Date    TRIG 158 (H) 01/08/2020    TRIG 152 (H) 01/11/2019    TRIG 125 04/03/2018 No results found for: CHOLHDL    Hemoglobin A1C  Lab Results   Component Value Date    HGBA1C 7 7 (H) 01/20/2021       Fasting Glucose  Lab Results   Component Value Date    GLUF 182 (H) 01/20/2021       Insulin     Thyroid  No results found for: TSH, S6RDNAN, B1RGEGA, THYROIDAB    Hepatic Function Panel  Lab Results   Component Value Date    ALT 66 01/20/2021    AST 43 01/20/2021    ALKPHOS 61 01/20/2021       Celiac Disease Antibody Panel  Endomysial IgA   Date Value Ref Range Status   10/01/2019 Negative Negative Final     Gliadin IgA   Date Value Ref Range Status   10/01/2019 13 0 - 19 units Final     Comment:                        Negative                   0 - 19                     Weak Positive             20 - 30                     Moderate to Strong Positive   >30     Gliadin IgG   Date Value Ref Range Status   10/01/2019 3 0 - 19 units Final     Comment:                        Negative                   0 - 19                     Weak Positive             20 - 30                     Moderate to Strong Positive   >30     IgA   Date Value Ref Range Status   10/01/2019 218 64 - 422 mg/dL Final     TISSUE TRANSGLUTAMINASE IGA   Date Value Ref Range Status   10/01/2019 2 0 - 3 U/mL Final     Comment:                                   Negative        0 -  3                                Weak Positive   4 - 10                                Positive           >10   Tissue Transglutaminase (tTG) has been identified   as the endomysial antigen  Studies have demonstr-   ated that endomysial IgA antibodies have over 99%   specificity for gluten sensitive enteropathy        Iron  No results found for: IRON, TIBC, FERRITIN    Vitamins  No results found for: VITAMIN B2   No results found for: NICOTINAMIDE, NICOTINIC ACID   No results found for: VITAMINB6  No results found for: [id number]  No results found for: VITB5  No results found for: [id number]  No results found for: THYROGLB  No results found for: VITAMIN K No results found for: 25-HYDROXY VIT D   No components found for: VITAMINE     Patient did not feel a follow up appointment is necessary at this time as she is doing well  She has RD information to schedule PRN  Await decision on DSME classes       Gracie Pa MA,RD,LDN,[facility]  [address] [facility]   [address] [facility] [id number]

## 2021-05-06 ENCOUNTER — DOCTOR'S OFFICE (OUTPATIENT)
Dept: URBAN - NONMETROPOLITAN AREA CLINIC 1 | Facility: CLINIC | Age: 76
Setting detail: OPHTHALMOLOGY
End: 2021-05-06
Payer: COMMERCIAL

## 2021-05-06 ENCOUNTER — RX ONLY (RX ONLY)
Age: 76
End: 2021-05-06

## 2021-05-06 DIAGNOSIS — H43.813: ICD-10-CM

## 2021-05-06 DIAGNOSIS — H04.122: ICD-10-CM

## 2021-05-06 DIAGNOSIS — H04.121: ICD-10-CM

## 2021-05-06 DIAGNOSIS — H04.223: ICD-10-CM

## 2021-05-06 DIAGNOSIS — H35.3131: ICD-10-CM

## 2021-05-06 DIAGNOSIS — E11.9: ICD-10-CM

## 2021-05-06 DIAGNOSIS — H43.811: ICD-10-CM

## 2021-05-06 PROCEDURE — 92014 COMPRE OPH EXAM EST PT 1/>: CPT | Performed by: OPHTHALMOLOGY

## 2021-05-06 PROCEDURE — 83861 MICROFLUID ANALY TEARS: CPT | Performed by: OPHTHALMOLOGY

## 2021-05-06 PROCEDURE — 92134 CPTRZ OPH DX IMG PST SGM RTA: CPT | Performed by: OPHTHALMOLOGY

## 2021-05-06 PROCEDURE — 92201 OPSCPY EXTND RTA DRAW UNI/BI: CPT | Performed by: OPHTHALMOLOGY

## 2021-05-06 ASSESSMENT — LID EXAM ASSESSMENTS
OS_BLEPHARITIS: ABSENT
OS_MEIBOMITIS: ABSENT
OD_BLEPHARITIS: ABSENT
OD_MEIBOMITIS: ABSENT

## 2021-05-06 ASSESSMENT — VISUAL ACUITY
OD_BCVA: 20/25-1
OS_BCVA: 20/25

## 2021-05-06 ASSESSMENT — SPHEQUIV_DERIVED
OS_SPHEQUIV: 0.25
OD_SPHEQUIV: -0.125

## 2021-05-06 ASSESSMENT — LID POSITION - DERMATOCHALASIS
OS_DERMATOCHALASIS: LUL 2+
OD_DERMATOCHALASIS: RUL 1+

## 2021-05-06 ASSESSMENT — REFRACTION_CURRENTRX
OD_VPRISM_DIRECTION: PROGS
OS_AXIS: 153
OS_CYLINDER: -2.25
OD_AXIS: 5
OD_ADD: +1.00
OD_OVR_VA: 20/
OS_VPRISM_DIRECTION: PROGS
OS_OVR_VA: 20/
OD_CYLINDER: -1.75
OD_SPHERE: +0.50
OS_SPHERE: +0.50
OS_ADD: +1.00

## 2021-05-06 ASSESSMENT — CONFRONTATIONAL VISUAL FIELD TEST (CVF)
OS_FINDINGS: FULL
OD_FINDINGS: FULL

## 2021-05-06 ASSESSMENT — REFRACTION_AUTOREFRACTION
OD_AXIS: 045
OS_SPHERE: +0.50
OD_SPHERE: +0.50
OS_CYLINDER: -0.50
OD_CYLINDER: -1.25
OS_AXIS: 154

## 2021-05-06 ASSESSMENT — SUPERFICIAL PUNCTATE KERATITIS (SPK)
OD_SPK: ABSENT
OS_SPK: ABSENT

## 2021-05-18 ENCOUNTER — DOCTOR'S OFFICE (OUTPATIENT)
Dept: URBAN - NONMETROPOLITAN AREA CLINIC 1 | Facility: CLINIC | Age: 76
Setting detail: OPHTHALMOLOGY
End: 2021-05-18
Payer: COMMERCIAL

## 2021-05-18 DIAGNOSIS — H26.492: ICD-10-CM

## 2021-05-18 DIAGNOSIS — Z96.1: ICD-10-CM

## 2021-05-18 DIAGNOSIS — H26.491: ICD-10-CM

## 2021-05-18 DIAGNOSIS — H04.122: ICD-10-CM

## 2021-05-18 DIAGNOSIS — H04.121: ICD-10-CM

## 2021-05-18 DIAGNOSIS — H01.002: ICD-10-CM

## 2021-05-18 DIAGNOSIS — H01.001: ICD-10-CM

## 2021-05-18 DIAGNOSIS — H01.004: ICD-10-CM

## 2021-05-18 DIAGNOSIS — H01.005: ICD-10-CM

## 2021-05-18 DIAGNOSIS — H43.811: ICD-10-CM

## 2021-05-18 DIAGNOSIS — H43.813: ICD-10-CM

## 2021-05-18 DIAGNOSIS — H35.3131: ICD-10-CM

## 2021-05-18 PROBLEM — H04.223: Status: RESOLVED | Noted: 2020-05-08 | Resolved: 2021-05-18

## 2021-05-18 PROBLEM — H10.13: Status: RESOLVED | Noted: 2020-03-04 | Resolved: 2021-05-18

## 2021-05-18 PROBLEM — H11.429 CHEMOSIS: Status: RESOLVED | Noted: 2020-07-06 | Resolved: 2021-05-18

## 2021-05-18 PROBLEM — H02.834 DERMATOCHALASIS; RIGHT UPPER LID, LEFT UPPER LID: Status: ACTIVE | Noted: 2019-12-03

## 2021-05-18 PROBLEM — E11.9 DIABETES TYPE 2 NO RETINOPATHY: Status: ACTIVE | Noted: 2021-05-06

## 2021-05-18 PROBLEM — H02.88B MEIBOMIAN GLAND DYSFUNCTION LEFT EYE, UPPER AND LOWER EYELIDS: Status: ACTIVE | Noted: 2020-07-06

## 2021-05-18 PROBLEM — H02.831 DERMATOCHALASIS; RIGHT UPPER LID, LEFT UPPER LID: Status: ACTIVE | Noted: 2019-12-03

## 2021-05-18 PROBLEM — H02.88A MEIBOMIAN GLAND DYSFUNCTION RIGHT EYE, UPPER AND LOWER EYELIDS: Status: ACTIVE | Noted: 2020-07-06

## 2021-05-18 PROCEDURE — 92012 INTRM OPH EXAM EST PATIENT: CPT | Performed by: OPHTHALMOLOGY

## 2021-05-18 PROCEDURE — 83861 MICROFLUID ANALY TEARS: CPT | Performed by: OPHTHALMOLOGY

## 2021-05-18 ASSESSMENT — REFRACTION_CURRENTRX
OS_SPHERE: +0.50
OD_ADD: +1.00
OS_CYLINDER: -2.25
OS_OVR_VA: 20/
OS_AXIS: 153
OS_VPRISM_DIRECTION: PROGS
OD_SPHERE: +0.50
OD_CYLINDER: -1.75
OD_AXIS: 5
OD_VPRISM_DIRECTION: PROGS
OS_ADD: +1.00
OD_OVR_VA: 20/

## 2021-05-18 ASSESSMENT — REFRACTION_AUTOREFRACTION
OS_AXIS: 154
OD_CYLINDER: -1.25
OD_SPHERE: +0.50
OS_CYLINDER: -0.50
OS_SPHERE: +0.50
OD_AXIS: 045

## 2021-05-18 ASSESSMENT — LID POSITION - DERMATOCHALASIS
OD_DERMATOCHALASIS: RUL 1+
OS_DERMATOCHALASIS: LUL 2+

## 2021-05-18 ASSESSMENT — LID EXAM ASSESSMENTS
OS_MEIBOMITIS: ABSENT
OS_BLEPHARITIS: ABSENT
OD_MEIBOMITIS: ABSENT
OD_BLEPHARITIS: ABSENT

## 2021-05-18 ASSESSMENT — SUPERFICIAL PUNCTATE KERATITIS (SPK)
OS_SPK: ABSENT
OD_SPK: ABSENT

## 2021-05-18 ASSESSMENT — SPHEQUIV_DERIVED
OD_SPHEQUIV: -0.125
OS_SPHEQUIV: 0.25

## 2021-05-18 ASSESSMENT — VISUAL ACUITY
OS_BCVA: 20/20-2
OD_BCVA: 20/20-1

## 2021-05-18 ASSESSMENT — CONFRONTATIONAL VISUAL FIELD TEST (CVF)
OS_FINDINGS: FULL
OD_FINDINGS: FULL

## 2021-05-19 ENCOUNTER — TRANSCRIBE ORDERS (OUTPATIENT)
Dept: LAB | Facility: MEDICAL CENTER | Age: 76
End: 2021-05-19

## 2021-05-19 ENCOUNTER — APPOINTMENT (OUTPATIENT)
Dept: LAB | Facility: MEDICAL CENTER | Age: 76
End: 2021-05-19
Payer: MEDICARE

## 2021-05-19 DIAGNOSIS — E11.9 TYPE 2 DIABETES MELLITUS WITHOUT COMPLICATION, UNSPECIFIED WHETHER LONG TERM INSULIN USE (HCC): ICD-10-CM

## 2021-05-19 DIAGNOSIS — E11.9 TYPE 2 DIABETES MELLITUS WITHOUT COMPLICATION, UNSPECIFIED WHETHER LONG TERM INSULIN USE (HCC): Primary | ICD-10-CM

## 2021-05-19 LAB
CREAT UR-MCNC: 78.7 MG/DL
EST. AVERAGE GLUCOSE BLD GHB EST-MCNC: 131 MG/DL
HBA1C MFR BLD: 6.2 %
MICROALBUMIN UR-MCNC: 20.8 MG/L (ref 0–20)
MICROALBUMIN/CREAT 24H UR: 26 MG/G CREATININE (ref 0–30)

## 2021-05-19 PROCEDURE — 82043 UR ALBUMIN QUANTITATIVE: CPT

## 2021-05-19 PROCEDURE — 83036 HEMOGLOBIN GLYCOSYLATED A1C: CPT

## 2021-05-19 PROCEDURE — 36415 COLL VENOUS BLD VENIPUNCTURE: CPT

## 2021-05-19 PROCEDURE — 82570 ASSAY OF URINE CREATININE: CPT

## 2021-06-24 DIAGNOSIS — N95.2 ATROPHIC VAGINITIS: ICD-10-CM

## 2021-06-24 RX ORDER — ESTRADIOL 0.1 MG/G
CREAM VAGINAL
Qty: 42.5 G | Refills: 3 | Status: SHIPPED | OUTPATIENT
Start: 2021-06-24 | End: 2022-07-05 | Stop reason: SDUPTHER

## 2021-07-30 ENCOUNTER — APPOINTMENT (OUTPATIENT)
Dept: LAB | Facility: MEDICAL CENTER | Age: 76
End: 2021-07-30
Payer: MEDICARE

## 2021-07-30 DIAGNOSIS — M1A.00X0 IDIOPATHIC CHRONIC GOUT WITHOUT TOPHUS, UNSPECIFIED SITE: ICD-10-CM

## 2021-07-30 LAB
ALBUMIN SERPL BCP-MCNC: 3.5 G/DL (ref 3.5–5)
ALP SERPL-CCNC: 44 U/L (ref 46–116)
ALT SERPL W P-5'-P-CCNC: 36 U/L (ref 12–78)
ANION GAP SERPL CALCULATED.3IONS-SCNC: 3 MMOL/L (ref 4–13)
AST SERPL W P-5'-P-CCNC: 20 U/L (ref 5–45)
BASOPHILS # BLD AUTO: 0.05 THOUSANDS/ΜL (ref 0–0.1)
BASOPHILS NFR BLD AUTO: 1 % (ref 0–1)
BILIRUB SERPL-MCNC: 0.39 MG/DL (ref 0.2–1)
BUN SERPL-MCNC: 17 MG/DL (ref 5–25)
CALCIUM SERPL-MCNC: 9.1 MG/DL (ref 8.3–10.1)
CHLORIDE SERPL-SCNC: 106 MMOL/L (ref 100–108)
CO2 SERPL-SCNC: 29 MMOL/L (ref 21–32)
CREAT SERPL-MCNC: 0.72 MG/DL (ref 0.6–1.3)
EOSINOPHIL # BLD AUTO: 0.29 THOUSAND/ΜL (ref 0–0.61)
EOSINOPHIL NFR BLD AUTO: 4 % (ref 0–6)
ERYTHROCYTE [DISTWIDTH] IN BLOOD BY AUTOMATED COUNT: 13.5 % (ref 11.6–15.1)
GFR SERPL CREATININE-BSD FRML MDRD: 83 ML/MIN/1.73SQ M
GLUCOSE P FAST SERPL-MCNC: 130 MG/DL (ref 65–99)
HCT VFR BLD AUTO: 41.9 % (ref 34.8–46.1)
HGB BLD-MCNC: 14 G/DL (ref 11.5–15.4)
IMM GRANULOCYTES # BLD AUTO: 0.03 THOUSAND/UL (ref 0–0.2)
IMM GRANULOCYTES NFR BLD AUTO: 0 % (ref 0–2)
LYMPHOCYTES # BLD AUTO: 2.51 THOUSANDS/ΜL (ref 0.6–4.47)
LYMPHOCYTES NFR BLD AUTO: 37 % (ref 14–44)
MCH RBC QN AUTO: 32.3 PG (ref 26.8–34.3)
MCHC RBC AUTO-ENTMCNC: 33.4 G/DL (ref 31.4–37.4)
MCV RBC AUTO: 97 FL (ref 82–98)
MONOCYTES # BLD AUTO: 0.42 THOUSAND/ΜL (ref 0.17–1.22)
MONOCYTES NFR BLD AUTO: 6 % (ref 4–12)
NEUTROPHILS # BLD AUTO: 3.5 THOUSANDS/ΜL (ref 1.85–7.62)
NEUTS SEG NFR BLD AUTO: 52 % (ref 43–75)
NRBC BLD AUTO-RTO: 0 /100 WBCS
PLATELET # BLD AUTO: 213 THOUSANDS/UL (ref 149–390)
PMV BLD AUTO: 10.9 FL (ref 8.9–12.7)
POTASSIUM SERPL-SCNC: 4.1 MMOL/L (ref 3.5–5.3)
PROT SERPL-MCNC: 7.3 G/DL (ref 6.4–8.2)
RBC # BLD AUTO: 4.33 MILLION/UL (ref 3.81–5.12)
SODIUM SERPL-SCNC: 138 MMOL/L (ref 136–145)
URATE SERPL-MCNC: 5.1 MG/DL (ref 2–6.8)
WBC # BLD AUTO: 6.8 THOUSAND/UL (ref 4.31–10.16)

## 2021-07-30 PROCEDURE — 80053 COMPREHEN METABOLIC PANEL: CPT

## 2021-07-30 PROCEDURE — 84550 ASSAY OF BLOOD/URIC ACID: CPT

## 2021-07-30 PROCEDURE — 36415 COLL VENOUS BLD VENIPUNCTURE: CPT

## 2021-07-30 PROCEDURE — 85025 COMPLETE CBC W/AUTO DIFF WBC: CPT

## 2021-08-27 ENCOUNTER — APPOINTMENT (OUTPATIENT)
Dept: LAB | Facility: MEDICAL CENTER | Age: 76
End: 2021-08-27
Payer: MEDICARE

## 2021-08-27 DIAGNOSIS — M1A.00X0 IDIOPATHIC CHRONIC GOUT WITHOUT TOPHUS, UNSPECIFIED SITE: ICD-10-CM

## 2021-08-27 DIAGNOSIS — I10 ESSENTIAL HYPERTENSION, MALIGNANT: ICD-10-CM

## 2021-08-27 DIAGNOSIS — E03.9 MYXEDEMA HEART DISEASE: ICD-10-CM

## 2021-08-27 DIAGNOSIS — I51.9 MYXEDEMA HEART DISEASE: ICD-10-CM

## 2021-08-27 DIAGNOSIS — E11.9 DIABETES MELLITUS WITHOUT COMPLICATION (HCC): ICD-10-CM

## 2021-08-27 LAB
25(OH)D3 SERPL-MCNC: 57 NG/ML (ref 30–100)
ANION GAP SERPL CALCULATED.3IONS-SCNC: 5 MMOL/L (ref 4–13)
BASOPHILS # BLD AUTO: 0.03 THOUSANDS/ΜL (ref 0–0.1)
BASOPHILS NFR BLD AUTO: 1 % (ref 0–1)
BUN SERPL-MCNC: 18 MG/DL (ref 5–25)
CALCIUM SERPL-MCNC: 9.2 MG/DL (ref 8.3–10.1)
CHLORIDE SERPL-SCNC: 106 MMOL/L (ref 100–108)
CHOLEST SERPL-MCNC: 155 MG/DL (ref 50–200)
CO2 SERPL-SCNC: 28 MMOL/L (ref 21–32)
CREAT SERPL-MCNC: 0.82 MG/DL (ref 0.6–1.3)
EOSINOPHIL # BLD AUTO: 0.21 THOUSAND/ΜL (ref 0–0.61)
EOSINOPHIL NFR BLD AUTO: 4 % (ref 0–6)
ERYTHROCYTE [DISTWIDTH] IN BLOOD BY AUTOMATED COUNT: 13.3 % (ref 11.6–15.1)
EST. AVERAGE GLUCOSE BLD GHB EST-MCNC: 128 MG/DL
GFR SERPL CREATININE-BSD FRML MDRD: 71 ML/MIN/1.73SQ M
GLUCOSE P FAST SERPL-MCNC: 120 MG/DL (ref 65–99)
HBA1C MFR BLD: 6.1 %
HCT VFR BLD AUTO: 41.8 % (ref 34.8–46.1)
HDLC SERPL-MCNC: 35 MG/DL
HGB BLD-MCNC: 13.9 G/DL (ref 11.5–15.4)
IMM GRANULOCYTES # BLD AUTO: 0.01 THOUSAND/UL (ref 0–0.2)
IMM GRANULOCYTES NFR BLD AUTO: 0 % (ref 0–2)
LDLC SERPL CALC-MCNC: 97 MG/DL (ref 0–100)
LYMPHOCYTES # BLD AUTO: 2.07 THOUSANDS/ΜL (ref 0.6–4.47)
LYMPHOCYTES NFR BLD AUTO: 35 % (ref 14–44)
MCH RBC QN AUTO: 32.1 PG (ref 26.8–34.3)
MCHC RBC AUTO-ENTMCNC: 33.3 G/DL (ref 31.4–37.4)
MCV RBC AUTO: 97 FL (ref 82–98)
MONOCYTES # BLD AUTO: 0.41 THOUSAND/ΜL (ref 0.17–1.22)
MONOCYTES NFR BLD AUTO: 7 % (ref 4–12)
NEUTROPHILS # BLD AUTO: 3.14 THOUSANDS/ΜL (ref 1.85–7.62)
NEUTS SEG NFR BLD AUTO: 53 % (ref 43–75)
NONHDLC SERPL-MCNC: 120 MG/DL
NRBC BLD AUTO-RTO: 0 /100 WBCS
PLATELET # BLD AUTO: 236 THOUSANDS/UL (ref 149–390)
PMV BLD AUTO: 11.4 FL (ref 8.9–12.7)
POTASSIUM SERPL-SCNC: 4 MMOL/L (ref 3.5–5.3)
RBC # BLD AUTO: 4.33 MILLION/UL (ref 3.81–5.12)
SODIUM SERPL-SCNC: 139 MMOL/L (ref 136–145)
T4 FREE SERPL-MCNC: 0.91 NG/DL (ref 0.76–1.46)
TRIGL SERPL-MCNC: 114 MG/DL
TSH SERPL DL<=0.05 MIU/L-ACNC: 1.42 UIU/ML (ref 0.36–3.74)
WBC # BLD AUTO: 5.87 THOUSAND/UL (ref 4.31–10.16)

## 2021-08-27 PROCEDURE — 84439 ASSAY OF FREE THYROXINE: CPT

## 2021-08-27 PROCEDURE — 82306 VITAMIN D 25 HYDROXY: CPT

## 2021-08-27 PROCEDURE — 83036 HEMOGLOBIN GLYCOSYLATED A1C: CPT

## 2021-08-27 PROCEDURE — 80048 BASIC METABOLIC PNL TOTAL CA: CPT

## 2021-08-27 PROCEDURE — 85025 COMPLETE CBC W/AUTO DIFF WBC: CPT

## 2021-08-27 PROCEDURE — 36415 COLL VENOUS BLD VENIPUNCTURE: CPT

## 2021-08-27 PROCEDURE — 80061 LIPID PANEL: CPT

## 2021-08-27 PROCEDURE — 84443 ASSAY THYROID STIM HORMONE: CPT

## 2021-10-14 ENCOUNTER — ANNUAL EXAM (OUTPATIENT)
Dept: GYNECOLOGY | Facility: CLINIC | Age: 76
End: 2021-10-14
Payer: MEDICARE

## 2021-10-14 VITALS
HEIGHT: 66 IN | WEIGHT: 212.8 LBS | BODY MASS INDEX: 34.2 KG/M2 | HEART RATE: 64 BPM | SYSTOLIC BLOOD PRESSURE: 120 MMHG | DIASTOLIC BLOOD PRESSURE: 64 MMHG

## 2021-10-14 DIAGNOSIS — Z01.419 ENCOUNTER FOR GYNECOLOGICAL EXAMINATION WITHOUT ABNORMAL FINDING: Primary | ICD-10-CM

## 2021-10-14 DIAGNOSIS — N95.2 ATROPHIC VAGINITIS: ICD-10-CM

## 2021-10-14 DIAGNOSIS — Z13.820 ENCOUNTER FOR SCREENING FOR OSTEOPOROSIS: ICD-10-CM

## 2021-10-14 DIAGNOSIS — Z12.4 ENCOUNTER FOR PAPANICOLAOU SMEAR FOR CERVICAL CANCER SCREENING: ICD-10-CM

## 2021-10-14 DIAGNOSIS — M85.80 OSTEOPENIA, UNSPECIFIED LOCATION: ICD-10-CM

## 2021-10-14 DIAGNOSIS — Z78.0 MENOPAUSE: ICD-10-CM

## 2021-10-14 DIAGNOSIS — Z12.31 ENCOUNTER FOR SCREENING MAMMOGRAM FOR MALIGNANT NEOPLASM OF BREAST: ICD-10-CM

## 2021-10-14 PROCEDURE — G0145 SCR C/V CYTO,THINLAYER,RESCR: HCPCS | Performed by: OBSTETRICS & GYNECOLOGY

## 2021-10-14 PROCEDURE — G0101 CA SCREEN;PELVIC/BREAST EXAM: HCPCS | Performed by: OBSTETRICS & GYNECOLOGY

## 2021-10-14 RX ORDER — METFORMIN HYDROCHLORIDE EXTENDED-RELEASE TABLETS 1000 MG/1
TABLET, FILM COATED, EXTENDED RELEASE ORAL
COMMUNITY
Start: 2021-09-21

## 2021-10-14 RX ORDER — DOXYCYCLINE 100 MG/1
100 CAPSULE ORAL
COMMUNITY
Start: 2021-08-17

## 2021-10-22 LAB
LAB AP GYN PRIMARY INTERPRETATION: NORMAL
Lab: NORMAL

## 2021-11-05 ENCOUNTER — HOSPITAL ENCOUNTER (OUTPATIENT)
Dept: RADIOLOGY | Facility: HOSPITAL | Age: 76
Discharge: HOME | End: 2021-11-05
Payer: MEDICARE

## 2021-11-05 ENCOUNTER — HOSPITAL ENCOUNTER (OUTPATIENT)
Dept: ULTRASOUND IMAGING | Facility: HOSPITAL | Age: 76
Discharge: HOME | End: 2021-11-05
Payer: MEDICARE

## 2021-11-05 DIAGNOSIS — N20.0 NEPHROLITHIASIS: ICD-10-CM

## 2021-11-05 PROCEDURE — 74018 RADEX ABDOMEN 1 VIEW: CPT

## 2021-11-05 PROCEDURE — 76770 US EXAM ABDO BACK WALL COMP: CPT

## 2021-11-18 ENCOUNTER — OFFICE VISIT (OUTPATIENT)
Dept: UROLOGY | Facility: CLINIC | Age: 76
End: 2021-11-18
Payer: MEDICARE

## 2021-11-18 VITALS
DIASTOLIC BLOOD PRESSURE: 80 MMHG | BODY MASS INDEX: 34.54 KG/M2 | WEIGHT: 214 LBS | HEART RATE: 70 BPM | SYSTOLIC BLOOD PRESSURE: 136 MMHG

## 2021-11-18 DIAGNOSIS — N20.0 NEPHROLITHIASIS: Primary | ICD-10-CM

## 2021-11-18 DIAGNOSIS — N28.1 RENAL CYST: ICD-10-CM

## 2021-11-18 PROCEDURE — 99213 OFFICE O/P EST LOW 20 MIN: CPT | Performed by: NURSE PRACTITIONER

## 2021-12-08 DIAGNOSIS — L90.0 LICHEN SCLEROSUS: ICD-10-CM

## 2021-12-08 RX ORDER — CLOBETASOL PROPIONATE 0.5 MG/G
CREAM TOPICAL 2 TIMES DAILY
Qty: 30 G | Refills: 3 | Status: SHIPPED | OUTPATIENT
Start: 2021-12-08 | End: 2021-12-22

## 2021-12-30 PROCEDURE — 87636 SARSCOV2 & INF A&B AMP PRB: CPT | Performed by: INTERNAL MEDICINE

## 2022-01-27 ENCOUNTER — HOSPITAL ENCOUNTER (OUTPATIENT)
Dept: RADIOLOGY | Facility: CLINIC | Age: 77
Discharge: HOME | End: 2022-01-27
Payer: MEDICARE

## 2022-01-27 ENCOUNTER — HOSPITAL ENCOUNTER (OUTPATIENT)
Dept: NON INVASIVE DIAGNOSTICS | Facility: HOSPITAL | Age: 77
Discharge: HOME | End: 2022-01-27
Payer: MEDICARE

## 2022-01-27 VITALS — HEIGHT: 66 IN | WEIGHT: 211 LBS | BODY MASS INDEX: 33.91 KG/M2

## 2022-01-27 DIAGNOSIS — Z13.820 ENCOUNTER FOR SCREENING FOR OSTEOPOROSIS: ICD-10-CM

## 2022-01-27 DIAGNOSIS — Z78.0 MENOPAUSE: ICD-10-CM

## 2022-01-27 DIAGNOSIS — Z12.31 ENCOUNTER FOR SCREENING MAMMOGRAM FOR MALIGNANT NEOPLASM OF BREAST: ICD-10-CM

## 2022-01-27 DIAGNOSIS — M85.80 OSTEOPENIA, UNSPECIFIED LOCATION: ICD-10-CM

## 2022-01-27 DIAGNOSIS — I25.10 CVD (CARDIOVASCULAR DISEASE): ICD-10-CM

## 2022-01-27 PROCEDURE — 77067 SCR MAMMO BI INCL CAD: CPT

## 2022-01-27 PROCEDURE — 77063 BREAST TOMOSYNTHESIS BI: CPT

## 2022-01-27 PROCEDURE — 93880 EXTRACRANIAL BILAT STUDY: CPT

## 2022-01-27 PROCEDURE — 77080 DXA BONE DENSITY AXIAL: CPT

## 2022-01-28 ENCOUNTER — APPOINTMENT (OUTPATIENT)
Dept: LAB | Facility: MEDICAL CENTER | Age: 77
End: 2022-01-28
Payer: MEDICARE

## 2022-01-28 DIAGNOSIS — E55.9 VITAMIN D DEFICIENCY: ICD-10-CM

## 2022-01-28 DIAGNOSIS — E11.9 TYPE 2 DIABETES MELLITUS WITHOUT COMPLICATION, WITH LONG-TERM CURRENT USE OF INSULIN (HCC): ICD-10-CM

## 2022-01-28 DIAGNOSIS — M10.9 GOUT, UNSPECIFIED CAUSE, UNSPECIFIED CHRONICITY, UNSPECIFIED SITE: ICD-10-CM

## 2022-01-28 DIAGNOSIS — Z79.4 TYPE 2 DIABETES MELLITUS WITHOUT COMPLICATION, WITH LONG-TERM CURRENT USE OF INSULIN (HCC): ICD-10-CM

## 2022-01-28 DIAGNOSIS — I10 HYPERTENSION, UNSPECIFIED TYPE: ICD-10-CM

## 2022-01-28 DIAGNOSIS — E78.5 HYPERLIPIDEMIA, UNSPECIFIED HYPERLIPIDEMIA TYPE: ICD-10-CM

## 2022-01-28 DIAGNOSIS — E03.9 HYPOTHYROIDISM, UNSPECIFIED TYPE: ICD-10-CM

## 2022-01-28 LAB
25(OH)D3 SERPL-MCNC: 43.3 NG/ML (ref 30–100)
ALBUMIN SERPL BCP-MCNC: 3.8 G/DL (ref 3.5–5)
ALP SERPL-CCNC: 50 U/L (ref 46–116)
ALT SERPL W P-5'-P-CCNC: 30 U/L (ref 12–78)
ANION GAP SERPL CALCULATED.3IONS-SCNC: 6 MMOL/L (ref 4–13)
AST SERPL W P-5'-P-CCNC: 18 U/L (ref 5–45)
BASOPHILS # BLD AUTO: 0.05 THOUSANDS/ΜL (ref 0–0.1)
BASOPHILS NFR BLD AUTO: 1 % (ref 0–1)
BILIRUB SERPL-MCNC: 0.61 MG/DL (ref 0.2–1)
BUN SERPL-MCNC: 18 MG/DL (ref 5–25)
CALCIUM SERPL-MCNC: 9.8 MG/DL (ref 8.3–10.1)
CHLORIDE SERPL-SCNC: 105 MMOL/L (ref 100–108)
CHOLEST SERPL-MCNC: 194 MG/DL
CO2 SERPL-SCNC: 27 MMOL/L (ref 21–32)
CREAT SERPL-MCNC: 0.85 MG/DL (ref 0.6–1.3)
EOSINOPHIL # BLD AUTO: 0.27 THOUSAND/ΜL (ref 0–0.61)
EOSINOPHIL NFR BLD AUTO: 4 % (ref 0–6)
ERYTHROCYTE [DISTWIDTH] IN BLOOD BY AUTOMATED COUNT: 13 % (ref 11.6–15.1)
GFR SERPL CREATININE-BSD FRML MDRD: 67 ML/MIN/1.73SQ M
GLUCOSE P FAST SERPL-MCNC: 121 MG/DL (ref 65–99)
HCT VFR BLD AUTO: 43.3 % (ref 34.8–46.1)
HDLC SERPL-MCNC: 36 MG/DL
HGB BLD-MCNC: 14.7 G/DL (ref 11.5–15.4)
IMM GRANULOCYTES # BLD AUTO: 0.01 THOUSAND/UL (ref 0–0.2)
IMM GRANULOCYTES NFR BLD AUTO: 0 % (ref 0–2)
LDLC SERPL CALC-MCNC: 137 MG/DL (ref 0–100)
LYMPHOCYTES # BLD AUTO: 2.05 THOUSANDS/ΜL (ref 0.6–4.47)
LYMPHOCYTES NFR BLD AUTO: 32 % (ref 14–44)
MCH RBC QN AUTO: 32.9 PG (ref 26.8–34.3)
MCHC RBC AUTO-ENTMCNC: 33.9 G/DL (ref 31.4–37.4)
MCV RBC AUTO: 97 FL (ref 82–98)
MONOCYTES # BLD AUTO: 0.42 THOUSAND/ΜL (ref 0.17–1.22)
MONOCYTES NFR BLD AUTO: 7 % (ref 4–12)
NEUTROPHILS # BLD AUTO: 3.53 THOUSANDS/ΜL (ref 1.85–7.62)
NEUTS SEG NFR BLD AUTO: 56 % (ref 43–75)
NONHDLC SERPL-MCNC: 158 MG/DL
NRBC BLD AUTO-RTO: 0 /100 WBCS
PLATELET # BLD AUTO: 244 THOUSANDS/UL (ref 149–390)
PMV BLD AUTO: 10.6 FL (ref 8.9–12.7)
POTASSIUM SERPL-SCNC: 4 MMOL/L (ref 3.5–5.3)
PROT SERPL-MCNC: 7.9 G/DL (ref 6.4–8.2)
RBC # BLD AUTO: 4.47 MILLION/UL (ref 3.81–5.12)
SODIUM SERPL-SCNC: 138 MMOL/L (ref 136–145)
T4 FREE SERPL-MCNC: 0.93 NG/DL (ref 0.76–1.46)
TRIGL SERPL-MCNC: 107 MG/DL
TSH SERPL DL<=0.05 MIU/L-ACNC: 1.48 UIU/ML (ref 0.36–3.74)
URATE SERPL-MCNC: 5.8 MG/DL (ref 2–6.8)
WBC # BLD AUTO: 6.33 THOUSAND/UL (ref 4.31–10.16)

## 2022-01-28 PROCEDURE — 84439 ASSAY OF FREE THYROXINE: CPT

## 2022-01-28 PROCEDURE — 82306 VITAMIN D 25 HYDROXY: CPT

## 2022-01-28 PROCEDURE — 80061 LIPID PANEL: CPT

## 2022-01-28 PROCEDURE — 80053 COMPREHEN METABOLIC PANEL: CPT

## 2022-01-28 PROCEDURE — 36415 COLL VENOUS BLD VENIPUNCTURE: CPT

## 2022-01-28 PROCEDURE — 84550 ASSAY OF BLOOD/URIC ACID: CPT

## 2022-01-28 PROCEDURE — 93880 EXTRACRANIAL BILAT STUDY: CPT | Performed by: SURGERY

## 2022-01-28 PROCEDURE — 85025 COMPLETE CBC W/AUTO DIFF WBC: CPT

## 2022-01-28 PROCEDURE — 84443 ASSAY THYROID STIM HORMONE: CPT

## 2022-02-01 ENCOUNTER — APPOINTMENT (OUTPATIENT)
Dept: LAB | Facility: MEDICAL CENTER | Age: 77
End: 2022-02-01
Payer: MEDICARE

## 2022-02-01 DIAGNOSIS — E11.9 TYPE 2 DIABETES MELLITUS WITHOUT COMPLICATION, WITH LONG-TERM CURRENT USE OF INSULIN (HCC): ICD-10-CM

## 2022-02-01 DIAGNOSIS — Z79.4 TYPE 2 DIABETES MELLITUS WITHOUT COMPLICATION, WITH LONG-TERM CURRENT USE OF INSULIN (HCC): ICD-10-CM

## 2022-02-01 LAB
EST. AVERAGE GLUCOSE BLD GHB EST-MCNC: 123 MG/DL
HBA1C MFR BLD: 5.9 %

## 2022-02-01 PROCEDURE — 83036 HEMOGLOBIN GLYCOSYLATED A1C: CPT

## 2022-02-01 PROCEDURE — 36415 COLL VENOUS BLD VENIPUNCTURE: CPT

## 2022-05-06 ENCOUNTER — HOSPITAL ENCOUNTER (OUTPATIENT)
Dept: ULTRASOUND IMAGING | Facility: HOSPITAL | Age: 77
Discharge: HOME | End: 2022-05-06
Payer: MEDICARE

## 2022-05-06 DIAGNOSIS — N20.0 NEPHROLITHIASIS: ICD-10-CM

## 2022-05-06 DIAGNOSIS — N28.1 RENAL CYST: ICD-10-CM

## 2022-05-06 PROCEDURE — 76770 US EXAM ABDO BACK WALL COMP: CPT

## 2022-05-23 ENCOUNTER — OFFICE VISIT (OUTPATIENT)
Dept: UROLOGY | Facility: CLINIC | Age: 77
End: 2022-05-23
Payer: MEDICARE

## 2022-05-23 VITALS
WEIGHT: 215 LBS | SYSTOLIC BLOOD PRESSURE: 122 MMHG | DIASTOLIC BLOOD PRESSURE: 82 MMHG | BODY MASS INDEX: 34.7 KG/M2 | HEART RATE: 70 BPM

## 2022-05-23 DIAGNOSIS — N20.0 NEPHROLITHIASIS: Primary | ICD-10-CM

## 2022-05-23 DIAGNOSIS — N28.1 RENAL CYST: ICD-10-CM

## 2022-05-23 LAB
SL AMB  POCT GLUCOSE, UA: NEGATIVE
SL AMB LEUKOCYTE ESTERASE,UA: NEGATIVE
SL AMB POCT BILIRUBIN,UA: NEGATIVE
SL AMB POCT BLOOD,UA: NEGATIVE
SL AMB POCT CLARITY,UA: CLEAR
SL AMB POCT COLOR,UA: NORMAL
SL AMB POCT KETONES,UA: NEGATIVE
SL AMB POCT NITRITE,UA: NEGATIVE
SL AMB POCT PH,UA: 6
SL AMB POCT SPECIFIC GRAVITY,UA: 1
SL AMB POCT URINE PROTEIN: NEGATIVE
SL AMB POCT UROBILINOGEN: 0.2

## 2022-05-23 PROCEDURE — 81002 URINALYSIS NONAUTO W/O SCOPE: CPT | Performed by: NURSE PRACTITIONER

## 2022-05-23 PROCEDURE — 99213 OFFICE O/P EST LOW 20 MIN: CPT | Performed by: NURSE PRACTITIONER

## 2022-05-23 NOTE — PROGRESS NOTES
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  She also to 1 cm right upper pole to mid pole simple cyst   Her repeat ultrasound is stable  Results were previously reviewed with Dr Esvin Mathews who recommended no further imaging is needed as long as her ultrasound remains stable  She denies any urinary complaints  Denies any gross hematuria  Her urine dip in the office was unremarkable      Laboratory     Lab Results   Component Value Date    BUN 18 01/28/2022    CREATININE 0 85 01/28/2022       No components found for: GFR    Lab Results   Component Value Date    CALCIUM 9 8 01/28/2022    K 4 0 01/28/2022    CO2 27 01/28/2022     01/28/2022       Lab Results   Component Value Date    WBC 6 33 01/28/2022    HGB 14 7 01/28/2022    HCT 43 3 01/28/2022    MCV 97 01/28/2022     01/28/2022       No results found for: PSA    Recent Results (from the past 1 hour(s))   POCT urine dip    Collection Time: 05/23/22 10:33 AM   Result Value Ref Range    LEUKOCYTE ESTERASE,UA Negative     NITRITE,UA Negative     SL AMB POCT UROBILINOGEN 0 2     POCT URINE PROTEIN Negative      PH,UA 6 0     BLOOD,UA Negative     SPECIFIC GRAVITY,UA 1 005     KETONES,UA Negative     BILIRUBIN,UA Negative     GLUCOSE, UA Negative      COLOR,UA Pale yellow     CLARITY,UA Clear @RESULT(URINEMICROSCOPIC)@    @RESULT(URINECULTURE)@    Radiology     RENAL ULTRASOUND 05/06/2022     INDICATION:   N20 0: Calculus of kidney  N28 1: Cyst of kidney, acquired      COMPARISON: Renal Ultrasound 11/5/2021     TECHNIQUE:   Ultrasound of the retroperitoneum was performed with a curvilinear transducer utilizing volumetric sweeps and still imaging techniques       FINDINGS:     KIDNEYS:  Symmetric and normal size  Right kidney:  10 5 x 4 9 x 6 1 cm  Volume 163 6 mL  Left kidney:  12 4 x 5 6 x 5 6 cm  Volume 202 5 mL     Right kidney  Normal echogenicity and contour  Stable homogeneous echogenic lesion lower pole left kidney measuring up to 8 mm; Stable 1 cm simple cyst in midportion  Mild fullness of the right renal pelvis and calyces similar to prior  No shadowing calculi  No perinephric fluid collections      Left kidney  Normal echogenicity and contour  No mass is identified  No hydronephrosis  3 mm calculus in midportion  No perinephric fluid collections      URETERS:  Nonvisualized      BLADDER:   Normally distended  No focal thickening or mass lesions  Bilateral ureteral jets detected            IMPRESSION:     No significant interval change including mild fullness of the right renal pelvis and calyces, nonobstructing 3 mm stone in the left kidney and probable subcentimeter right angiomyolipoma  Review of Systems     Review of Systems   Constitutional: Negative for activity change, appetite change, chills, fatigue, fever and unexpected weight change  HENT: Negative for facial swelling  Eyes: Negative for discharge  Respiratory: Negative  Negative for cough and shortness of breath  Cardiovascular: Negative for chest pain and leg swelling  Gastrointestinal: Negative  Negative for abdominal distention, abdominal pain, constipation, diarrhea, nausea and vomiting  Endocrine: Negative  Genitourinary: Negative    Negative for decreased urine volume, difficulty urinating, dysuria, enuresis, flank pain, frequency, genital sores, hematuria and urgency  Musculoskeletal: Negative for back pain and myalgias  Skin: Negative for pallor and rash  Allergic/Immunologic: Negative  Negative for immunocompromised state  Neurological: Negative for facial asymmetry and speech difficulty  Psychiatric/Behavioral: Negative for agitation and confusion  Allergies     Allergies   Allergen Reactions    Gluten Meal - Food Allergy     Diclofenac Rash    Diclofenac Sodium Rash    Penicillins Rash       Physical Exam     Physical Exam  Vitals reviewed  Constitutional:       General: She is not in acute distress  Appearance: Normal appearance  She is obese  She is not ill-appearing, toxic-appearing or diaphoretic  HENT:      Head: Normocephalic and atraumatic  Eyes:      General: No scleral icterus  Cardiovascular:      Rate and Rhythm: Normal rate  Pulmonary:      Effort: Pulmonary effort is normal  No respiratory distress  Abdominal:      General: Abdomen is flat  There is no distension  Palpations: Abdomen is soft  Musculoskeletal:         General: No swelling  Cervical back: Normal range of motion  Skin:     General: Skin is warm and dry  Coloration: Skin is not jaundiced or pale  Findings: No rash  Neurological:      General: No focal deficit present  Mental Status: She is alert and oriented to person, place, and time  Gait: Gait normal    Psychiatric:         Mood and Affect: Mood normal          Behavior: Behavior normal          Thought Content:  Thought content normal          Judgment: Judgment normal          Vital Signs     Vitals:    05/23/22 0956   BP: 122/82   Pulse: 70   Weight: 97 5 kg (215 lb)       Current Medications       Current Outpatient Medications:     allopurinol (ZYLOPRIM) 100 mg tablet, , Disp: , Rfl:     ascorbic acid (VITAMIN C) 500 mg tablet, Take 500 mg by mouth daily, Disp: , Rfl:    Calcium-Phosphorus-Vitamin D (CITRACAL +D3 PO), Take by mouth, Disp: , Rfl:     cholecalciferol (VITAMIN D3) 1,000 units tablet, Take 2,000 Units by mouth daily, Disp: , Rfl:     citalopram (CeleXA) 10 mg tablet, Take 10 mg by mouth, Disp: , Rfl: 3    cycloSPORINE (RESTASIS) 0 05 % ophthalmic emulsion, 1 drop 2 (two) times a day, Disp: , Rfl:     doxycycline monohydrate (MONODOX) 100 mg capsule, Take 100 mg by mouth, Disp: , Rfl:     estradiol (ESTRACE) 0 1 mg/g vaginal cream, 1 gram vaginally  weekly, Disp: 42 5 g, Rfl: 3    fexofenadine (ALLEGRA) 180 MG tablet, Take 180 mg by mouth daily, Disp: , Rfl:     fluticasone (FLONASE) 50 mcg/act nasal spray, , Disp: , Rfl: 2    levothyroxine 75 mcg tablet, , Disp: , Rfl:     lisinopril-hydrochlorothiazide (PRINZIDE,ZESTORETIC) 10-12 5 MG per tablet, , Disp: , Rfl:     metFORMIN (FORTAMET) 1000 MG (OSM) 24 hr tablet, , Disp: , Rfl:     mometasone (ELOCON) 0 1 % cream, Apply topically daily Apply topically BID x 2 weeks then qhs prn, Disp: 45 g, Rfl: 2    Multiple Vitamins-Minerals (ONE-A-DAY WOMENS PETITES PO), Take by mouth, Disp: , Rfl:     Omega-3 Fatty Acids (FISH OIL) 645 MG CAPS, Take by mouth, Disp: , Rfl:     Probiotic Product (PROBIOTIC DAILY PO), Take by mouth, Disp: , Rfl:     SUMAtriptan (IMITREX) 50 mg tablet, Take by mouth, Disp: , Rfl:     Turmeric Curcumin 500 MG CAPS, Take by mouth, Disp: , Rfl:     AzaSite 1 % ophthalmic solution, , Disp: , Rfl:     clobetasol (TEMOVATE) 0 05 % cream, Apply topically 2 (two) times a day for 14 days, Disp: 30 g, Rfl: 3    neomycin-polymyxin-hydrocortisone (CORTISPORIN) 0 35%-10,000 units/mL-1% otic suspension, Administer into ears (Patient not taking: No sig reported), Disp: , Rfl:     Pazeo 0 7 % SOLN, , Disp: , Rfl:     prednisoLONE acetate (PRED FORTE) 1 % ophthalmic suspension, , Disp: , Rfl:     White Petrolatum-Mineral Oil (REFRESH LACRI-LUBE OP), Apply to eye, Disp: , Rfl:     Active Problems Patient Active Problem List   Diagnosis    Nephrolithiasis    DEEPAK on CPAP    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    Varicose veins of both lower extremities with complications    Renal cyst       Past Medical History     Past Medical History:   Diagnosis Date    Allergic rhinitis     Anxiety     Disease of thyroid gland     Dry eye     Hypertension        Surgical History     Past Surgical History:   Procedure Laterality Date    CATARACT EXTRACTION, BILATERAL  01/2020    CHOLECYSTECTOMY      TONSILLECTOMY      VARICOSE VEIN SURGERY         Family History     Family History   Problem Relation Age of Onset    Heart disease Mother     Heart disease Father     Diabetes Father     Heart disease Sister     Diabetes Sister     No Known Problems Daughter     No Known Problems Maternal Grandmother     No Known Problems Maternal Grandfather     No Known Problems Paternal Grandmother     No Known Problems Paternal Grandfather     No Known Problems Daughter     No Known Problems Maternal Aunt     No Known Problems Paternal Aunt     No Known Problems Paternal Aunt     No Known Problems Paternal Aunt     No Known Problems Paternal Aunt     Heart disease Brother        Social History     Social History     Social History     Tobacco Use   Smoking Status Never Smoker   Smokeless Tobacco Never Used       Past Surgical History:   Procedure Laterality Date    CATARACT EXTRACTION, BILATERAL  01/2020    CHOLECYSTECTOMY      TONSILLECTOMY      VARICOSE VEIN SURGERY           The following portions of the patient's history were reviewed and updated as appropriate: allergies, current medications, past family history, past medical history, past social history, past surgical history and problem list    Please note :  Voice dictation software has been used to create this document  There may be inadvertent transcription errors      [id number] [id number] Mariya Jacobo

## 2022-05-23 NOTE — ASSESSMENT & PLAN NOTE
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

## 2022-05-23 NOTE — PATIENT INSTRUCTIONS
Dietary Management of Kidney Stone Disease    The dietary recommendations for most people who make kidney stones (especially the most common calcium oxalate stones) are uncomplicated and are not too tedious or bland  Most importantly, the following recommendations also promote better health for a variety of reasons  FLUIDS:  The single most important change for the majority patients is the need to greatly increase fluid intake  You should at least produce two liters (about two quarts) of urine each day  Depending on the heat outdoors and your level of physical activity, this usually means consuming ten, 10 ounce glasses (100 ounces) of fluid per day  Water is always a good choice, but other drinks including tea, coffee, soda, and juice are also allowed as long as no one beverage becomes the sole source of fluid  CALCIUM:  There is excellent evidence that calcium should not be avoided, but instead moderated  A range of 600 to 1,100 mg of calcium per day, especially consumed at meals is probably a reasonable target  (i e  2-3 dairy servings per day) This might include small servings of yogurt, milk or ice cream   This amount helps avoid over-absorption of oxalate from the digestive tract and also allows for healthy bone maintenance  SODIUM (SALT): Too much salt in your diet (both from the shaker and in the prepared foods that we buy) is bad for your blood pressure, bad for your heart, and also increases the amount of calcium in your urine  A reasonable sodium restriction to 2,000-2,500mg/day (about the amount in one teaspoon) is an excellent target  You should get into the habit of reading the Nutrients labels on all the foods that you eat and watch out for the foods that have a high sodium content (snack foods, smoked or processed foods, caned foods)  Fresh and frozen foods usually have the least amount of sodium      PROTEIN:  High protein diets from animal meat (beef, chicken, pork, fish) also increases the rate of kidney stone formation and is equally unhealthy for your heart  All patients should moderate their meat intake to 3-7 ounces per day, and particularly stay away from red meat protein  OXALATE:  Most stone-formers should avoid heavy intake of oxalate-rich foods  These include green roughage (spinach, mustard, kale), strawberries, chocolate, tea, iced tea, and nuts  In addition, heavy, excess doses of Vitamin C can also produce surges in urinary oxalate levels and should be avoided  ** THESE FOODS ARE HIGH IN OXALATE - TRY TO LIMIT HOW MUCH OF THESE YOU EAT:  Coke and Pepsi  Nuts  Dark Leafy Greens:     Spinach and Kale, Rhubarb, Chard  Asparagus  Beets  Sweet potatoes   Blueberries, Strawberries   Dark tea (Green tea is okay)  Tofu      DRINK 3 QUARTS (96 Oz ) LIQUIDS EACH DAY - ALL LIQUIDS COUNT        ADD LEMON JUICE 3 OZ  DAILY - Fresh squeezed or lemon juice concentrate - Not MinuteMaid, [place]ag[place]  [place] ([facility] [place], Crystal Lite, etc         ** Recipe for JORDAN'S OLDAYLIN TYME LEMONADE:         One ounce of lemon juice, glass of water, sweetener of your choice    Coffee is okay! Cranberry juice is good to prevent infections, but does not help for stones  BARE-BONES RECOMMENDATIONS:  Fluids, fluids, fluids  Low salt diet (your primary care doctor will love you)  Moderate red meat intake  Moderate calcium (dairy products), especially with meals

## 2022-05-23 NOTE — ASSESSMENT & PLAN NOTE
· Ultrasound/KUB with unchanged 3 millimeter left kidney stone  · Reviewed AUA dietary recommendations and hydration goals  · Will follow-up in 1 year repeat ultrasound and then yearly

## 2022-07-05 DIAGNOSIS — N95.2 ATROPHIC VAGINITIS: ICD-10-CM

## 2022-07-06 RX ORDER — ESTRADIOL 0.1 MG/G
CREAM VAGINAL
Qty: 42.5 G | Refills: 3 | Status: SHIPPED | OUTPATIENT
Start: 2022-07-06

## 2022-07-07 ENCOUNTER — APPOINTMENT (OUTPATIENT)
Dept: LAB | Facility: MEDICAL CENTER | Age: 77
End: 2022-07-07
Payer: MEDICARE

## 2022-07-07 DIAGNOSIS — I10 HYPERTENSION, UNSPECIFIED TYPE: ICD-10-CM

## 2022-07-07 DIAGNOSIS — E78.5 HYPERLIPIDEMIA, UNSPECIFIED HYPERLIPIDEMIA TYPE: ICD-10-CM

## 2022-07-07 DIAGNOSIS — E03.9 HYPOTHYROIDISM, UNSPECIFIED TYPE: ICD-10-CM

## 2022-07-07 LAB
CHOLEST SERPL-MCNC: 172 MG/DL
HDLC SERPL-MCNC: 39 MG/DL
LDLC SERPL CALC-MCNC: 106 MG/DL (ref 0–100)
NONHDLC SERPL-MCNC: 133 MG/DL
T4 FREE SERPL-MCNC: 0.88 NG/DL (ref 0.76–1.46)
TRIGL SERPL-MCNC: 137 MG/DL
TSH SERPL DL<=0.05 MIU/L-ACNC: 2.02 UIU/ML (ref 0.45–4.5)

## 2022-07-07 PROCEDURE — 80061 LIPID PANEL: CPT

## 2022-07-07 PROCEDURE — 36415 COLL VENOUS BLD VENIPUNCTURE: CPT

## 2022-07-07 PROCEDURE — 84439 ASSAY OF FREE THYROXINE: CPT

## 2022-07-07 PROCEDURE — 83036 HEMOGLOBIN GLYCOSYLATED A1C: CPT

## 2022-07-07 PROCEDURE — 84443 ASSAY THYROID STIM HORMONE: CPT

## 2022-07-08 LAB
EST. AVERAGE GLUCOSE BLD GHB EST-MCNC: 123 MG/DL
HBA1C MFR BLD: 5.9 %

## 2022-07-29 ENCOUNTER — APPOINTMENT (OUTPATIENT)
Dept: LAB | Facility: MEDICAL CENTER | Age: 77
End: 2022-07-29
Payer: MEDICARE

## 2022-07-29 DIAGNOSIS — M1A.09X0 IDIOPATHIC CHRONIC GOUT OF MULTIPLE SITES WITHOUT TOPHUS: ICD-10-CM

## 2022-07-29 LAB
ALBUMIN SERPL BCP-MCNC: 3.6 G/DL (ref 3.5–5)
ALP SERPL-CCNC: 46 U/L (ref 46–116)
ALT SERPL W P-5'-P-CCNC: 41 U/L (ref 12–78)
ANION GAP SERPL CALCULATED.3IONS-SCNC: 6 MMOL/L (ref 4–13)
AST SERPL W P-5'-P-CCNC: 28 U/L (ref 5–45)
BASOPHILS # BLD AUTO: 0.05 THOUSANDS/ΜL (ref 0–0.1)
BASOPHILS NFR BLD AUTO: 1 % (ref 0–1)
BILIRUB SERPL-MCNC: 0.41 MG/DL (ref 0.2–1)
BUN SERPL-MCNC: 14 MG/DL (ref 5–25)
CALCIUM SERPL-MCNC: 9.7 MG/DL (ref 8.3–10.1)
CHLORIDE SERPL-SCNC: 105 MMOL/L (ref 96–108)
CO2 SERPL-SCNC: 27 MMOL/L (ref 21–32)
CREAT SERPL-MCNC: 0.91 MG/DL (ref 0.6–1.3)
EOSINOPHIL # BLD AUTO: 0.28 THOUSAND/ΜL (ref 0–0.61)
EOSINOPHIL NFR BLD AUTO: 4 % (ref 0–6)
ERYTHROCYTE [DISTWIDTH] IN BLOOD BY AUTOMATED COUNT: 13.3 % (ref 11.6–15.1)
GFR SERPL CREATININE-BSD FRML MDRD: 61 ML/MIN/1.73SQ M
GLUCOSE P FAST SERPL-MCNC: 127 MG/DL (ref 65–99)
HCT VFR BLD AUTO: 44.2 % (ref 34.8–46.1)
HGB BLD-MCNC: 14.5 G/DL (ref 11.5–15.4)
IMM GRANULOCYTES # BLD AUTO: 0.02 THOUSAND/UL (ref 0–0.2)
IMM GRANULOCYTES NFR BLD AUTO: 0 % (ref 0–2)
LYMPHOCYTES # BLD AUTO: 2.35 THOUSANDS/ΜL (ref 0.6–4.47)
LYMPHOCYTES NFR BLD AUTO: 36 % (ref 14–44)
MCH RBC QN AUTO: 32.5 PG (ref 26.8–34.3)
MCHC RBC AUTO-ENTMCNC: 32.8 G/DL (ref 31.4–37.4)
MCV RBC AUTO: 99 FL (ref 82–98)
MONOCYTES # BLD AUTO: 0.45 THOUSAND/ΜL (ref 0.17–1.22)
MONOCYTES NFR BLD AUTO: 7 % (ref 4–12)
NEUTROPHILS # BLD AUTO: 3.34 THOUSANDS/ΜL (ref 1.85–7.62)
NEUTS SEG NFR BLD AUTO: 52 % (ref 43–75)
NRBC BLD AUTO-RTO: 0 /100 WBCS
PLATELET # BLD AUTO: 229 THOUSANDS/UL (ref 149–390)
PMV BLD AUTO: 11.1 FL (ref 8.9–12.7)
POTASSIUM SERPL-SCNC: 4.2 MMOL/L (ref 3.5–5.3)
PROT SERPL-MCNC: 7.5 G/DL (ref 6.4–8.4)
RBC # BLD AUTO: 4.46 MILLION/UL (ref 3.81–5.12)
SODIUM SERPL-SCNC: 138 MMOL/L (ref 135–147)
URATE SERPL-MCNC: 6 MG/DL (ref 2–7.5)
WBC # BLD AUTO: 6.49 THOUSAND/UL (ref 4.31–10.16)

## 2022-07-29 PROCEDURE — 84550 ASSAY OF BLOOD/URIC ACID: CPT

## 2022-07-29 PROCEDURE — 85025 COMPLETE CBC W/AUTO DIFF WBC: CPT

## 2022-07-29 PROCEDURE — 80053 COMPREHEN METABOLIC PANEL: CPT

## 2022-07-29 PROCEDURE — 36415 COLL VENOUS BLD VENIPUNCTURE: CPT

## 2023-01-09 ENCOUNTER — APPOINTMENT (OUTPATIENT)
Dept: LAB | Facility: MEDICAL CENTER | Age: 78
End: 2023-01-09

## 2023-01-09 DIAGNOSIS — E55.9 VITAMIN D DEFICIENCY DISEASE: ICD-10-CM

## 2023-01-09 DIAGNOSIS — I10 HYPERTENSION, UNSPECIFIED TYPE: ICD-10-CM

## 2023-01-09 DIAGNOSIS — E78.5 HYPERLIPIDEMIA, UNSPECIFIED HYPERLIPIDEMIA TYPE: ICD-10-CM

## 2023-01-09 DIAGNOSIS — M10.9 GOUT, UNSPECIFIED CAUSE, UNSPECIFIED CHRONICITY, UNSPECIFIED SITE: ICD-10-CM

## 2023-01-09 DIAGNOSIS — E03.9 HYPOTHYROIDISM, UNSPECIFIED TYPE: ICD-10-CM

## 2023-01-09 DIAGNOSIS — E11.9 TYPE 2 DIABETES MELLITUS WITHOUT COMPLICATION, UNSPECIFIED WHETHER LONG TERM INSULIN USE (HCC): ICD-10-CM

## 2023-01-09 LAB
25(OH)D3 SERPL-MCNC: 41.2 NG/ML (ref 30–100)
ALBUMIN SERPL BCP-MCNC: 3.7 G/DL (ref 3.5–5)
ALP SERPL-CCNC: 51 U/L (ref 46–116)
ALT SERPL W P-5'-P-CCNC: 39 U/L (ref 12–78)
ANION GAP SERPL CALCULATED.3IONS-SCNC: 6 MMOL/L (ref 4–13)
AST SERPL W P-5'-P-CCNC: 22 U/L (ref 5–45)
BASOPHILS # BLD AUTO: 0.04 THOUSANDS/ÂΜL (ref 0–0.1)
BASOPHILS NFR BLD AUTO: 1 % (ref 0–1)
BILIRUB SERPL-MCNC: 0.4 MG/DL (ref 0.2–1)
BUN SERPL-MCNC: 14 MG/DL (ref 5–25)
CALCIUM SERPL-MCNC: 9.2 MG/DL (ref 8.3–10.1)
CHLORIDE SERPL-SCNC: 106 MMOL/L (ref 96–108)
CHOLEST SERPL-MCNC: 169 MG/DL
CO2 SERPL-SCNC: 26 MMOL/L (ref 21–32)
CREAT SERPL-MCNC: 0.76 MG/DL (ref 0.6–1.3)
EOSINOPHIL # BLD AUTO: 0.19 THOUSAND/ÂΜL (ref 0–0.61)
EOSINOPHIL NFR BLD AUTO: 4 % (ref 0–6)
ERYTHROCYTE [DISTWIDTH] IN BLOOD BY AUTOMATED COUNT: 13.2 % (ref 11.6–15.1)
EST. AVERAGE GLUCOSE BLD GHB EST-MCNC: 128 MG/DL
GFR SERPL CREATININE-BSD FRML MDRD: 76 ML/MIN/1.73SQ M
GLUCOSE P FAST SERPL-MCNC: 125 MG/DL (ref 65–99)
HBA1C MFR BLD: 6.1 %
HCT VFR BLD AUTO: 43 % (ref 34.8–46.1)
HDLC SERPL-MCNC: 41 MG/DL
HGB BLD-MCNC: 14.2 G/DL (ref 11.5–15.4)
IMM GRANULOCYTES # BLD AUTO: 0.02 THOUSAND/UL (ref 0–0.2)
IMM GRANULOCYTES NFR BLD AUTO: 0 % (ref 0–2)
LDLC SERPL CALC-MCNC: 106 MG/DL (ref 0–100)
LYMPHOCYTES # BLD AUTO: 2.02 THOUSANDS/ÂΜL (ref 0.6–4.47)
LYMPHOCYTES NFR BLD AUTO: 39 % (ref 14–44)
MCH RBC QN AUTO: 32.4 PG (ref 26.8–34.3)
MCHC RBC AUTO-ENTMCNC: 33 G/DL (ref 31.4–37.4)
MCV RBC AUTO: 98 FL (ref 82–98)
MONOCYTES # BLD AUTO: 0.33 THOUSAND/ÂΜL (ref 0.17–1.22)
MONOCYTES NFR BLD AUTO: 6 % (ref 4–12)
NEUTROPHILS # BLD AUTO: 2.64 THOUSANDS/ÂΜL (ref 1.85–7.62)
NEUTS SEG NFR BLD AUTO: 50 % (ref 43–75)
NONHDLC SERPL-MCNC: 128 MG/DL
NRBC BLD AUTO-RTO: 0 /100 WBCS
PLATELET # BLD AUTO: 221 THOUSANDS/UL (ref 149–390)
PMV BLD AUTO: 11.5 FL (ref 8.9–12.7)
POTASSIUM SERPL-SCNC: 3.9 MMOL/L (ref 3.5–5.3)
PROT SERPL-MCNC: 7.3 G/DL (ref 6.4–8.4)
RBC # BLD AUTO: 4.38 MILLION/UL (ref 3.81–5.12)
SODIUM SERPL-SCNC: 138 MMOL/L (ref 135–147)
T4 FREE SERPL-MCNC: 0.91 NG/DL (ref 0.76–1.46)
TRIGL SERPL-MCNC: 109 MG/DL
TSH SERPL DL<=0.05 MIU/L-ACNC: 1.7 UIU/ML (ref 0.45–4.5)
URATE SERPL-MCNC: 5.3 MG/DL (ref 2–7.5)
WBC # BLD AUTO: 5.24 THOUSAND/UL (ref 4.31–10.16)

## 2023-01-26 ENCOUNTER — TELEPHONE (OUTPATIENT)
Dept: GYNECOLOGY | Facility: CLINIC | Age: 78
End: 2023-01-26

## 2023-01-26 DIAGNOSIS — Z12.31 ENCOUNTER FOR SCREENING MAMMOGRAM FOR MALIGNANT NEOPLASM OF BREAST: Primary | ICD-10-CM

## 2023-01-30 ENCOUNTER — HOSPITAL ENCOUNTER (OUTPATIENT)
Dept: RADIOLOGY | Facility: CLINIC | Age: 78
Discharge: HOME | End: 2023-01-30

## 2023-01-30 ENCOUNTER — HOSPITAL ENCOUNTER (OUTPATIENT)
Dept: NON INVASIVE DIAGNOSTICS | Facility: HOSPITAL | Age: 78
Discharge: HOME | End: 2023-01-30

## 2023-01-30 VITALS — HEIGHT: 67 IN | BODY MASS INDEX: 33.27 KG/M2 | WEIGHT: 212 LBS

## 2023-01-30 DIAGNOSIS — I25.10 CVD (CARDIOVASCULAR DISEASE): ICD-10-CM

## 2023-01-30 DIAGNOSIS — Z12.31 ENCOUNTER FOR SCREENING MAMMOGRAM FOR MALIGNANT NEOPLASM OF BREAST: ICD-10-CM

## 2023-03-09 LAB
LEFT EYE DIABETIC RETINOPATHY: NORMAL
RIGHT EYE DIABETIC RETINOPATHY: NORMAL
SEVERITY (EYE EXAM): NORMAL

## 2023-04-05 ENCOUNTER — TELEPHONE (OUTPATIENT)
Dept: NEPHROLOGY | Facility: CLINIC | Age: 78
End: 2023-04-05

## 2023-04-05 DIAGNOSIS — N20.0 NEPHROLITHIASIS: Primary | ICD-10-CM

## 2023-04-13 PROBLEM — R80.9 PROTEINURIA: Status: ACTIVE | Noted: 2023-04-13

## 2023-04-13 PROBLEM — E66.01 MORBID (SEVERE) OBESITY DUE TO EXCESS CALORIES (HCC): Status: ACTIVE | Noted: 2023-04-13

## 2023-04-13 PROBLEM — R73.03 PREDIABETES: Status: ACTIVE | Noted: 2023-04-13

## 2023-05-01 ENCOUNTER — APPOINTMENT (OUTPATIENT)
Dept: LAB | Facility: MEDICAL CENTER | Age: 78
End: 2023-05-01

## 2023-05-01 DIAGNOSIS — N20.0 NEPHROLITHIASIS: ICD-10-CM

## 2023-05-01 DIAGNOSIS — E11.9 TYPE 2 DIABETES MELLITUS WITHOUT COMPLICATION, UNSPECIFIED WHETHER LONG TERM INSULIN USE (HCC): ICD-10-CM

## 2023-05-01 DIAGNOSIS — R80.9 PROTEINURIA, UNSPECIFIED TYPE: ICD-10-CM

## 2023-05-01 DIAGNOSIS — N28.9 RENAL DYSFUNCTION: ICD-10-CM

## 2023-05-01 LAB
ALBUMIN SERPL BCP-MCNC: 3.8 G/DL (ref 3.5–5)
ALP SERPL-CCNC: 48 U/L (ref 46–116)
ALT SERPL W P-5'-P-CCNC: 34 U/L (ref 12–78)
ANION GAP SERPL CALCULATED.3IONS-SCNC: 3 MMOL/L (ref 4–13)
AST SERPL W P-5'-P-CCNC: 31 U/L (ref 5–45)
BILIRUB SERPL-MCNC: 0.57 MG/DL (ref 0.2–1)
BUN SERPL-MCNC: 20 MG/DL (ref 5–25)
CALCIUM SERPL-MCNC: 10.3 MG/DL (ref 8.3–10.1)
CHLORIDE SERPL-SCNC: 103 MMOL/L (ref 96–108)
CO2 SERPL-SCNC: 29 MMOL/L (ref 21–32)
CREAT SERPL-MCNC: 0.97 MG/DL (ref 0.6–1.3)
GFR SERPL CREATININE-BSD FRML MDRD: 56 ML/MIN/1.73SQ M
GLUCOSE P FAST SERPL-MCNC: 115 MG/DL (ref 65–99)
POTASSIUM SERPL-SCNC: 4 MMOL/L (ref 3.5–5.3)
PROT SERPL-MCNC: 7.4 G/DL (ref 6.4–8.4)
SODIUM SERPL-SCNC: 135 MMOL/L (ref 135–147)

## 2023-05-02 DIAGNOSIS — E83.52 HYPERCALCEMIA: Primary | ICD-10-CM

## 2023-05-03 LAB
EST. AVERAGE GLUCOSE BLD GHB EST-MCNC: 120 MG/DL
HBA1C MFR BLD: 5.8 %

## 2023-05-15 ENCOUNTER — HOSPITAL ENCOUNTER (OUTPATIENT)
Dept: ULTRASOUND IMAGING | Facility: HOSPITAL | Age: 78
Discharge: HOME | End: 2023-05-15

## 2023-05-15 DIAGNOSIS — N20.0 NEPHROLITHIASIS: ICD-10-CM

## 2023-05-15 DIAGNOSIS — N28.1 RENAL CYST: ICD-10-CM

## 2023-05-26 ENCOUNTER — OFFICE VISIT (OUTPATIENT)
Dept: UROLOGY | Facility: CLINIC | Age: 78
End: 2023-05-26
Payer: MEDICARE

## 2023-05-26 VITALS
WEIGHT: 215 LBS | BODY MASS INDEX: 33.74 KG/M2 | HEIGHT: 67 IN | HEART RATE: 62 BPM | DIASTOLIC BLOOD PRESSURE: 78 MMHG | SYSTOLIC BLOOD PRESSURE: 118 MMHG | OXYGEN SATURATION: 97 %

## 2023-05-26 DIAGNOSIS — N28.1 RENAL CYST: Primary | ICD-10-CM

## 2023-05-26 DIAGNOSIS — N20.0 NEPHROLITHIASIS: ICD-10-CM

## 2023-05-26 PROCEDURE — 99213 OFFICE O/P EST LOW 20 MIN: CPT | Performed by: PHYSICIAN ASSISTANT

## 2023-05-26 RX ORDER — DOXYCYCLINE HYCLATE 100 MG/1
CAPSULE ORAL
COMMUNITY
Start: 2023-03-27

## 2023-05-26 RX ORDER — METHYLPREDNISOLONE 4 MG/1
TABLET ORAL
COMMUNITY
Start: 2023-01-11

## 2023-05-26 RX ORDER — MELOXICAM 7.5 MG/1
7.5 TABLET ORAL DAILY
COMMUNITY
Start: 2022-08-05 | End: 2023-08-05

## 2023-05-26 RX ORDER — VARENICLINE TARTRATE 0.5 MG/1
TABLET, FILM COATED ORAL EVERY 12 HOURS
COMMUNITY

## 2023-05-26 NOTE — PROGRESS NOTES
"5/26/2023      Chief Complaint   Patient presents with   • Follow-up     Renal cyst, AML, nephrolithiasis         Assessment and Plan    70 y o  female managed by Dr Alex Grande    1  Right renal cyst  2  Right renal angiomyolipoma  3  Left renal midpole calculus    Malgorzata De Santiago is doing well will continue surveillance of her incidental stone and subcentimeter renal cyst and AML as above  US in 1 year  History of Present Illness  Kaleb Scott is a 70 y o  female here with her  for evaluation of annual follow-up nephrolithiasis and renal cyst   She has a nonobstructing left-sided midpole renal calculus present on imaging since 2009 ranging in measurement from 3 to 8 mm KUB favoring smaller measurement 3mm  She has never had a stone episode or required any stone surgery in the past   She has been counseled primarily on stone prevention  She has no bothersome voiding symptoms she has never had hematuria  Ultrasounds have also demonstrated a subcentimeter simple renal cysts and a subcentimeter probable angiomyolipoma both on the right side  These have also shown stability over the years with ultrasound  Review of Systems   Constitutional: Negative  Respiratory: Negative  Cardiovascular: Negative  Genitourinary: Negative for decreased urine volume, difficulty urinating, dysuria, flank pain, frequency, hematuria, pelvic pain and urgency  Musculoskeletal: Negative  Vitals  Vitals:    05/26/23 0836   BP: 118/78   BP Location: Left arm   Patient Position: Sitting   Cuff Size: Adult   Pulse: 62   SpO2: 97%   Weight: 97 5 kg (215 lb)   Height: 5' 7\" (1 702 m)       Physical Exam  Vitals and nursing note reviewed  Constitutional:       General: She is not in acute distress  Appearance: She is well-developed  She is not diaphoretic  HENT:      Head: Normocephalic and atraumatic  Pulmonary:      Effort: Pulmonary effort is normal    Musculoskeletal:      Right lower leg: No edema       " Left lower leg: No edema  Skin:     General: Skin is warm and dry  Neurological:      Mental Status: She is alert and oriented to person, place, and time        Gait: Gait normal    Psychiatric:         Speech: Speech normal          Behavior: Behavior normal            Past History  Past Medical History:   Diagnosis Date   • Allergic rhinitis    • Anxiety    • Disease of thyroid gland    • Dry eye    • Hypertension      Social History     Socioeconomic History   • Marital status: /Civil Union     Spouse name: None   • Number of children: None   • Years of education: None   • Highest education level: None   Occupational History   • None   Tobacco Use   • Smoking status: Never   • Smokeless tobacco: Never   Vaping Use   • Vaping Use: Never used   Substance and Sexual Activity   • Alcohol use: No     Comment: rarely   • Drug use: No   • Sexual activity: Yes     Partners: Male     Birth control/protection: Post-menopausal   Other Topics Concern   • None   Social History Narrative   • None     Social Determinants of Health     Financial Resource Strain: Not on file   Food Insecurity: Not on file   Transportation Needs: Not on file   Physical Activity: Not on file   Stress: Not on file   Social Connections: Not on file   Intimate Partner Violence: Not on file   Housing Stability: Not on file     Social History     Tobacco Use   Smoking Status Never   Smokeless Tobacco Never     Family History   Problem Relation Age of Onset   • Heart disease Mother    • Heart disease Father    • Diabetes Father    • Heart disease Sister    • Diabetes Sister    • No Known Problems Daughter    • No Known Problems Maternal Grandmother    • No Known Problems Maternal Grandfather    • No Known Problems Paternal Grandmother    • No Known Problems Paternal Grandfather    • No Known Problems Daughter    • No Known Problems Maternal Aunt    • No Known Problems Paternal Aunt    • No Known Problems Paternal Aunt    • No Known Problems "Paternal Aunt    • No Known Problems Paternal Aunt    • Heart disease Brother        The following portions of the patient's history were reviewed and updated as appropriate: allergies, current medications, past medical history, past social history, past surgical history and problem list     Results  No results found for this or any previous visit (from the past 1 hour(s))  ]  No results found for: \"PSA\"  Lab Results   Component Value Date    BUN 20 05/01/2023    CALCIUM 10 3 (H) 05/01/2023     05/01/2023    CO2 29 05/01/2023    CREATININE 0 97 05/01/2023    K 4 0 05/01/2023     Lab Results   Component Value Date    HCT 43 0 01/09/2023    HGB 14 2 01/09/2023    MCV 98 01/09/2023     01/09/2023    WBC 5 24 01/09/2023       "

## 2023-07-05 ENCOUNTER — TELEPHONE (OUTPATIENT)
Dept: FAMILY MEDICINE CLINIC | Facility: CLINIC | Age: 78
End: 2023-07-05

## 2023-07-05 DIAGNOSIS — E03.9 ACQUIRED HYPOTHYROIDISM: ICD-10-CM

## 2023-07-05 DIAGNOSIS — R73.03 PREDIABETES: Primary | ICD-10-CM

## 2023-07-05 DIAGNOSIS — E78.2 MIXED HYPERLIPIDEMIA: ICD-10-CM

## 2023-07-05 NOTE — TELEPHONE ENCOUNTER
Chino Stack called stating she lost her lab slip. Would like it entered into Epic so she can have it drawn.  [phone]

## 2023-07-10 ENCOUNTER — RA CDI HCC (OUTPATIENT)
Dept: OTHER | Facility: HOSPITAL | Age: 78
End: 2023-07-10

## 2023-07-10 NOTE — PROGRESS NOTES
e11.22  [address] [facility] coding opportunities          Chart Reviewed number of suggestions sent to Provider: 1     Patients Insurance     Medicare Insurance: [employer]

## 2023-07-13 ENCOUNTER — APPOINTMENT (OUTPATIENT)
Dept: LAB | Facility: MEDICAL CENTER | Age: 78
End: 2023-07-13
Payer: MEDICARE

## 2023-07-13 DIAGNOSIS — R73.03 PREDIABETES: ICD-10-CM

## 2023-07-13 DIAGNOSIS — E83.52 HYPERCALCEMIA: ICD-10-CM

## 2023-07-13 DIAGNOSIS — E78.2 MIXED HYPERLIPIDEMIA: ICD-10-CM

## 2023-07-13 DIAGNOSIS — E03.9 ACQUIRED HYPOTHYROIDISM: ICD-10-CM

## 2023-07-13 LAB
ANION GAP SERPL CALCULATED.3IONS-SCNC: 5 MMOL/L
BUN SERPL-MCNC: 18 MG/DL (ref 5–25)
CALCIUM SERPL-MCNC: 9.6 MG/DL (ref 8.3–10.1)
CHLORIDE SERPL-SCNC: 104 MMOL/L (ref 96–108)
CHOLEST SERPL-MCNC: 172 MG/DL
CO2 SERPL-SCNC: 27 MMOL/L (ref 21–32)
CREAT SERPL-MCNC: 0.98 MG/DL (ref 0.6–1.3)
EST. AVERAGE GLUCOSE BLD GHB EST-MCNC: 126 MG/DL
GFR SERPL CREATININE-BSD FRML MDRD: 56 ML/MIN/1.73SQ M
GLUCOSE P FAST SERPL-MCNC: 130 MG/DL (ref 65–99)
HBA1C MFR BLD: 6 %
HDLC SERPL-MCNC: 37 MG/DL
LDLC SERPL CALC-MCNC: 100 MG/DL (ref 0–100)
NONHDLC SERPL-MCNC: 135 MG/DL
POTASSIUM SERPL-SCNC: 4.4 MMOL/L (ref 3.5–5.3)
SODIUM SERPL-SCNC: 136 MMOL/L (ref 135–147)
TRIGL SERPL-MCNC: 173 MG/DL
TSH SERPL DL<=0.05 MIU/L-ACNC: 1.92 UIU/ML (ref 0.45–4.5)

## 2023-07-13 PROCEDURE — 84443 ASSAY THYROID STIM HORMONE: CPT

## 2023-07-13 PROCEDURE — 36415 COLL VENOUS BLD VENIPUNCTURE: CPT

## 2023-07-13 PROCEDURE — 80061 LIPID PANEL: CPT

## 2023-07-13 PROCEDURE — 83036 HEMOGLOBIN GLYCOSYLATED A1C: CPT

## 2023-07-13 PROCEDURE — 80048 BASIC METABOLIC PNL TOTAL CA: CPT

## 2023-07-16 PROBLEM — K44.9 HIATAL HERNIA: Status: ACTIVE | Noted: 2018-06-16

## 2023-07-17 ENCOUNTER — OFFICE VISIT (OUTPATIENT)
Dept: FAMILY MEDICINE CLINIC | Facility: CLINIC | Age: 78
End: 2023-07-17
Payer: MEDICARE

## 2023-07-17 VITALS
WEIGHT: 213.8 LBS | DIASTOLIC BLOOD PRESSURE: 78 MMHG | SYSTOLIC BLOOD PRESSURE: 138 MMHG | HEIGHT: 67 IN | TEMPERATURE: 97.8 F | OXYGEN SATURATION: 99 % | HEART RATE: 72 BPM | BODY MASS INDEX: 33.56 KG/M2

## 2023-07-17 DIAGNOSIS — N18.2 TYPE 2 DIABETES MELLITUS WITH STAGE 2 CHRONIC KIDNEY DISEASE, WITHOUT LONG-TERM CURRENT USE OF INSULIN (HCC): ICD-10-CM

## 2023-07-17 DIAGNOSIS — I67.9 CEREBROVASCULAR DISEASE: ICD-10-CM

## 2023-07-17 DIAGNOSIS — M79.89 LEFT LEG SWELLING: ICD-10-CM

## 2023-07-17 DIAGNOSIS — E78.2 MIXED HYPERLIPIDEMIA: ICD-10-CM

## 2023-07-17 DIAGNOSIS — E11.9 TYPE 2 DIABETES MELLITUS WITHOUT COMPLICATION, WITHOUT LONG-TERM CURRENT USE OF INSULIN (HCC): Primary | ICD-10-CM

## 2023-07-17 DIAGNOSIS — I10 ESSENTIAL HYPERTENSION: ICD-10-CM

## 2023-07-17 DIAGNOSIS — N18.31 STAGE 3A CHRONIC KIDNEY DISEASE (HCC): ICD-10-CM

## 2023-07-17 DIAGNOSIS — E11.22 TYPE 2 DIABETES MELLITUS WITH STAGE 2 CHRONIC KIDNEY DISEASE, WITHOUT LONG-TERM CURRENT USE OF INSULIN (HCC): ICD-10-CM

## 2023-07-17 PROCEDURE — 99214 OFFICE O/P EST MOD 30 MIN: CPT | Performed by: FAMILY MEDICINE

## 2023-07-17 NOTE — ASSESSMENT & PLAN NOTE
Cannot explain worsening and if hemoglobin A1c with home sugars is noted patient really watching diet and exercising after discussing her problems with weight loss and as per advice from her nephrologist will give a trial of Ozempic as long as not too expensive for sugar control and hopefully to help her lose weight.   I did advise she stop her metformin when she begins Ozempic and to call me with sugars after on this new medication for several weeks  Lab Results   Component Value Date    HGBA1C 6.0 (H) 07/13/2023

## 2023-07-17 NOTE — PROGRESS NOTES
Name: Dara Springer      : 1945      MRN: [id number]  Encounter Provider: Dionne Zamudio MD  Encounter Date: 2023   Encounter department: [address] [facility]    Assessment & Plan     1. Type 2 diabetes mellitus without complication, without long-term current use of insulin (HCC)  -     semaglutide, 0.25 or 0.5 mg/dose, (Ozempic, 0.25 or 0.5 MG/DOSE,) 2 mg/3 mL injection pen; Inject 0.38 mL (0.2533 mg total) under the skin every 7 days for 28 days, THEN 0.75 mL (0.5 mg total) every 7 days. 2. Mixed hyperlipidemia  Comments:  Per Braxton calculation she is at 22% risk that does not include a very strong family history of coronary artery disease will obtain calcium score then consi  Orders:  -     CT coronary calcium score; Future; Expected date: 2023    3. Left leg swelling  Comments:  May be due to her varicosities however will check a Doppler and ultrasound of abdomen and pelvis to rule out any abnormalities discussed proper care of varicose  Orders:  -     US abdomen and pelvis with transvaginal; Future; Expected date: 2023    4. Type 2 diabetes mellitus with stage 2 chronic kidney disease, without long-term current use of insulin (HCC)  Assessment & Plan:  Cannot explain worsening and if hemoglobin A1c with home sugars is noted patient really watching diet and exercising after discussing her problems with weight loss and as per advice from her nephrologist will give a trial of Ozempic as long as not too expensive for sugar control and hopefully to help her lose weight. I did advise she stop her metformin when she begins Ozempic and to call me with sugars after on this new medication for several weeks  Lab Results   Component Value Date    HGBA1C 6.0 (H) 2023         5. Stage 3a chronic kidney disease (HCC)  Comments:  Stable per recent labs again reiterated good hydration, no nonsteroidal anti-inflammatories, and she will continue follow-up with nephrology    6. Cerebrovascular disease  Comments:  Up-to-date with dopplers again will discuss need for statin treatment after testing done    7. Essential hypertension  Comments:  Controlled      BMI Counseling: Body mass index is 33.49 kg/m². The BMI is above normal. Nutrition recommendations include encouraging healthy choices of fruits and vegetables, limiting drinks that contain sugar and reducing intake of cholesterol. Exercise recommendations include moderate physical activity 150 minutes/week. Rationale for BMI follow-up plan is due to patient being overweight or obese. Depression Screening and Follow-up Plan: Patient was screened for depression during today's encounter. They screened negative with a PHQ-2 score of 0. Subjective      Patient here for routine visit well but with several questions. She is very concerned about difficulty losing weight and why her hemoglobin A1c has worsened. Blood sugar reports that are basically between 100 & 140 irregardlessof time of day. She is exercising either outside walking or on elliptical and treadmill for 40 to 45 minutes every other day of the week. She is wondering if she should see an endocrinologist.    Patient also concerned about her cardiac status. Both of her parents  relatively young of heart problems. No symptoms whatsoever but is wondering if she needs further testing and/or referral to cardiology. Apparently her  recently saw Maurice Jarrett cardiologist whom she was very impressed with. Patient comes also concerned about chronic swelling of her left leg compared to the right. States not really swollen just bigger than the right. He was evaluated in the past and told to wear compression stockings however causes itchiness. She denies any prior trauma of that leg. Does have bad varicose veins of her left leg compared to her right but states they give her no problems. Review of Systems   Constitutional: Negative for chills and fever. HENT: Negative for ear pain and sore throat. Eyes: Negative for pain and visual disturbance. Respiratory: Negative for cough and shortness of breath. Cardiovascular: Positive for leg swelling. Negative for chest pain and palpitations. Gastrointestinal: Negative for abdominal pain and vomiting. Genitourinary: Negative for dysuria and hematuria. Musculoskeletal: Negative for arthralgias and back pain. Skin: Negative for color change and rash. Neurological: Negative for seizures and syncope. All other systems reviewed and are negative. Current Outpatient Medications on File Prior to Visit   Medication Sig   • allopurinol (ZYLOPRIM) 100 mg tablet    • ascorbic acid (VITAMIN C) 500 mg tablet Take 500 mg by mouth daily   • Brimonidine Tartrate 0.025 % SOLN Apply to eye   • cholecalciferol (VITAMIN D3) 1,000 units tablet Take 2,000 Units by mouth daily   • citalopram (CeleXA) 10 mg tablet Take 10 mg by mouth   • cycloSPORINE (RESTASIS) 0.05 % ophthalmic emulsion 1 drop 2 (two) times a day   • dapagliflozin (Farxiga) 5 MG TABS Take 5 mg by mouth daily   • DOCOSAHEXAENOIC ACID-EPA PO Take by mouth   • doxycycline monohydrate (MONODOX) 100 mg capsule Take 100 mg by mouth   • estradiol (ESTRACE) 0.1 mg/g vaginal cream 1 gram vaginally  weekly   • fluticasone (FLONASE) 50 mcg/act nasal spray    • glucose blood test strip TEST BLOOD SUGAR ONCE DIALY. • levothyroxine 75 mcg tablet    • lisinopril-hydrochlorothiazide (PRINZIDE,ZESTORETIC) 10-12.5 MG per tablet    • meloxicam (MOBIC) 7.5 mg tablet Take 7.5 mg by mouth daily   • metFORMIN (FORTAMET) 1000 MG (OSM) 24 hr tablet    • methylPREDNISolone 4 MG tablet therapy pack Take as directed per package instructions.    • mometasone (ELOCON) 0.1 % cream Apply topically daily Apply topically BID x 2 weeks then qhs prn   • Omega-3 Fatty Acids (FISH OIL) 645 MG CAPS Take by mouth   • Probiotic Product (PROBIOTIC DAILY PO) Take by mouth   • SUMAtriptan (IMITREX) 50 mg tablet Take by mouth   • Turmeric Curcumin 500 MG CAPS Take by mouth   • varenicline (CHANTIX) 0.5 mg tablet Take by mouth every 12 (twelve) hours   • White Petrolatum-Mineral Oil (REFRESH LACRI-LUBE OP) Apply to eye   • clobetasol (TEMOVATE) 0.05 % cream Apply topically 2 (two) times a day for 14 days   • dapagliflozin (Farxiga) 10 MG tablet Take 1 tablet (10 mg total) by mouth daily (Patient not taking: Reported on 7/17/2023)   • fexofenadine (ALLEGRA) 180 MG tablet Take 180 mg by mouth daily   • neomycin-polymyxin-hydrocortisone (CORTISPORIN) 0.35%-10,000 units/mL-1% otic suspension Administer into ears (Patient not taking: Reported on 10/14/2021)   • [DISCONTINUED] dapagliflozin (Farxiga) 5 MG TABS Take by mouth Daily (Patient not taking: Reported on 7/17/2023)   • [DISCONTINUED] doxycycline hyclate (VIBRAMYCIN) 100 mg capsule  (Patient not taking: Reported on 7/17/2023)       Objective     /78 (BP Location: Left arm, Patient Position: Sitting, Cuff Size: Large)   Pulse 72   Temp 97.8 °F (36.6 °C) (Tympanic)   Ht 5' 7" (1.702 m)   Wt 97 kg (213 lb 12.8 oz)   SpO2 99%   BMI 33.49 kg/m²     Physical Exam  Constitutional:       Appearance: Normal appearance. Neck:      Vascular: No carotid bruit. Cardiovascular:      Rate and Rhythm: Normal rate and regular rhythm. Pulses: Normal pulses. Heart sounds: Normal heart sounds. Pulmonary:      Effort: Pulmonary effort is normal.      Breath sounds: Normal breath sounds. Abdominal:      Palpations: Abdomen is soft. There is no mass. Tenderness: There is no abdominal tenderness. Musculoskeletal:      Right lower leg: No edema. Left lower leg: No edema. Comments: Left lower leg larger than right however without pitting edema marked varicosities of left leg compared to right without cord formation or signs of phlebitis. No calf tenderness or Homans' sign is warm to touch and without skin changes.   Peripheral pulses are intact palpate no inguinal nodes or abnormalities ;abdominal and pelvis exam normal   Lymphadenopathy:      Cervical: No cervical adenopathy. Skin:     General: Skin is warm and dry. Neurological:      General: No focal deficit present. Mental Status: She is oriented to person, place, and time.        Kalpana Segovia MD

## 2023-07-24 ENCOUNTER — HOSPITAL ENCOUNTER (OUTPATIENT)
Dept: ULTRASOUND IMAGING | Facility: HOSPITAL | Age: 78
Discharge: HOME | End: 2023-07-24
Payer: MEDICARE

## 2023-07-24 ENCOUNTER — TELEPHONE (OUTPATIENT)
Dept: FAMILY MEDICINE CLINIC | Facility: CLINIC | Age: 78
End: 2023-07-24

## 2023-07-24 DIAGNOSIS — M79.89 LEFT LEG SWELLING: ICD-10-CM

## 2023-07-24 PROCEDURE — 76700 US EXAM ABDOM COMPLETE: CPT

## 2023-07-24 NOTE — TELEPHONE ENCOUNTER
Helen Halon called and said she was reading her after visit summary and she saw that it had  For her to stop the doxycycline. She is questioning that because she said her eye doctor was the one who started her on it.  She just wants clarification

## 2023-07-25 ENCOUNTER — HOSPITAL ENCOUNTER (OUTPATIENT)
Dept: ULTRASOUND IMAGING | Facility: HOSPITAL | Age: 78
Discharge: HOME | End: 2023-07-25
Payer: MEDICARE

## 2023-07-25 ENCOUNTER — TELEPHONE (OUTPATIENT)
Dept: FAMILY MEDICINE CLINIC | Facility: CLINIC | Age: 78
End: 2023-07-25

## 2023-07-25 DIAGNOSIS — M79.89 SWELLING OF LIMB: ICD-10-CM

## 2023-07-25 PROCEDURE — 76856 US EXAM PELVIC COMPLETE: CPT

## 2023-07-25 PROCEDURE — 76830 TRANSVAGINAL US NON-OB: CPT

## 2023-07-25 NOTE — TELEPHONE ENCOUNTER
Iris left a vm earlier ( @ [phone]) - message returned - called at [address] called to let you know that pt's US results are ready for review and it has significant findings. Clin[facility] / provider notified.      Please assist

## 2023-07-26 NOTE — TELEPHONE ENCOUNTER
Spoke with Maral ultrasound of pelvis. Fortunately ultrasound of abdomen still not read. I advised that findings on the pelvis and no way explain the swelling of her leg however due to endometrial thickening I would prefer she follow-up with her gynecologist which is Dr. Artemio Newberry.  Denies any bleeding or GYN problems however she will call him definitely for follow-up.   Advised I will call her back as soon as I get results of the ultrasound of her abdomen and she has her calcium score scheduled for later this week

## 2023-07-28 ENCOUNTER — HOSPITAL ENCOUNTER (OUTPATIENT)
Dept: CT IMAGING | Facility: HOSPITAL | Age: 78
Discharge: HOME | End: 2023-07-28
Payer: COMMERCIAL

## 2023-07-28 ENCOUNTER — TELEPHONE (OUTPATIENT)
Dept: FAMILY MEDICINE CLINIC | Facility: CLINIC | Age: 78
End: 2023-07-28

## 2023-07-28 DIAGNOSIS — E78.2 MIXED HYPERLIPIDEMIA: ICD-10-CM

## 2023-07-28 PROCEDURE — G1004 CDSM NDSC: HCPCS

## 2023-07-28 PROCEDURE — 75571 CT HRT W/O DYE W/CA TEST: CPT

## 2023-08-01 ENCOUNTER — TELEPHONE (OUTPATIENT)
Dept: GYNECOLOGY | Facility: CLINIC | Age: 78
End: 2023-08-01

## 2023-08-01 NOTE — TELEPHONE ENCOUNTER
Patient called and states her PCP ordered a trans vaginal and pelvic ultrasound and she did get results but her PCP would like you to review those results as well. She did say there were no findings but would like 2nd opinion. Please review results in Epic.

## 2023-08-04 ENCOUNTER — APPOINTMENT (OUTPATIENT)
Dept: LAB | Facility: MEDICAL CENTER | Age: 78
End: 2023-08-04
Payer: MEDICARE

## 2023-08-04 DIAGNOSIS — M1A.09X0 IDIOPATHIC CHRONIC GOUT OF MULTIPLE SITES WITHOUT TOPHUS: ICD-10-CM

## 2023-08-04 DIAGNOSIS — E78.2 MIXED HYPERLIPIDEMIA: Primary | ICD-10-CM

## 2023-08-04 LAB
ALBUMIN SERPL BCP-MCNC: 3.6 G/DL (ref 3.5–5)
ALP SERPL-CCNC: 54 U/L (ref 46–116)
ALT SERPL W P-5'-P-CCNC: 35 U/L (ref 12–78)
ANION GAP SERPL CALCULATED.3IONS-SCNC: 6 MMOL/L
AST SERPL W P-5'-P-CCNC: 25 U/L (ref 5–45)
BASOPHILS # BLD AUTO: 0.04 THOUSANDS/ÂΜL (ref 0–0.1)
BASOPHILS NFR BLD AUTO: 1 % (ref 0–1)
BILIRUB SERPL-MCNC: 0.52 MG/DL (ref 0.2–1)
BUN SERPL-MCNC: 19 MG/DL (ref 5–25)
CALCIUM SERPL-MCNC: 9.5 MG/DL (ref 8.3–10.1)
CHLORIDE SERPL-SCNC: 107 MMOL/L (ref 96–108)
CO2 SERPL-SCNC: 28 MMOL/L (ref 21–32)
CREAT SERPL-MCNC: 0.95 MG/DL (ref 0.6–1.3)
CRP SERPL QL: 4.1 MG/L
EOSINOPHIL # BLD AUTO: 0.2 THOUSAND/ÂΜL (ref 0–0.61)
EOSINOPHIL NFR BLD AUTO: 4 % (ref 0–6)
ERYTHROCYTE [DISTWIDTH] IN BLOOD BY AUTOMATED COUNT: 13.1 % (ref 11.6–15.1)
ERYTHROCYTE [SEDIMENTATION RATE] IN BLOOD: 11 MM/HOUR (ref 0–29)
GFR SERPL CREATININE-BSD FRML MDRD: 58 ML/MIN/1.73SQ M
GLUCOSE P FAST SERPL-MCNC: 124 MG/DL (ref 65–99)
HCT VFR BLD AUTO: 44.7 % (ref 34.8–46.1)
HGB BLD-MCNC: 14.4 G/DL (ref 11.5–15.4)
IMM GRANULOCYTES # BLD AUTO: 0.02 THOUSAND/UL (ref 0–0.2)
IMM GRANULOCYTES NFR BLD AUTO: 0 % (ref 0–2)
LYMPHOCYTES # BLD AUTO: 2.19 THOUSANDS/ÂΜL (ref 0.6–4.47)
LYMPHOCYTES NFR BLD AUTO: 40 % (ref 14–44)
MCH RBC QN AUTO: 32 PG (ref 26.8–34.3)
MCHC RBC AUTO-ENTMCNC: 32.2 G/DL (ref 31.4–37.4)
MCV RBC AUTO: 99 FL (ref 82–98)
MONOCYTES # BLD AUTO: 0.41 THOUSAND/ÂΜL (ref 0.17–1.22)
MONOCYTES NFR BLD AUTO: 8 % (ref 4–12)
NEUTROPHILS # BLD AUTO: 2.64 THOUSANDS/ÂΜL (ref 1.85–7.62)
NEUTS SEG NFR BLD AUTO: 47 % (ref 43–75)
NRBC BLD AUTO-RTO: 0 /100 WBCS
PLATELET # BLD AUTO: 238 THOUSANDS/UL (ref 149–390)
PMV BLD AUTO: 11 FL (ref 8.9–12.7)
POTASSIUM SERPL-SCNC: 3.9 MMOL/L (ref 3.5–5.3)
PROT SERPL-MCNC: 7.5 G/DL (ref 6.4–8.4)
RBC # BLD AUTO: 4.5 MILLION/UL (ref 3.81–5.12)
SODIUM SERPL-SCNC: 141 MMOL/L (ref 135–147)
URATE SERPL-MCNC: 4.1 MG/DL (ref 2–7.5)
WBC # BLD AUTO: 5.5 THOUSAND/UL (ref 4.31–10.16)

## 2023-08-04 PROCEDURE — 85652 RBC SED RATE AUTOMATED: CPT

## 2023-08-04 PROCEDURE — 85025 COMPLETE CBC W/AUTO DIFF WBC: CPT

## 2023-08-04 PROCEDURE — 86140 C-REACTIVE PROTEIN: CPT

## 2023-08-04 PROCEDURE — 84550 ASSAY OF BLOOD/URIC ACID: CPT

## 2023-08-04 PROCEDURE — 80053 COMPREHEN METABOLIC PANEL: CPT

## 2023-08-04 PROCEDURE — 36415 COLL VENOUS BLD VENIPUNCTURE: CPT

## 2023-08-04 RX ORDER — ATORVASTATIN CALCIUM 20 MG/1
20 TABLET, FILM COATED ORAL DAILY
Qty: 30 TABLET | Refills: 5 | Status: SHIPPED | OUTPATIENT
Start: 2023-08-04

## 2023-08-11 ENCOUNTER — TELEPHONE (OUTPATIENT)
Dept: FAMILY MEDICINE CLINIC | Facility: CLINIC | Age: 78
End: 2023-08-11

## 2023-08-11 NOTE — TELEPHONE ENCOUNTER
----- Message from Gianfranco Shrestha MD sent at 8/11/2023  2:46 PM EDT -----  Can not read    please jaycob or pipo ? ?  And rescan   thanks  ----- Message -----  From: Ratna Prescott  Sent: 8/11/2023   1:43 PM EDT  To: Gianfranco Shrestha MD    rescanned

## 2023-08-15 ENCOUNTER — TELEPHONE (OUTPATIENT)
Dept: FAMILY MEDICINE CLINIC | Facility: CLINIC | Age: 78
End: 2023-08-15

## 2023-08-15 NOTE — TELEPHONE ENCOUNTER
Spoke to pt she was concerned with her Ozempic. States she has read every where that you are suppose to increase to the 0.5 mg dose after 4 weeks so was not sure why she was told to stay on the 0.25 mg. I spoke to Dr. Ryan Cox and due to her sugar readings being good she did not want pt to increase in case they would drop too low. Pt is to continue 0.25 mg dose and call in a couple weeks with more readings. Pt aware.

## 2023-08-15 NOTE — TELEPHONE ENCOUNTER
Luis Garcia left a voicemail stating she needed to speak to a nurse about her medications.  She can be reached at [phone]

## 2023-08-16 ENCOUNTER — ULTRASOUND (OUTPATIENT)
Dept: GYNECOLOGY | Facility: CLINIC | Age: 78
End: 2023-08-16

## 2023-08-16 ENCOUNTER — OFFICE VISIT (OUTPATIENT)
Dept: GYNECOLOGY | Facility: CLINIC | Age: 78
End: 2023-08-16

## 2023-08-16 DIAGNOSIS — R93.89 THICKENED ENDOMETRIUM: Primary | ICD-10-CM

## 2023-08-16 DIAGNOSIS — N84.0 ENDOMETRIAL POLYP: Primary | ICD-10-CM

## 2023-08-16 DIAGNOSIS — Z01.818 PRE-OP TESTING: ICD-10-CM

## 2023-08-16 PROCEDURE — 88305 TISSUE EXAM BY PATHOLOGIST: CPT | Performed by: PATHOLOGY

## 2023-08-16 NOTE — PROGRESS NOTES
AMB US Pelvic Non OB    Date/Time: 8/16/2023 12:30 PM    Performed by: Socorro Osullivan  Authorized by: Elyn Epley, DO  Universal Protocol:  Consent given by: patient  Patient understanding: patient states understanding of the procedure being performed  Patient identity confirmed: verbally with patient      Procedure details:     Technique:  Transvaginal US, Non-OB    Position: lithotomy exam    Uterine findings:     Length (cm): 7.09    Height (cm):  4.13    Width (cm):  5.99    Endometrial stripe: identified      Endometrium thickness (mm):  8.41  Left ovary findings:     Left ovary:  Not visualized  Right ovary findings:     Right ovary:  Not visualized  Other findings:     Free pelvic fluid: not identified      Free peritoneal fluid: not identified    Post-Procedure Details:     Impression:  Anteverted uterus is inhomogeneous throughout without fibroids. The endometrium is thickened for post menopausal patient. The bilateral ovaries are not seen, likely due to atrophic changes along with overlying bowel gas. One ovary has been removed, however patient is uncertain which one No free fluid. Tolerance: Tolerated well, no immediate complications    Complications: no complications    Additional Procedure Comments:      GE Voluson P8 transvaginal transducer RIC5-RA with Serial Number [id number] was used during procedure and subsequently cleaned with high level disinfection utilizing the [employer].      Ultrasound performed at:     [id number] [facility] [address]  [facility], [address] [place] [address]  Phone: [phone]  Fax:  [phone]    Sonohysterogram    Date/Time: 8/16/2023 12:30 PM    Performed by: Elyn Epley, DO  Authorized by: Elyn Epley, DO  Universal Protocol:  Consent given by: patient  Patient understanding: patient states understanding of the procedure being performed  Patient identity confirmed: verbally with patient      Pre-procedure:     Prepped with: Betadine    Procedure:     Cervix cleaned and prepped: yes      Uterus sounded: yes      Catheter inserted: yes      Uterine cavity distended with saline: yes    Post-procedure:     Patient observed: yes      Post procedure instructions given to patient: yes      Patient tolerated procedure well with no complications: yes    Comments:      Sonohysterogram demonstrates a 1.8cm polyp within the endometrium.    Endometrial biopsy    Date/Time: 8/16/2023 12:30 PM    Performed by: Milton Vargas DO  Authorized by: Milton Vargas DO  Universal Protocol:  Consent given by: patient  Patient understanding: patient states understanding of the procedure being performed  Patient identity confirmed: verbally with patient      Procedure:     Procedure: endometrial biopsy with Pipelle      A bivalve speculum was placed in the vagina: yes      Cervix cleaned and prepped: yes      Specimen collected: specimen collected and sent to pathology      Patient tolerated procedure well with no complications: yes

## 2023-08-16 NOTE — PROGRESS NOTES
Assessment/Plan:       Diagnoses and all orders for this visit:    Endometrial polyp: Reviewed ultrasound findings with patient. Plan is to proceed with a hysteroscopy polypectomy. Procedure discussed        Subjective:      Patient ID: Zac Mccallum is a 70 y.o. female. HPI patient recently had a ultrasound done secondary to some lower extremity swelling. The ultrasound revealed a thickened endometrium at 6 mm. Uterus measured 6.7 x 4.2 x 6.1 cm. The patient's had a prior RSO. Patient presented to our office for TVS/SIS/possible endometrial biopsy;  AMB US Pelvic Non OB     Date/Time: 8/16/2023 12:30 PM     Performed by: Sen Kebede  Authorized by: Marcella Brown DO  Universal Protocol:  Consent given by: patient  Patient understanding: patient states understanding of the procedure being performed  Patient identity confirmed: verbally with patient        Procedure details:     Technique:  Transvaginal US, Non-OB    Position: lithotomy exam    Uterine findings:     Length (cm): 7.09    Height (cm):  4.13    Width (cm):  5.99    Endometrial stripe: identified      Endometrium thickness (mm):  8.41  Left ovary findings:     Left ovary:  Not visualized  Right ovary findings:     Right ovary:  Not visualized  Other findings:     Free pelvic fluid: not identified      Free peritoneal fluid: not identified    Post-Procedure Details:     Impression:  Anteverted uterus is inhomogeneous throughout without fibroids. The endometrium is thickened for post menopausal patient. The bilateral ovaries are not seen, likely due to atrophic changes along with overlying bowel gas. One ovary has been removed, however patient is uncertain which one No free fluid. Tolerance:   Tolerated well, no immediate complications    Complications: no complications    Additional Procedure Comments:      GE Voluson P8 transvaginal transducer RIC5-RA with Serial Number [id number] was used during procedure and subsequently cleaned with high level disinfection utilizing the [employer].      Ultrasound performed at:   [address] [facility] [address] [facility]  [address]  [address] [facility] [facility] [facility]  Phone: [phone]  Fax:  [phone]     Sonohysterogram     Date/Time: 8/16/2023 12:30 PM     Performed by: Gato Gallardo DO  Authorized by: Gato Gallardo DO  Universal Protocol:  Consent given by: patient  Patient understanding: patient states understanding of the procedure being performed  Patient identity confirmed: verbally with patient        Pre-procedure:     Prepped with: Betadine    Procedure:     Cervix cleaned and prepped: yes      Uterus sounded: yes      Catheter inserted: yes      Uterine cavity distended with saline: yes    Post-procedure:     Patient observed: yes      Post procedure instructions given to patient: yes      Patient tolerated procedure well with no complications: yes    Comments:      Sonohysterogram demonstrates a 1.8cm polyp within the endometrium.    Endometrial biopsy     Date/Time: 8/16/2023 12:30 PM     Performed by: Gato Gallardo DO  Authorized by: Gato Gallardo DO  Universal Protocol:  Consent given by: patient  Patient understanding: patient states understanding of the procedure being performed  Patient identity confirmed: verbally with patient        Procedure:     Procedure: endometrial biopsy with Pipelle      A bivalve speculum was placed in the vagina: yes      Cervix cleaned and prepped: yes      Specimen collected: specimen collected and sent to pathology      Patient tolerated procedure well with no complications: yes           The following portions of the patient's history were reviewed and updated as appropriate:   She  has a past medical history of Allergic rhinitis, Anxiety, Celiac disease, Cerebrovascular disease, Colon polyp, Diabetes mellitus ([address]), Disease of thyroid gland, Diverticulosis of colon, Dry eye, Gout, Hiatal hernia, Hypertension, Hypothyroidism, Nephrolithiasis, Perennial allergic rhinitis, Peripheral venous insufficiency, Rosacea conjunctivitis, bilateral, Sleep apnea, and Vitamin D deficiency. She   Patient Active Problem List    Diagnosis Date Noted   • Type 2 diabetes mellitus with stage 2 chronic kidney disease, without long-term current use of insulin ([address]) 07/17/2023   • Stage 3a chronic kidney disease ([address]) 07/17/2023   • Proteinuria 04/13/2023   • Prediabetes 04/13/2023   • Morbid (severe) obesity due to excess calories ([address]) 04/13/2023   • Renal cyst 11/18/2021   • 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 12/02/2019   • Varicose veins of both lower extremities with complications 40/19/6087   • DEEPAK on CPAP 08/08/2019   • Nephrolithiasis 01/25/2019   • Hiatal hernia 06/16/2018   • Polyp of colon 08/16/2015   • Diverticulosis of colon 07/16/2015   • Celiac disease 05/15/2014   • Cerebrovascular disease 05/15/2014   • Essential hypertension 05/15/2014   • Gout 05/15/2014   • Hypothyroidism 05/15/2014   • Type 2 diabetes mellitus ([address]) 05/15/2014   • Vitamin D deficiency 05/15/2014     She  has a past surgical history that includes Cholecystectomy; Varicose vein surgery; Tonsillectomy; and Cataract extraction, bilateral (01/2020). Her family history includes Diabetes in her father and sister; Heart disease in her brother, father, mother, and sister; No Known Problems in her daughter, daughter, maternal aunt, maternal grandfather, maternal grandmother, paternal aunt, paternal aunt, paternal aunt, paternal aunt, paternal grandfather, and paternal grandmother. She  reports that she has never smoked. She has never used smokeless tobacco. She reports that she does not drink alcohol and does not use drugs.   Current Outpatient Medications   Medication Sig Dispense Refill   • allopurinol (ZYLOPRIM) 100 mg tablet      • ascorbic acid (VITAMIN C) 500 mg tablet Take 500 mg by mouth daily     • atorvastatin (LIPITOR) 20 mg tablet Take 1 tablet (20 mg total) by mouth daily 30 tablet 5   • Brimonidine Tartrate 0.025 % SOLN Apply to eye     • cholecalciferol (VITAMIN D3) 1,000 units tablet Take 2,000 Units by mouth daily     • citalopram (CeleXA) 10 mg tablet Take 10 mg by mouth  3   • clobetasol (TEMOVATE) 0.05 % cream Apply topically 2 (two) times a day for 14 days 30 g 3   • cycloSPORINE (RESTASIS) 0.05 % ophthalmic emulsion 1 drop 2 (two) times a day     • dapagliflozin (Farxiga) 10 MG tablet Take 1 tablet (10 mg total) by mouth daily (Patient not taking: Reported on 7/17/2023) 90 tablet 3   • dapagliflozin (Farxiga) 5 MG TABS Take 5 mg by mouth daily     • DOCOSAHEXAENOIC ACID-EPA PO Take by mouth     • doxycycline monohydrate (MONODOX) 100 mg capsule Take 100 mg by mouth daily     • estradiol (ESTRACE) 0.1 mg/g vaginal cream 1 gram vaginally  weekly 42.5 g 3   • fexofenadine (ALLEGRA) 180 MG tablet Take 180 mg by mouth daily     • fluticasone (FLONASE) 50 mcg/act nasal spray   2   • glucose blood test strip TEST BLOOD SUGAR ONCE DIALY. • levothyroxine 75 mcg tablet      • lisinopril-hydrochlorothiazide (PRINZIDE,ZESTORETIC) 10-12.5 MG per tablet      • meloxicam (MOBIC) 7.5 mg tablet Take 7.5 mg by mouth daily     • metFORMIN (FORTAMET) 1000 MG (OSM) 24 hr tablet      • methylPREDNISolone 4 MG tablet therapy pack Take as directed per package instructions.      • mometasone (ELOCON) 0.1 % cream Apply topically daily Apply topically BID x 2 weeks then qhs prn 45 g 2   • neomycin-polymyxin-hydrocortisone (CORTISPORIN) 0.35%-10,000 units/mL-1% otic suspension Administer into ears (Patient not taking: Reported on 10/14/2021)     • Omega-3 Fatty Acids (FISH OIL) 645 MG CAPS Take by mouth     • Probiotic Product (PROBIOTIC DAILY PO) Take by mouth     • semaglutide, 0.25 or 0.5 mg/dose, (Ozempic, 0.25 or 0.5 MG/DOSE,) 2 mg/3 mL injection pen Inject 0.38 mL (0.2533 mg total) under the skin every 7 days for 28 days, THEN 0.75 mL (0.5 mg total) every 7 days. 3 mL 5   • SUMAtriptan (IMITREX) 50 mg tablet Take by mouth     • Turmeric Curcumin 500 MG CAPS Take by mouth     • varenicline (CHANTIX) 0.5 mg tablet Take by mouth every 12 (twelve) hours     • White Petrolatum-Mineral Oil (REFRESH LACRI-LUBE OP) Apply to eye       No current facility-administered medications for this visit. Current Outpatient Medications on File Prior to Visit   Medication Sig   • allopurinol (ZYLOPRIM) 100 mg tablet    • ascorbic acid (VITAMIN C) 500 mg tablet Take 500 mg by mouth daily   • atorvastatin (LIPITOR) 20 mg tablet Take 1 tablet (20 mg total) by mouth daily   • Brimonidine Tartrate 0.025 % SOLN Apply to eye   • cholecalciferol (VITAMIN D3) 1,000 units tablet Take 2,000 Units by mouth daily   • citalopram (CeleXA) 10 mg tablet Take 10 mg by mouth   • clobetasol (TEMOVATE) 0.05 % cream Apply topically 2 (two) times a day for 14 days   • cycloSPORINE (RESTASIS) 0.05 % ophthalmic emulsion 1 drop 2 (two) times a day   • dapagliflozin (Farxiga) 10 MG tablet Take 1 tablet (10 mg total) by mouth daily (Patient not taking: Reported on 7/17/2023)   • dapagliflozin (Farxiga) 5 MG TABS Take 5 mg by mouth daily   • DOCOSAHEXAENOIC ACID-EPA PO Take by mouth   • doxycycline monohydrate (MONODOX) 100 mg capsule Take 100 mg by mouth daily   • estradiol (ESTRACE) 0.1 mg/g vaginal cream 1 gram vaginally  weekly   • fexofenadine (ALLEGRA) 180 MG tablet Take 180 mg by mouth daily   • fluticasone (FLONASE) 50 mcg/act nasal spray    • glucose blood test strip TEST BLOOD SUGAR ONCE DIALY. • levothyroxine 75 mcg tablet    • lisinopril-hydrochlorothiazide (PRINZIDE,ZESTORETIC) 10-12.5 MG per tablet    • meloxicam (MOBIC) 7.5 mg tablet Take 7.5 mg by mouth daily   • metFORMIN (FORTAMET) 1000 MG (OSM) 24 hr tablet    • methylPREDNISolone 4 MG tablet therapy pack Take as directed per package instructions.    • mometasone (ELOCON) 0.1 % cream Apply topically daily Apply topically BID x 2 weeks then qhs prn   • neomycin-polymyxin-hydrocortisone (CORTISPORIN) 0.35%-10,000 units/mL-1% otic suspension Administer into ears (Patient not taking: Reported on 10/14/2021)   • Omega-3 Fatty Acids (FISH OIL) 645 MG CAPS Take by mouth   • Probiotic Product (PROBIOTIC DAILY PO) Take by mouth   • semaglutide, 0.25 or 0.5 mg/dose, (Ozempic, 0.25 or 0.5 MG/DOSE,) 2 mg/3 mL injection pen Inject 0.38 mL (0.2533 mg total) under the skin every 7 days for 28 days, THEN 0.75 mL (0.5 mg total) every 7 days. • SUMAtriptan (IMITREX) 50 mg tablet Take by mouth   • Turmeric Curcumin 500 MG CAPS Take by mouth   • varenicline (CHANTIX) 0.5 mg tablet Take by mouth every 12 (twelve) hours   • White Petrolatum-Mineral Oil (REFRESH LACRI-LUBE OP) Apply to eye     No current facility-administered medications on file prior to visit. She is allergic to gluten meal - food allergy, penicillin g, wheat bran - food allergy, diclofenac, diclofenac sodium, and penicillins. .    Review of Systems   Constitutional: Negative. Gastrointestinal: Negative. Genitourinary: Negative. Objective: There were no vitals taken for this visit. Physical Exam  Vitals reviewed. Cardiovascular:      Rate and Rhythm: Normal rate and regular rhythm. Pulses: Normal pulses. Heart sounds: Normal heart sounds. Pulmonary:      Effort: Pulmonary effort is normal.      Breath sounds: Normal breath sounds. Abdominal:      General: There is no distension. Palpations: Abdomen is soft. There is no mass. Tenderness: There is no abdominal tenderness. There is no guarding or rebound. Hernia: No hernia is present. There is no hernia in the left inguinal area or right inguinal area. Genitourinary:     General: Normal vulva. Labia:         Right: No rash, tenderness or lesion. Left: No rash, tenderness or lesion.        Vagina: Normal.      Cervix: Normal.      Uterus: Normal. Adnexa:         Right: No mass, tenderness or fullness. Left: No mass, tenderness or fullness. Lymphadenopathy:      Lower Body: No right inguinal adenopathy. No left inguinal adenopathy. Neurological:      Mental Status: She is alert.

## 2023-08-18 PROCEDURE — 88305 TISSUE EXAM BY PATHOLOGIST: CPT | Performed by: PATHOLOGY

## 2023-09-05 ENCOUNTER — TELEPHONE (OUTPATIENT)
Dept: ADMINISTRATIVE | Facility: OTHER | Age: 78
End: 2023-09-05

## 2023-09-05 NOTE — TELEPHONE ENCOUNTER
Upon review of the In Basket request we were able to locate, review, and update the patient chart as requested for Medicare AW. Any additional questions or concerns should be emailed to the Practice Liaisons via the appropriate education email address, please do not reply via In Basket.     Thank you  Annie Branch, MA

## 2023-09-05 NOTE — TELEPHONE ENCOUNTER
----- Message from Mercedes Aguiar sent at 9/1/2023  2:41 PM EDT -----  Regarding: AWV  09/01/23 2:41 PM    Hello, our patient Bernice Urbano has had Medicare AWV completed/performed. Please assist in updating the patient chart by pulling the document from the Media Tab. The date of service is 01/16/2023.      Thank you,  Mercedes Aguiar   [address]

## 2023-09-07 ENCOUNTER — TELEPHONE (OUTPATIENT)
Dept: FAMILY MEDICINE CLINIC | Facility: CLINIC | Age: 78
End: 2023-09-07

## 2023-09-07 DIAGNOSIS — B37.31 MONILIAL VAGINITIS: Primary | ICD-10-CM

## 2023-09-07 RX ORDER — CLOTRIMAZOLE AND BETAMETHASONE DIPROPIONATE 10; .64 MG/G; MG/G
CREAM TOPICAL 2 TIMES DAILY
Qty: 30 G | Refills: 1 | Status: SHIPPED | OUTPATIENT
Start: 2023-09-07

## 2023-09-07 RX ORDER — FLUCONAZOLE 150 MG/1
150 TABLET ORAL DAILY
Qty: 10 TABLET | Refills: 0 | Status: SHIPPED | OUTPATIENT
Start: 2023-09-07 | End: 2023-09-17

## 2023-09-07 NOTE — TELEPHONE ENCOUNTER
Phone call from patient. She started last Friday with a yeast infection. Itching and burning. She used Ketoconazole cream and it seemed to be going away on Tuesday, only a small area. Now the redness has travelled from front to back. Is there something that Dr. Jessie Calabrese could give her? Uses [facility] ([facility]). Call her at [phone].

## 2023-09-13 ENCOUNTER — TELEMEDICINE (OUTPATIENT)
Dept: FAMILY MEDICINE CLINIC | Facility: CLINIC | Age: 78
End: 2023-09-13
Payer: MEDICARE

## 2023-09-13 DIAGNOSIS — U07.1 COVID-19: Primary | ICD-10-CM

## 2023-09-13 PROCEDURE — 99441 PR PHYS/QHP TELEPHONE EVALUATION 5-10 MIN: CPT | Performed by: NURSE PRACTITIONER

## 2023-09-13 NOTE — PROGRESS NOTES
COVID-19 Outpatient Progress Note    Assessment/Plan:    Problem List Items Addressed This Visit    None     Disposition:     Discussed symptom directed medication options with patient. I have spent a total time of 10 minutes on the day of the encounter for this patient including discussing diagnostic results, discussing prognosis, risks and benefits of treatment options, instructions for management, patient and family education, impressions and obtaining or reviewing history. Encounter provider: GILBERTO Richards     Provider located at: [address] [facility] [address] [facility]  [phone]     Recent Visits  Date Type Provider Dept   09/07/23 [place]phone [place] [address] [facility]   Showing recent visits within past 7 days and meeting all other requirements  Today's Visits  Date Type Provider Dept   09/13/23 Telemedicine GILBERTO Richards [facility]   Showing today's visits and meeting all other requirements  Future Appointments  No visits were found meeting these conditions. Showing future appointments within next 150 days and meeting all other requirements     This virtual check-in was done via telephone and she agrees to proceed. Patient agrees to participate in a virtual check in via telephone or video visit instead of presenting to the office to address urgent/immediate medical needs. Patient is aware this is a billable service. She acknowledged consent and understanding of privacy and security of the video platform. The patient has agreed to participate and understands they can discontinue the visit at any time. After connecting through Telephone, the patient was identified by name and date of birth. Josué Melvin was informed that this was a telemedicine visit and that the exam was being conducted confidentially over secure lines. My office door was closed. No one else was in the room.  [facility] acknowledged consent and understanding of privacy and security of the telemedicine visit. I informed the patient that I have reviewed her record in Epic and presented the opportunity for her to ask any questions regarding the visit today. The patient agreed to participate. Verification of patient location:  Patient is located in the following state in which I hold an active license: [place]    Subjective:   Rosalio Nichole is a 70 y.o. female who is concerned about COVID-19. Patient's symptoms include fever, nasal congestion and cough. Patient denies chills, fatigue, rhinorrhea, sore throat, anosmia, loss of taste, shortness of breath, chest tightness, nausea, vomiting, diarrhea and headaches. - Date of symptom onset: 9/11/2023      COVID-19 vaccination status: Fully vaccinated with booster    Better today then yesterday. No fever today, fluids    Lab Results   Component Value Date    SARSCOV2 Negative 12/30/2021       Review of Systems   Constitutional: Positive for fever. Negative for chills and fatigue. HENT: Positive for congestion. Negative for rhinorrhea and sore throat. Respiratory: Positive for cough. Negative for chest tightness and shortness of breath. Gastrointestinal: Negative for diarrhea, nausea and vomiting. Neurological: Negative for headaches.      Current Outpatient Medications on File Prior to Visit   Medication Sig   • allopurinol (ZYLOPRIM) 100 mg tablet    • ascorbic acid (VITAMIN C) 500 mg tablet Take 500 mg by mouth daily   • atorvastatin (LIPITOR) 20 mg tablet Take 1 tablet (20 mg total) by mouth daily   • Brimonidine Tartrate 0.025 % SOLN Apply to eye   • cholecalciferol (VITAMIN D3) 1,000 units tablet Take 2,000 Units by mouth daily   • citalopram (CeleXA) 10 mg tablet Take 10 mg by mouth   • clobetasol (TEMOVATE) 0.05 % cream Apply topically 2 (two) times a day for 14 days   • clotrimazole-betamethasone (LOTRISONE) 1-0.05 % cream Apply topically 2 (two) times a day   • cycloSPORINE (RESTASIS) 0.05 % ophthalmic emulsion 1 drop 2 (two) times a day   • dapagliflozin (Farxiga) 10 MG tablet Take 1 tablet (10 mg total) by mouth daily (Patient not taking: Reported on 7/17/2023)   • dapagliflozin (Farxiga) 5 MG TABS Take 5 mg by mouth daily   • DOCOSAHEXAENOIC ACID-EPA PO Take by mouth   • doxycycline monohydrate (MONODOX) 100 mg capsule Take 100 mg by mouth daily   • estradiol (ESTRACE) 0.1 mg/g vaginal cream 1 gram vaginally  weekly   • fexofenadine (ALLEGRA) 180 MG tablet Take 180 mg by mouth daily   • fluconazole (DIFLUCAN) 150 mg tablet Take 1 tablet (150 mg total) by mouth daily for 10 days   • fluticasone (FLONASE) 50 mcg/act nasal spray    • glucose blood test strip TEST BLOOD SUGAR ONCE DIALY. • levothyroxine 75 mcg tablet    • lisinopril-hydrochlorothiazide (PRINZIDE,ZESTORETIC) 10-12.5 MG per tablet    • meloxicam (MOBIC) 7.5 mg tablet Take 7.5 mg by mouth daily   • metFORMIN (FORTAMET) 1000 MG (OSM) 24 hr tablet    • methylPREDNISolone 4 MG tablet therapy pack Take as directed per package instructions. • mometasone (ELOCON) 0.1 % cream Apply topically daily Apply topically BID x 2 weeks then qhs prn   • neomycin-polymyxin-hydrocortisone (CORTISPORIN) 0.35%-10,000 units/mL-1% otic suspension Administer into ears (Patient not taking: Reported on 10/14/2021)   • Omega-3 Fatty Acids (FISH OIL) 645 MG CAPS Take by mouth   • Probiotic Product (PROBIOTIC DAILY PO) Take by mouth   • semaglutide, 0.25 or 0.5 mg/dose, (Ozempic, 0.25 or 0.5 MG/DOSE,) 2 mg/3 mL injection pen Inject 0.38 mL (0.2533 mg total) under the skin every 7 days for 28 days, THEN 0.75 mL (0.5 mg total) every 7 days. • SUMAtriptan (IMITREX) 50 mg tablet Take by mouth   • Turmeric Curcumin 500 MG CAPS Take by mouth   • varenicline (CHANTIX) 0.5 mg tablet Take by mouth every 12 (twelve) hours   • White Petrolatum-Mineral Oil (REFRESH LACRI-LUBE OP) Apply to eye       Objective:     There were no vitals taken for this visit.        Physical Exam  GILBERTO Wright

## 2023-09-19 ENCOUNTER — OFFICE VISIT (OUTPATIENT)
Dept: CARDIOLOGY CLINIC | Facility: HOSPITAL | Age: 78
End: 2023-09-19
Payer: MEDICARE

## 2023-09-19 VITALS
SYSTOLIC BLOOD PRESSURE: 152 MMHG | DIASTOLIC BLOOD PRESSURE: 82 MMHG | HEIGHT: 67 IN | BODY MASS INDEX: 32.77 KG/M2 | WEIGHT: 208.8 LBS | HEART RATE: 72 BPM

## 2023-09-19 DIAGNOSIS — N18.31 STAGE 3A CHRONIC KIDNEY DISEASE (HCC): ICD-10-CM

## 2023-09-19 DIAGNOSIS — I25.10 CORONARY ARTERY CALCIFICATION SEEN ON CT SCAN: Primary | ICD-10-CM

## 2023-09-19 DIAGNOSIS — N18.2 TYPE 2 DIABETES MELLITUS WITH STAGE 2 CHRONIC KIDNEY DISEASE, WITHOUT LONG-TERM CURRENT USE OF INSULIN: ICD-10-CM

## 2023-09-19 DIAGNOSIS — E66.01 MORBID (SEVERE) OBESITY DUE TO EXCESS CALORIES (HCC): ICD-10-CM

## 2023-09-19 DIAGNOSIS — E11.22 TYPE 2 DIABETES MELLITUS WITH STAGE 2 CHRONIC KIDNEY DISEASE, WITHOUT LONG-TERM CURRENT USE OF INSULIN: ICD-10-CM

## 2023-09-19 DIAGNOSIS — E78.2 MIXED HYPERLIPIDEMIA: ICD-10-CM

## 2023-09-19 DIAGNOSIS — G47.33 OSA ON CPAP: ICD-10-CM

## 2023-09-19 DIAGNOSIS — I10 ESSENTIAL HYPERTENSION: ICD-10-CM

## 2023-09-19 PROCEDURE — 99204 OFFICE O/P NEW MOD 45 MIN: CPT | Performed by: INTERNAL MEDICINE

## 2023-09-19 PROCEDURE — 93000 ELECTROCARDIOGRAM COMPLETE: CPT | Performed by: INTERNAL MEDICINE

## 2023-09-19 RX ORDER — ASPIRIN 81 MG/1
81 TABLET, CHEWABLE ORAL DAILY
Qty: 90 TABLET | Refills: 3
Start: 2023-09-19

## 2023-09-19 NOTE — PROGRESS NOTES
Isael Davis  1945  [id number]  [facility] [place] [address] [place] [address]  [phone]    1. Coronary artery calcification seen on CT scan  POCT ECG    aspirin 81 mg chewable tablet    Echo complete w/ contrast if indicated      2. Type 2 diabetes mellitus with stage 2 chronic kidney disease, without long-term current use of insulin ([address])        3. DEEPAK on CPAP  aspirin 81 mg chewable tablet    Echo complete w/ contrast if indicated      4. Essential hypertension  aspirin 81 mg chewable tablet    Echo complete w/ contrast if indicated      5. Stage 3a chronic kidney disease ([address])        6. Morbid (severe) obesity due to excess calories ([address])        7. Mixed hyperlipidemia  aspirin 81 mg chewable tablet    Echo complete w/ contrast if indicated          Discussion/Summary:  #1 mildly elevated coronary artery calcium score  #2 type 2 diabetes mellitus  #3 hypertension  #4 mixed hyperlipidemia  #5 mild bilateral carotid artery stenosis  #6 obstructive sleep apnea on CPAP    Recommendations: Given her above diagnoses I recommend continuing with current statin dosing goal LDL less than 70. I have added aspirin 81 mg to her regimen. Blood pressure came down on manual recheck she tells me her home numbers are very well controlled. I am going to check an echocardiogram to look for left ventricular hypertrophy and evaluate right heart given long-term sleep apnea. She has mild bilateral carotid artery stenosis continue statin and add aspirin 81 follow-up every 2 years with imaging. I will see her back in 12 months    Interval History: 24-year-old female with type 2 diabetes mellitus, hypertension, hyperlipidemia, struct of sleep apnea on CPAP presents for second opinion regarding abnormal coronary calcium score. Overall her functional capacity is good she denies any symptoms.   There is been no chest pain, shortness of breath, palpitations, lightheadedness, dizziness, or syncope. Is been a lower extremity edema, PND, orthopnea. She had a calcium score to try to evaluate her cardiac risk. She was questioning whether she should be on statin therapy. She has a history of sleep apnea uses CPAP. She is a non-smoker no significant alcohol or caffeine intake.     Medical Problems     Problem List     Nephrolithiasis    DEEPAK on CPAP    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    Varicose veins of both lower extremities with complications    Renal cyst    Proteinuria    Prediabetes    Morbid (severe) obesity due to excess calories ([facility])    Celiac disease    Cerebrovascular disease    Diverticulosis of colon    Essential hypertension    Gout    Hiatal hernia    Hypothyroidism    Polyp of colon    Type 2 diabetes mellitus ([address])      Lab Results   Component Value Date    HGBA1C 6.0 (H) 07/13/2023         Vitamin D deficiency    Type 2 diabetes mellitus with stage 2 chronic kidney disease, without long-term current use of insulin ([facility])      Lab Results   Component Value Date    HGBA1C 6.0 (H) 07/13/2023         Stage 3a chronic kidney disease ([address])    Lab Results   Component Value Date    EGFR 58 08/04/2023    EGFR 56 07/13/2023    EGFR 56 05/01/2023    CREATININE 0.95 08/04/2023    CREATININE 0.98 07/13/2023    CREATININE 0.97 05/01/2023         Coronary artery calcification seen on CT scan    Mixed hyperlipidemia        Past Medical History:   Diagnosis Date   • Allergic rhinitis    • Anxiety    • Celiac disease    • Cerebrovascular disease    • Colon polyp    • Diabetes mellitus ([address])    • Disease of thyroid gland    • Diverticulosis of colon    • Dry eye    • Gout    • Hiatal hernia    • Hypertension    • Hypothyroidism    • Nephrolithiasis    • Perennial allergic rhinitis    • Peripheral venous insufficiency    • Rosacea conjunctivitis, bilateral    • Sleep apnea    • Vitamin D deficiency      Social History     Socioeconomic History • Marital status: /Civil Union     Spouse name: Not on file   • Number of children: Not on file   • Years of education: Not on file   • Highest education level: Not on file   Occupational History   • Not on file   Tobacco Use   • Smoking status: Never   • Smokeless tobacco: Never   Vaping Use   • Vaping Use: Never used   Substance and Sexual Activity   • Alcohol use: No     Comment: rarely   • Drug use: No   • Sexual activity: Yes     Partners: Male     Birth control/protection: Post-menopausal   Other Topics Concern   • Not on file   Social History Narrative   • Not on file     Social Determinants of Health     Financial Resource Strain: Not on file   Food Insecurity: Not on file   Transportation Needs: Not on file   Physical Activity: Not on file   Stress: Not on file   Social Connections: Not on file   Intimate Partner Violence: Not on file   Housing Stability: Not on file      Family History   Problem Relation Age of Onset   • Heart disease Mother    • Heart disease Father    • Diabetes Father    • Heart disease Sister    • Diabetes Sister    • No Known Problems Daughter    • No Known Problems Maternal Grandmother    • No Known Problems Maternal Grandfather    • No Known Problems Paternal Grandmother    • No Known Problems Paternal Grandfather    • No Known Problems Daughter    • No Known Problems Maternal Aunt    • No Known Problems Paternal Aunt    • No Known Problems Paternal Aunt    • No Known Problems Paternal Aunt    • No Known Problems Paternal Aunt    • Heart disease Brother      Past Surgical History:   Procedure Laterality Date   • CATARACT EXTRACTION, BILATERAL  01/2020   • CHOLECYSTECTOMY     • TONSILLECTOMY     • VARICOSE VEIN SURGERY         Current Outpatient Medications:   •  allopurinol (ZYLOPRIM) 100 mg tablet, , Disp: , Rfl:   •  ascorbic acid (VITAMIN C) 500 mg tablet, Take 500 mg by mouth daily, Disp: , Rfl:   •  aspirin 81 mg chewable tablet, Chew 1 tablet (81 mg total) daily, Disp: 90 tablet, Rfl: 3  •  atorvastatin (LIPITOR) 20 mg tablet, Take 1 tablet (20 mg total) by mouth daily, Disp: 30 tablet, Rfl: 5  •  cholecalciferol (VITAMIN D3) 1,000 units tablet, Take 2,000 Units by mouth daily, Disp: , Rfl:   •  citalopram (CeleXA) 10 mg tablet, Take 10 mg by mouth, Disp: , Rfl: 3  •  clobetasol (TEMOVATE) 0.05 % cream, Apply topically 2 (two) times a day for 14 days, Disp: 30 g, Rfl: 3  •  clotrimazole-betamethasone (LOTRISONE) 1-0.05 % cream, Apply topically 2 (two) times a day, Disp: 30 g, Rfl: 1  •  cycloSPORINE (RESTASIS) 0.05 % ophthalmic emulsion, 1 drop 2 (two) times a day, Disp: , Rfl:   •  dapagliflozin (Farxiga) 5 MG TABS, Take 5 mg by mouth daily, Disp: , Rfl:   •  doxycycline monohydrate (MONODOX) 100 mg capsule, Take 100 mg by mouth daily, Disp: , Rfl:   •  estradiol (ESTRACE) 0.1 mg/g vaginal cream, 1 gram vaginally  weekly, Disp: 42.5 g, Rfl: 3  •  fluticasone (FLONASE) 50 mcg/act nasal spray, , Disp: , Rfl: 2  •  glucose blood test strip, TEST BLOOD SUGAR ONCE DIALY. , Disp: , Rfl:   •  levothyroxine 75 mcg tablet, , Disp: , Rfl:   •  lisinopril-hydrochlorothiazide (PRINZIDE,ZESTORETIC) 10-12.5 MG per tablet, , Disp: , Rfl:   •  meloxicam (MOBIC) 7.5 mg tablet, Take 7.5 mg by mouth daily, Disp: , Rfl:   •  methylPREDNISolone 4 MG tablet therapy pack, Take as directed per package instructions. , Disp: , Rfl:   •  Omega-3 Fatty Acids (FISH OIL) 645 MG CAPS, Take by mouth, Disp: , Rfl:   •  Probiotic Product (PROBIOTIC DAILY PO), Take by mouth, Disp: , Rfl:   •  semaglutide, 0.25 or 0.5 mg/dose, (Ozempic, 0.25 or 0.5 MG/DOSE,) 2 mg/3 mL injection pen, Inject 0.38 mL (0.2533 mg total) under the skin every 7 days for 28 days, THEN 0.75 mL (0.5 mg total) every 7 days. , Disp: 3 mL, Rfl: 5  •  SUMAtriptan (IMITREX) 50 mg tablet, Take by mouth, Disp: , Rfl:   •  Turmeric Curcumin 500 MG CAPS, Take by mouth, Disp: , Rfl:   •  White Petrolatum-Mineral Oil (REFRESH LACRI-LUBE OP), Apply to eye, Disp: , Rfl:   •  Brimonidine Tartrate 0.025 % SOLN, Apply to eye (Patient not taking: Reported on 9/19/2023), Disp: , Rfl:   •  dapagliflozin (Farxiga) 10 MG tablet, Take 1 tablet (10 mg total) by mouth daily (Patient not taking: Reported on 7/17/2023), Disp: 90 tablet, Rfl: 3  •  DOCOSAHEXAENOIC ACID-EPA PO, Take by mouth (Patient not taking: Reported on 9/19/2023), Disp: , Rfl:   •  fexofenadine (ALLEGRA) 180 MG tablet, Take 180 mg by mouth daily (Patient not taking: Reported on 9/19/2023), Disp: , Rfl:   •  metFORMIN (FORTAMET) 1000 MG (OSM) 24 hr tablet, , Disp: , Rfl:   •  mometasone (ELOCON) 0.1 % cream, Apply topically daily Apply topically BID x 2 weeks then qhs prn (Patient not taking: Reported on 9/19/2023), Disp: 45 g, Rfl: 2  •  neomycin-polymyxin-hydrocortisone (CORTISPORIN) 0.35%-10,000 units/mL-1% otic suspension, Administer into ears (Patient not taking: Reported on 10/14/2021), Disp: , Rfl:   •  varenicline (CHANTIX) 0.5 mg tablet, Take by mouth every 12 (twelve) hours (Patient not taking: Reported on 9/19/2023), Disp: , Rfl:   Allergies   Allergen Reactions   • Gluten Meal - Food Allergy Other (See Comments)   • Penicillin G Hives   • Wheat Bran - Food Allergy Hives   • Diclofenac Rash   • Diclofenac Sodium Rash   • Penicillins Rash and Hives       Labs:     Chemistry        Component Value Date/Time    K 3.9 08/04/2023 0741     08/04/2023 0741    CO2 28 08/04/2023 0741    BUN 19 08/04/2023 0741    CREATININE 0.95 08/04/2023 0741        Component Value Date/Time    CALCIUM 9.5 08/04/2023 0741    ALKPHOS 54 08/04/2023 0741    AST 25 08/04/2023 0741    ALT 35 08/04/2023 0741            No results found for: "CHOL"  Lab Results   Component Value Date    HDL 37 (L) 07/13/2023    HDL 41 (L) 01/09/2023    HDL 39 (L) 07/07/2022     Lab Results   Component Value Date    LDLCALC 100 07/13/2023    LDLCALC 106 (H) 01/09/2023    LDLCALC 106 (H) 07/07/2022     Lab Results   Component Value Date TRIG 173 (H) 07/13/2023    TRIG 109 01/09/2023    TRIG 137 07/07/2022     No results found for: "CHOLHDL"    Imaging: No results found. ECG:        Review of Systems   Constitutional: Negative. HENT: Negative. Eyes: Negative. Cardiovascular: Negative. Respiratory: Negative. Endocrine: Negative. Hematologic/Lymphatic: Negative. Skin: Negative. Musculoskeletal: Negative. Gastrointestinal: Negative. Genitourinary: Negative. Neurological: Negative. Psychiatric/Behavioral: Negative. All other systems reviewed and are negative. Vitals:    09/19/23 1228   BP: 152/82   Pulse: 72     Vitals:    09/19/23 1228   Weight: 94.7 kg (208 lb 12.8 oz)     Height: 5' 7" (170.2 cm)   Body mass index is 32.7 kg/m². Physical Exam:  Vital signs reviewed. General appearance:  Appears stated age, alert, well appearing and in no distress  HEENT:  PERRLA, EOMI, no scleral icterus, no conjunctival pallor  NECK:  Supple, No elevated JVP, no thyromegaly, no carotid bruits, no JVD  HEART:  Regular rate and rhythm, normal S1/S2, no S3/S4, no murmur or rub, PMI nondisplaced  LUNGS:  Clear to auscultation bilaterally, no wheezes rales or rhonchi  ABDOMEN:  Soft, non-tender, positive bowel sounds, no rebound or guarding, no organomegaly   EXTREMITIES:  Normal range of motion. No clubbing or cyanosis.  No edema  VASCULAR:  Normal pedal pulses   SKIN: No lesions or rashes on exposed skin  NEURO:  CN II-XII intact, no focal deficits

## 2023-09-26 ENCOUNTER — HOSPITAL ENCOUNTER (OUTPATIENT)
Dept: NON INVASIVE DIAGNOSTICS | Facility: HOSPITAL | Age: 78
Discharge: HOME | End: 2023-09-26
Attending: INTERNAL MEDICINE
Payer: MEDICARE

## 2023-09-26 VITALS
SYSTOLIC BLOOD PRESSURE: 142 MMHG | HEIGHT: 67 IN | BODY MASS INDEX: 32.77 KG/M2 | WEIGHT: 208.78 LBS | DIASTOLIC BLOOD PRESSURE: 58 MMHG | HEART RATE: 76 BPM

## 2023-09-26 DIAGNOSIS — I10 ESSENTIAL HYPERTENSION: ICD-10-CM

## 2023-09-26 DIAGNOSIS — G47.33 OSA ON CPAP: ICD-10-CM

## 2023-09-26 DIAGNOSIS — E78.2 MIXED HYPERLIPIDEMIA: ICD-10-CM

## 2023-09-26 DIAGNOSIS — I25.10 CORONARY ARTERY CALCIFICATION SEEN ON CT SCAN: ICD-10-CM

## 2023-09-26 LAB
AORTIC ROOT: 2.9 CM
ASCENDING AORTA: 3.8 CM
E WAVE DECELERATION TIME: 215 MS
FRACTIONAL SHORTENING: 38 % (ref 28–44)
INTERVENTRICULAR SEPTUM IN DIASTOLE (PARASTERNAL SHORT AXIS VIEW): 0.9 CM
INTERVENTRICULAR SEPTUM: 0.9 CM (ref 0.6–1.1)
LAAS-AP2: 21.7 CM2
LAAS-AP4: 23.3 CM2
LEFT ATRIUM SIZE: 3.9 CM
LEFT ATRIUM VOLUME (MOD BIPLANE): 80 ML
LEFT INTERNAL DIMENSION IN SYSTOLE: 3.1 CM (ref 2.1–4)
LEFT VENTRICULAR INTERNAL DIMENSION IN DIASTOLE: 5 CM (ref 3.5–6)
LEFT VENTRICULAR POSTERIOR WALL IN END DIASTOLE: 1 CM
LEFT VENTRICULAR STROKE VOLUME: 80 ML
LVSV (TEICH): 80 ML
MV E'TISSUE VEL-LAT: 9 CM/S
MV E'TISSUE VEL-SEP: 8 CM/S
MV PEAK A VEL: 0.83 M/S
MV PEAK E VEL: 81 CM/S
MV STENOSIS PRESSURE HALF TIME: 62 MS
MV VALVE AREA P 1/2 METHOD: 3.55 CM2
PULM VEIN S/D RATIO: 0.82
PV PEAK D VEL: 0.17 M/S
PV PEAK S VEL: 0.14 M/S
RIGHT ATRIUM AREA SYSTOLE A4C: 16.4 CM2
RIGHT VENTRICLE ID DIMENSION: 2.8 CM
SL CV LEFT ATRIUM LENGTH A2C: 5.2 CM
SL CV LV EF: 65
SL CV PED ECHO LEFT VENTRICLE DIASTOLIC VOLUME (MOD BIPLANE) 2D: 117 ML
SL CV PED ECHO LEFT VENTRICLE SYSTOLIC VOLUME (MOD BIPLANE) 2D: 37 ML
TRICUSPID ANNULAR PLANE SYSTOLIC EXCURSION: 2.3 CM
TRICUSPID VALVE PEAK E WAVE VELOCITY: 0.15 M/S

## 2023-09-26 PROCEDURE — 93306 TTE W/DOPPLER COMPLETE: CPT

## 2023-09-26 PROCEDURE — 93306 TTE W/DOPPLER COMPLETE: CPT | Performed by: INTERNAL MEDICINE

## 2023-10-04 ENCOUNTER — TELEPHONE (OUTPATIENT)
Dept: FAMILY MEDICINE CLINIC | Facility: CLINIC | Age: 78
End: 2023-10-04

## 2023-10-04 ENCOUNTER — IMMUNIZATIONS (OUTPATIENT)
Dept: FAMILY MEDICINE CLINIC | Facility: CLINIC | Age: 78
End: 2023-10-04
Payer: MEDICARE

## 2023-10-04 DIAGNOSIS — Z23 ENCOUNTER FOR IMMUNIZATION: Primary | ICD-10-CM

## 2023-10-04 PROCEDURE — G0008 ADMIN INFLUENZA VIRUS VAC: HCPCS

## 2023-10-04 PROCEDURE — 90662 IIV NO PRSV INCREASED AG IM: CPT

## 2023-10-04 NOTE — TELEPHONE ENCOUNTER
Shala Bhagat called to say that she is taking ozempic and she will be flying. She said that she needs a note stating it is ok for her to fly with it.  She is coming for a flu shot this afternoon but I did advise her that it may not be done until the end of the day

## 2023-10-24 ENCOUNTER — APPOINTMENT (OUTPATIENT)
Dept: LAB | Facility: MEDICAL CENTER | Age: 78
End: 2023-10-24
Payer: MEDICARE

## 2023-10-24 ENCOUNTER — TELEPHONE (OUTPATIENT)
Dept: FAMILY MEDICINE CLINIC | Facility: CLINIC | Age: 78
End: 2023-10-24

## 2023-10-24 DIAGNOSIS — N20.0 NEPHROLITHIASIS: ICD-10-CM

## 2023-10-24 DIAGNOSIS — R80.9 PROTEINURIA, UNSPECIFIED TYPE: ICD-10-CM

## 2023-10-24 DIAGNOSIS — Z01.818 PRE-OP TESTING: ICD-10-CM

## 2023-10-24 DIAGNOSIS — E83.52 HYPERCALCEMIA: Primary | ICD-10-CM

## 2023-10-24 LAB
ALBUMIN SERPL BCP-MCNC: 4.2 G/DL (ref 3.5–5)
ALP SERPL-CCNC: 57 U/L (ref 34–104)
ALT SERPL W P-5'-P-CCNC: 19 U/L (ref 7–52)
ANION GAP SERPL CALCULATED.3IONS-SCNC: 7 MMOL/L
AST SERPL W P-5'-P-CCNC: 20 U/L (ref 13–39)
BILIRUB SERPL-MCNC: 0.48 MG/DL (ref 0.2–1)
BUN SERPL-MCNC: 13 MG/DL (ref 5–25)
CALCIUM PRE 500 MG CA PO UR-SCNC: 0.9 MG/DL
CALCIUM SERPL-MCNC: 9.4 MG/DL (ref 8.4–10.2)
CHLORIDE SERPL-SCNC: 101 MMOL/L (ref 96–108)
CO2 SERPL-SCNC: 31 MMOL/L (ref 21–32)
CREAT SERPL-MCNC: 0.79 MG/DL (ref 0.6–1.3)
CREAT UR-MCNC: 13.9 MG/DL
GFR SERPL CREATININE-BSD FRML MDRD: 72 ML/MIN/1.73SQ M
GLUCOSE P FAST SERPL-MCNC: 133 MG/DL (ref 65–99)
POTASSIUM SERPL-SCNC: 4.1 MMOL/L (ref 3.5–5.3)
PROT SERPL-MCNC: 6.9 G/DL (ref 6.4–8.4)
SODIUM SERPL-SCNC: 139 MMOL/L (ref 135–147)

## 2023-10-24 PROCEDURE — 80053 COMPREHEN METABOLIC PANEL: CPT

## 2023-10-24 PROCEDURE — 82570 ASSAY OF URINE CREATININE: CPT

## 2023-10-24 PROCEDURE — 82340 ASSAY OF CALCIUM IN URINE: CPT

## 2023-10-24 NOTE — TELEPHONE ENCOUNTER
Phone call from Isak arevalo. She is there for blood work. She said that she called our office about 2 weeks ago and there were supposed to be orders put in for her from Dr. Fani Tmaayo. There is an order from Dr. Stephany Yoon for a CMP. She will do that today.

## 2023-10-25 DIAGNOSIS — E03.9 ACQUIRED HYPOTHYROIDISM: ICD-10-CM

## 2023-10-25 DIAGNOSIS — E11.9 TYPE 2 DIABETES MELLITUS WITHOUT COMPLICATION, WITHOUT LONG-TERM CURRENT USE OF INSULIN (HCC): Primary | ICD-10-CM

## 2023-10-25 DIAGNOSIS — E78.2 MIXED HYPERLIPIDEMIA: ICD-10-CM

## 2023-10-25 LAB
ALBUMIN SERPL ELPH-MCNC: 3.94 G/DL (ref 3.2–5.1)
ALBUMIN SERPL ELPH-MCNC: 58.8 % (ref 48–70)
ALPHA1 GLOB SERPL ELPH-MCNC: 0.29 G/DL (ref 0.15–0.47)
ALPHA1 GLOB SERPL ELPH-MCNC: 4.4 % (ref 1.8–7)
ALPHA2 GLOB SERPL ELPH-MCNC: 0.72 G/DL (ref 0.42–1.04)
ALPHA2 GLOB SERPL ELPH-MCNC: 10.8 % (ref 5.9–14.9)
BETA GLOB ABNORMAL SERPL ELPH-MCNC: 0.37 G/DL (ref 0.31–0.57)
BETA1 GLOB SERPL ELPH-MCNC: 5.5 % (ref 4.7–7.7)
BETA2 GLOB SERPL ELPH-MCNC: 5.1 % (ref 3.1–7.9)
BETA2+GAMMA GLOB SERPL ELPH-MCNC: 0.34 G/DL (ref 0.2–0.58)
GAMMA GLOB ABNORMAL SERPL ELPH-MCNC: 1.03 G/DL (ref 0.4–1.66)
GAMMA GLOB SERPL ELPH-MCNC: 15.4 % (ref 6.9–22.3)
IGG/ALB SER: 1.43 {RATIO} (ref 1.1–1.8)
PROT PATTERN SERPL ELPH-IMP: NORMAL
PROT SERPL-MCNC: 6.7 G/DL (ref 6.4–8.2)

## 2023-10-25 PROCEDURE — 84165 PROTEIN E-PHORESIS SERUM: CPT | Performed by: PATHOLOGY

## 2023-10-26 ENCOUNTER — APPOINTMENT (OUTPATIENT)
Dept: LAB | Facility: MEDICAL CENTER | Age: 78
End: 2023-10-26
Payer: MEDICARE

## 2023-10-26 DIAGNOSIS — E78.2 MIXED HYPERLIPIDEMIA: ICD-10-CM

## 2023-10-26 DIAGNOSIS — E03.9 ACQUIRED HYPOTHYROIDISM: ICD-10-CM

## 2023-10-26 DIAGNOSIS — E11.9 TYPE 2 DIABETES MELLITUS WITHOUT COMPLICATION, WITHOUT LONG-TERM CURRENT USE OF INSULIN (HCC): ICD-10-CM

## 2023-10-26 LAB
CHOLEST SERPL-MCNC: 94 MG/DL
EST. AVERAGE GLUCOSE BLD GHB EST-MCNC: 128 MG/DL
HBA1C MFR BLD: 6.1 %
HDLC SERPL-MCNC: 36 MG/DL
LDLC SERPL CALC-MCNC: 41 MG/DL (ref 0–100)
NONHDLC SERPL-MCNC: 58 MG/DL
TRIGL SERPL-MCNC: 84 MG/DL
TSH SERPL DL<=0.05 MIU/L-ACNC: 1.46 UIU/ML (ref 0.45–4.5)

## 2023-10-26 PROCEDURE — 36415 COLL VENOUS BLD VENIPUNCTURE: CPT

## 2023-10-26 PROCEDURE — 83036 HEMOGLOBIN GLYCOSYLATED A1C: CPT

## 2023-10-26 PROCEDURE — 80061 LIPID PANEL: CPT

## 2023-10-26 PROCEDURE — 84443 ASSAY THYROID STIM HORMONE: CPT

## 2023-10-27 PROBLEM — J30.89 PERENNIAL ALLERGIC RHINITIS: Status: ACTIVE | Noted: 2018-05-04

## 2023-10-27 PROBLEM — M22.40 CHONDROMALACIA PATELLAE: Status: ACTIVE | Noted: 2023-10-27

## 2023-10-30 ENCOUNTER — OFFICE VISIT (OUTPATIENT)
Dept: FAMILY MEDICINE CLINIC | Facility: CLINIC | Age: 78
End: 2023-10-30
Payer: MEDICARE

## 2023-10-30 ENCOUNTER — TELEPHONE (OUTPATIENT)
Dept: ADMINISTRATIVE | Facility: OTHER | Age: 78
End: 2023-10-30

## 2023-10-30 ENCOUNTER — OFFICE VISIT (OUTPATIENT)
Dept: NEPHROLOGY | Facility: CLINIC | Age: 78
End: 2023-10-30
Payer: MEDICARE

## 2023-10-30 VITALS
OXYGEN SATURATION: 98 % | DIASTOLIC BLOOD PRESSURE: 68 MMHG | SYSTOLIC BLOOD PRESSURE: 132 MMHG | HEART RATE: 58 BPM | HEIGHT: 67 IN | WEIGHT: 210 LBS | BODY MASS INDEX: 32.96 KG/M2

## 2023-10-30 VITALS
BODY MASS INDEX: 33.12 KG/M2 | SYSTOLIC BLOOD PRESSURE: 132 MMHG | DIASTOLIC BLOOD PRESSURE: 62 MMHG | TEMPERATURE: 97 F | WEIGHT: 211 LBS | HEIGHT: 67 IN

## 2023-10-30 DIAGNOSIS — N18.2 TYPE 2 DIABETES MELLITUS WITH STAGE 2 CHRONIC KIDNEY DISEASE, WITHOUT LONG-TERM CURRENT USE OF INSULIN: Primary | ICD-10-CM

## 2023-10-30 DIAGNOSIS — E11.22 TYPE 2 DIABETES MELLITUS WITH STAGE 2 CHRONIC KIDNEY DISEASE, WITHOUT LONG-TERM CURRENT USE OF INSULIN: Primary | ICD-10-CM

## 2023-10-30 DIAGNOSIS — G47.33 OSA ON CPAP: ICD-10-CM

## 2023-10-30 DIAGNOSIS — I10 ESSENTIAL HYPERTENSION: ICD-10-CM

## 2023-10-30 DIAGNOSIS — I25.10 CORONARY ARTERY CALCIFICATION SEEN ON CT SCAN: ICD-10-CM

## 2023-10-30 DIAGNOSIS — N18.31 STAGE 3A CHRONIC KIDNEY DISEASE (HCC): ICD-10-CM

## 2023-10-30 DIAGNOSIS — E78.2 MIXED HYPERLIPIDEMIA: ICD-10-CM

## 2023-10-30 PROBLEM — R73.03 PREDIABETES: Status: RESOLVED | Noted: 2023-04-13 | Resolved: 2023-10-30

## 2023-10-30 PROCEDURE — 99214 OFFICE O/P EST MOD 30 MIN: CPT | Performed by: FAMILY MEDICINE

## 2023-10-30 PROCEDURE — 99213 OFFICE O/P EST LOW 20 MIN: CPT | Performed by: INTERNAL MEDICINE

## 2023-10-30 RX ORDER — ATORVASTATIN CALCIUM 20 MG/1
20 TABLET, FILM COATED ORAL DAILY
Qty: 90 TABLET | Refills: 5 | Status: SHIPPED | OUTPATIENT
Start: 2023-10-30

## 2023-10-30 NOTE — ASSESSMENT & PLAN NOTE
As per cardiology. Now on baby aspirin. Discussed must continue to control blood pressure as well as cholesterol and sugar. She has follow-up with cardiology in 6 months.

## 2023-10-30 NOTE — PROGRESS NOTES
[facility] [place], [place]    Name: Estrellita [facility]  YOB: 1945      Assessment/Plan:         Problem List Items Addressed This Visit          Endocrine    Type 2 diabetes mellitus with stage 2 chronic kidney disease, without long-term current use of insulin  - Primary       Lab Results   Component Value Date    HGBA1C 6.1 (H) 10/26/2023   Followed by PCP   She has proteinuria which is controlled by Jennifer Hacking 5mg and lisinopril daily  NO side effects with either medication            Respiratory    DEEPAK on CPAP     Using CPAP            Cardiovascular and Mediastinum    Essential hypertension     Blood pressure is well controlled         Coronary artery calcification seen on CT scan     Aim for a low LDL to prevent progression  Suggested plant based dinners 2-3 times a week              Subjective:      Patient ID: Celina Feliz is a 70 y.o. female. referred by Dr Jose A White    HPI history of proteinuria, nephrolithiasis and hypertension. She has dry eyes and prediabetes  She has been feeling well without any difficulty  Had Covid in September without sequae    The following portions of the patient's history were reviewed and updated as appropriate: allergies, current medications, past family history, past medical history, past social history, past surgical history and problem list.    Review of Systems   Constitutional:  Negative for fatigue. HENT:  Negative for hearing loss. Eyes:  Negative for visual disturbance. Respiratory:  Negative for shortness of breath. Cardiovascular:  Negative for chest pain, palpitations and leg swelling. Gastrointestinal:  Negative for abdominal pain, blood in stool, constipation and diarrhea. Genitourinary:  Negative for difficulty urinating, dysuria, hematuria and urgency. Musculoskeletal:  Negative for arthralgias and back pain. Neurological:  Negative for dizziness and weakness.    Psychiatric/Behavioral:  Negative for dysphoric mood. Social History     Socioeconomic History    Marital status: /Civil Union     Spouse name: None    Number of children: None    Years of education: None    Highest education level: None   Occupational History    None   Tobacco Use    Smoking status: Never    Smokeless tobacco: Never   Vaping Use    Vaping Use: Never used   Substance and Sexual Activity    Alcohol use: No     Comment: rarely    Drug use: No    Sexual activity: Yes     Partners: Male     Birth control/protection: Post-menopausal   Other Topics Concern    None   Social History Narrative    None     Social Determinants of Health     Financial Resource Strain: Not on file   Food Insecurity: Not on file   Transportation Needs: Not on file   Physical Activity: Not on file   Stress: Not on file   Social Connections: Not on file   Intimate Partner Violence: Not on file   Housing Stability: Not on file     Past Medical History:   Diagnosis Date    Allergic rhinitis     Anxiety     Celiac disease     Cerebrovascular disease     Colon polyp     Diabetes mellitus ([address])     Disease of thyroid gland     Diverticulosis of colon     Dry eye     Gout     Hiatal hernia     Hypertension     Hypothyroidism     Kidney stone     Nephrolithiasis     Perennial allergic rhinitis     Peripheral venous insufficiency     Rosacea conjunctivitis, bilateral     Sleep apnea     Vitamin D deficiency      Past Surgical History:   Procedure Laterality Date    APPENDECTOMY  ?     CATARACT EXTRACTION, BILATERAL  01/2020    CHOLECYSTECTOMY      TONSILLECTOMY      TUBAL LIGATION      VARICOSE VEIN SURGERY         Current Outpatient Medications:     allopurinol (ZYLOPRIM) 100 mg tablet, , Disp: , Rfl:     aspirin 81 mg chewable tablet, Chew 1 tablet (81 mg total) daily, Disp: 90 tablet, Rfl: 3    atorvastatin (LIPITOR) 20 mg tablet, Take 1 tablet (20 mg total) by mouth daily, Disp: 90 tablet, Rfl: 5    cholecalciferol (VITAMIN D3) 1,000 units tablet, Take 2,000 Units by mouth daily, Disp: , Rfl:     citalopram (CeleXA) 10 mg tablet, Take 10 mg by mouth, Disp: , Rfl: 3    clotrimazole-betamethasone (LOTRISONE) 1-0.05 % cream, Apply topically 2 (two) times a day, Disp: 30 g, Rfl: 1    cycloSPORINE (RESTASIS) 0.05 % ophthalmic emulsion, 1 drop 2 (two) times a day, Disp: , Rfl:     dapagliflozin (Farxiga) 10 MG tablet, Take 1 tablet (10 mg total) by mouth daily (Patient taking differently: Take 5 mg by mouth daily), Disp: 90 tablet, Rfl: 3    estradiol (ESTRACE) 0.1 mg/g vaginal cream, 1 gram vaginally  weekly, Disp: 42.5 g, Rfl: 3    fluticasone (FLONASE) 50 mcg/act nasal spray, , Disp: , Rfl: 2    glucose blood test strip, TEST BLOOD SUGAR ONCE DIALY. , Disp: , Rfl:     levothyroxine 75 mcg tablet, , Disp: , Rfl:     lisinopril-hydrochlorothiazide (PRINZIDE,ZESTORETIC) 10-12.5 MG per tablet, , Disp: , Rfl:     Omega-3 Fatty Acids (FISH OIL) 645 MG CAPS, Take by mouth, Disp: , Rfl:     Probiotic Product (PROBIOTIC DAILY PO), Take by mouth, Disp: , Rfl:     SUMAtriptan (IMITREX) 50 mg tablet, Take by mouth, Disp: , Rfl:     Turmeric Curcumin 500 MG CAPS, Take by mouth, Disp: , Rfl:     White Petrolatum-Mineral Oil (REFRESH LACRI-LUBE OP), Apply to eye, Disp: , Rfl:     Brimonidine Tartrate 0.025 % SOLN, Apply to eye (Patient not taking: Reported on 9/19/2023), Disp: , Rfl:     clobetasol (TEMOVATE) 0.05 % cream, Apply topically 2 (two) times a day for 14 days, Disp: 30 g, Rfl: 3    DOCOSAHEXAENOIC ACID-EPA PO, Take by mouth, Disp: , Rfl:     doxycycline monohydrate (MONODOX) 100 mg capsule, Take 100 mg by mouth daily (Patient not taking: Reported on 10/30/2023), Disp: , Rfl:     fexofenadine (ALLEGRA) 180 MG tablet, Take 180 mg by mouth daily (Patient not taking: Reported on 9/19/2023), Disp: , Rfl:     meloxicam (MOBIC) 7.5 mg tablet, Take 7.5 mg by mouth daily, Disp: , Rfl:     methylPREDNISolone 4 MG tablet therapy pack, Take as directed per package instructions.  (Patient not taking: Reported on 10/30/2023), Disp: , Rfl:     mometasone (ELOCON) 0.1 % cream, Apply topically daily Apply topically BID x 2 weeks then qhs prn (Patient not taking: Reported on 9/19/2023), Disp: 45 g, Rfl: 2    neomycin-polymyxin-hydrocortisone (CORTISPORIN) 0.35%-10,000 units/mL-1% otic suspension, Administer into ears (Patient not taking: Reported on 10/14/2021), Disp: , Rfl:     Lab Results   Component Value Date    SODIUM 139 10/24/2023    K 4.1 10/24/2023     10/24/2023    CO2 31 10/24/2023    AGAP 7 10/24/2023    BUN 13 10/24/2023    CREATININE 0.79 10/24/2023    GLUF 133 (H) 10/24/2023    CALCIUM 9.4 10/24/2023    AST 20 10/24/2023    ALT 19 10/24/2023    ALKPHOS 57 10/24/2023    TP 6.7 10/24/2023    TP 6.9 10/24/2023    TBILI 0.48 10/24/2023    EGFR 72 10/24/2023     Lab Results   Component Value Date    WBC 5.50 08/04/2023    HGB 14.4 08/04/2023    HCT 44.7 08/04/2023    MCV 99 (H) 08/04/2023     08/04/2023     Lab Results   Component Value Date    CHOLESTEROL 94 10/26/2023    CHOLESTEROL 172 07/13/2023    CHOLESTEROL 169 01/09/2023     Lab Results   Component Value Date    HDL 36 (L) 10/26/2023    HDL 37 (L) 07/13/2023    HDL 41 (L) 01/09/2023     Lab Results   Component Value Date    LDLCALC 41 10/26/2023    LDLCALC 100 07/13/2023    LDLCALC 106 (H) 01/09/2023     Lab Results   Component Value Date    TRIG 84 10/26/2023    TRIG 173 (H) 07/13/2023    TRIG 109 01/09/2023     No results found for: "CHOLHDL"  Lab Results   Component Value Date    [id number] 1.461 10/26/2023     Lab Results   Component Value Date    CALCIUM 9.4 10/24/2023     Lab Results   Component Value Date    SPEP See Comment 10/24/2023     No results found for: "Amelia Clemons", "RANI"        Objective:      /68 (BP Location: Left arm, Patient Position: Sitting, Cuff Size: Large)   Pulse 58   Ht 5' 7" (1.702 m)   Wt 95.3 kg (210 lb)   SpO2 98%   BMI 32.89 kg/m²          Physical Exam  Vitals reviewed. Constitutional:       General: She is not in acute distress. Appearance: Normal appearance. She is not toxic-appearing. HENT:      Head: Normocephalic and atraumatic. Right Ear: External ear normal.      Left Ear: External ear normal.   Eyes:      Extraocular Movements: Extraocular movements intact. Conjunctiva/sclera: Conjunctivae normal.   Cardiovascular:      Rate and Rhythm: Normal rate and regular rhythm. Pulses: Normal pulses. Pulmonary:      Effort: Pulmonary effort is normal. No respiratory distress. Breath sounds: Normal breath sounds. No wheezing or rales. Abdominal:      General: Bowel sounds are normal. There is no distension. Palpations: Abdomen is soft. Tenderness: There is no abdominal tenderness. Musculoskeletal:      Right lower leg: No edema. Left lower leg: No edema. Neurological:      General: No focal deficit present. Mental Status: She is alert and oriented to person, place, and time. Mental status is at baseline. Psychiatric:         Mood and Affect: Mood normal.         Behavior: Behavior normal.         Thought Content:  Thought content normal.         Judgment: Judgment normal.

## 2023-10-30 NOTE — LETTER
Diabetic Eye Exam Form    Date Requested: 10/30/23  Patient: Lis Mata  Patient : 1945   Referring Provider: Justina Cabrera MD      DIABETIC Eye Exam Date _______________________________      Type of Exam MUST be documented for Diabetic Eye Exams. Please CHECK ONE. Retinal Exam       Dilated Retinal Exam       OCT       Optomap-Iris Exam      Fundus Photography       Left Eye - Please check Retinopathy or No Retinopathy        Exam did show retinopathy    Exam did not show retinopathy       Right Eye - Please check Retinopathy or No Retinopathy       Exam did show retinopathy    Exam did not show retinopathy       Comments __________________________________________________________    Practice Providing Exam ______________________________________________    Exam Performed By (print name) _______________________________________      Provider Signature ___________________________________________________      These reports are needed for  compliance. Please fax this completed form and a copy of the Diabetic Eye Exam report to our office located at [address] [place] [address] as soon as possible via Fax [phone] attention Kate Neumannkshire: Phone [phone]  We thank you for your assistance in treating our mutual patient.

## 2023-10-30 NOTE — TELEPHONE ENCOUNTER
----- Message from Ciara Mooney MA sent at 10/30/2023  9:24 AM EDT -----  Regarding: DM Optho exam  10/30/23 9:24 AM    Hello, our patient Josué Melvin has had Diabetic Eye Exam completed/performed. Please assist in updating the patient chart by making an External outreach to Dr Cristina Osorio facility located in [address] [place] [address]. The date of service is 2023.     Thank you,  [facility]

## 2023-10-30 NOTE — PROGRESS NOTES
Name: Grace Sesay      : 1945      MRN: [id number]  Encounter Provider: Kin Rogers MD  Encounter Date: 10/30/2023   Encounter department: [facility]    Assessment & Plan     1. Type 2 diabetes mellitus with stage 2 chronic kidney disease, without long-term current use of insulin   Comments:  Slight worsening questionably due to her recent COVID infection as well as her lifestyle changes during vacation. Cont same meds/diet ; repeat labs 3 months  Orders:  -     Hemoglobin A1C; Future    2. Mixed hyperlipidemia  Comments:  HDL low but rest of lipid profile fantastic therefore continue same medications and diet. Orders:  -     atorvastatin (LIPITOR) 20 mg tablet; Take 1 tablet (20 mg total) by mouth daily  -     Lipid panel; Future    3. Essential hypertension  Comments:  Controlled    4. Stage 3a chronic kidney disease ([address])  Comments:  Recent labs look good. She does have follow-up with nephro following this appointment today. cont good hydration;no NSAID    5. Coronary artery calcification seen on CT scan  Assessment & Plan:  As per cardiology. Now on baby aspirin. Discussed must continue to control blood pressure as well as cholesterol and sugar. She has follow-up with cardiology in 6 months. Depression Screening and Follow-up Plan: Patient was screened for depression during today's encounter. They screened negative with a PHQ-2 score of 0. Subjective      Patient cannot understand why her recent hemoglobin A1c is worse. States she is Yazidi about her diet and her exercise program being off somewhat during a 10-day vacation in [place]. Home sugars are averaging between 90 and 130,  regardless of time of day. She is taking the Sera just 5 mg daily which was prescribed by nephrology. Also using the Ozempic 0.25 mg daily weekly.   She is up-to-date with eye exams without any diabetic changes and she denies any problems with her feet following with a podiatrist regularly. She did have COVID about 1 month ago with moderate symptoms. She did not take the Paxlovid. He does plan to get the COVID-vaccine but of course now we will wait several months. She is up-to-date with all her other immunizations and testing. Review of Systems    Current Outpatient Medications on File Prior to Visit   Medication Sig   • allopurinol (ZYLOPRIM) 100 mg tablet    • aspirin (ECOTRIN LOW STRENGTH) 81 mg EC tablet Take 81 mg by mouth daily   • cholecalciferol (VITAMIN D3) 1,000 units tablet Take 2,000 Units by mouth daily   • citalopram (CeleXA) 10 mg tablet Take 10 mg by mouth   • clotrimazole-betamethasone (LOTRISONE) 1-0.05 % cream Apply topically 2 (two) times a day   • cycloSPORINE (RESTASIS) 0.05 % ophthalmic emulsion 1 drop 2 (two) times a day   • dapagliflozin (Farxiga) 10 MG tablet Take 1 tablet (10 mg total) by mouth daily (Patient taking differently: Take 5 mg by mouth daily)   • estradiol (ESTRACE) 0.1 mg/g vaginal cream 1 gram vaginally  weekly   • fluticasone (FLONASE) 50 mcg/act nasal spray    • glucose blood test strip TEST BLOOD SUGAR ONCE DIALY.    • levothyroxine 75 mcg tablet    • lisinopril-hydrochlorothiazide (PRINZIDE,ZESTORETIC) 10-12.5 MG per tablet    • Omega-3 Fatty Acids (FISH OIL) 645 MG CAPS Take by mouth   • semaglutide, 0.25 or 0.5 mg/dose, (Ozempic, 0.25 or 0.5 MG/DOSE,) 2 mg/1.5 mL injection pen Inject 0.25 mg under the skin every 7 days   • SUMAtriptan (IMITREX) 50 mg tablet Take by mouth   • Turmeric Curcumin 500 MG CAPS Take by mouth   • White Petrolatum-Mineral Oil (REFRESH LACRI-LUBE OP) Apply to eye   • [DISCONTINUED] aspirin 81 mg chewable tablet Chew 1 tablet (81 mg total) daily   • [DISCONTINUED] atorvastatin (LIPITOR) 20 mg tablet Take 1 tablet (20 mg total) by mouth daily   • [DISCONTINUED] doxycycline monohydrate (MONODOX) 100 mg capsule Take 100 mg by mouth daily (Patient not taking: Reported on 10/30/2023)   • clobetasol (TEMOVATE) 0.05 % cream Apply topically 2 (two) times a day for 14 days   • DOCOSAHEXAENOIC ACID-EPA PO Take by mouth   • meloxicam (MOBIC) 7.5 mg tablet Take 7.5 mg by mouth daily   • Probiotic Product (PROBIOTIC DAILY PO) Take by mouth   • [DISCONTINUED] Brimonidine Tartrate 0.025 % SOLN Apply to eye (Patient not taking: Reported on 9/19/2023)   • [DISCONTINUED] dapagliflozin (Farxiga) 5 MG TABS Take 5 mg by mouth daily (Patient not taking: Reported on 10/30/2023)   • [DISCONTINUED] fexofenadine (ALLEGRA) 180 MG tablet Take 180 mg by mouth daily (Patient not taking: Reported on 9/19/2023)   • [DISCONTINUED] metFORMIN (FORTAMET) 1000 MG (OSM) 24 hr tablet  (Patient not taking: Reported on 9/19/2023)   • [DISCONTINUED] methylPREDNISolone 4 MG tablet therapy pack Take as directed per package instructions. (Patient not taking: Reported on 10/30/2023)   • [DISCONTINUED] mometasone (ELOCON) 0.1 % cream Apply topically daily Apply topically BID x 2 weeks then qhs prn (Patient not taking: Reported on 9/19/2023)   • [DISCONTINUED] neomycin-polymyxin-hydrocortisone (CORTISPORIN) 0.35%-10,000 units/mL-1% otic suspension Administer into ears (Patient not taking: Reported on 10/14/2021)   • [DISCONTINUED] varenicline (CHANTIX) 0.5 mg tablet Take by mouth every 12 (twelve) hours (Patient not taking: Reported on 9/19/2023)       Objective     /62 (BP Location: Left arm, Patient Position: Sitting, Cuff Size: Large)   Temp (!) 97 °F (36.1 °C) (Tympanic)   Ht 5' 7" (1.702 m)   Wt 95.7 kg (211 lb)   BMI 33.05 kg/m²     Physical Exam  Constitutional:       Appearance: Normal appearance. Neck:      Vascular: No carotid bruit. Cardiovascular:      Rate and Rhythm: Normal rate and regular rhythm. Pulses: Normal pulses. no weak pulses          Dorsalis pedis pulses are 2+ on the right side and 2+ on the left side. Posterior tibial pulses are 2+ on the right side and 2+ on the left side.       Heart sounds: Normal heart sounds. No murmur heard. Pulmonary:      Effort: Pulmonary effort is normal.      Breath sounds: Normal breath sounds. Abdominal:      Palpations: Abdomen is soft. There is no mass. Tenderness: There is no abdominal tenderness. Musculoskeletal:      Right lower leg: No edema. Left lower leg: No edema. Feet:    Feet:      Right foot:      Skin integrity: Callus present. No ulcer, skin breakdown, erythema, warmth or dry skin. Left foot:      Skin integrity: Callus present. No ulcer, skin breakdown, erythema, warmth or dry skin. Lymphadenopathy:      Cervical: No cervical adenopathy. Skin:     General: Skin is warm and dry. Neurological:      General: No focal deficit present. Mental Status: She is oriented to person, place, and time. Psychiatric:         Behavior: Behavior normal.     Diabetic Foot Exam    Patient's shoes and socks removed. Right Foot/Ankle   Right Foot Inspection  Skin Exam: skin normal, skin intact, callus and callus. No dry skin, no warmth, no erythema, no maceration, no abnormal color, no pre-ulcer and no ulcer. Callus Size (cm):2    Toe Exam: ROM and strength within normal limits. Sensory   Vibration: intact  Proprioception: intact  Monofilament testing: intact    Vascular  Capillary refills: < 3 seconds  The right DP pulse is 2+. The right PT pulse is 2+. Left Foot/Ankle  Left Foot Inspection  Skin Exam: skin normal, skin intact and callus. No dry skin, no warmth, no erythema, no maceration, normal color, no pre-ulcer and no ulcer. Callus Size (cm): 2    Toe Exam: ROM and strength within normal limits. Sensory   Vibration: intact  Proprioception: intact  Monofilament testing: intact    Vascular  Capillary refills: < 3 seconds  The left DP pulse is 2+. The left PT pulse is 2+.      Assign Risk Category  No deformity present  No loss of protective sensation  No weak pulses  Risk: 0        Damion Roberson MD

## 2023-10-30 NOTE — ASSESSMENT & PLAN NOTE
Lab Results   Component Value Date    HGBA1C 6.1 (H) 10/26/2023   Followed by PCP   She has proteinuria which is controlled by Demetrio Reels 5mg and lisinopril daily  NO side effects with either medication

## 2023-10-30 NOTE — TELEPHONE ENCOUNTER
Upon review of the In Basket request and the patient's chart, initial outreach has been made via fax to facility. Please see Contacts section for details.      Thank you  David Peralta

## 2023-10-30 NOTE — LETTER
Diabetic Eye Exam Form    Date Requested: 23  Patient: Leopoldo Melnick  Patient : 1945   Referring Provider: Lacy Cobb MD      DIABETIC Eye Exam Date _______________________________      Type of Exam MUST be documented for Diabetic Eye Exams. Please CHECK ONE. Retinal Exam       Dilated Retinal Exam       OCT       Optomap-Iris Exam      Fundus Photography       Left Eye - Please check Retinopathy or No Retinopathy        Exam did show retinopathy    Exam did not show retinopathy       Right Eye - Please check Retinopathy or No Retinopathy       Exam did show retinopathy    Exam did not show retinopathy       Comments __________________________________________________________    Practice Providing Exam ______________________________________________    Exam Performed By (print name) _______________________________________      Provider Signature ___________________________________________________      These reports are needed for  compliance. Please fax this completed form and a copy of the Diabetic Eye Exam report to our office located at [address] [place] [address] as soon as possible via Fax [phone] attention Kathie Bonnie: Phone [phone]  We thank you for your assistance in treating our mutual patient.

## 2023-11-09 PROCEDURE — NC001 PR NO CHARGE: Performed by: OBSTETRICS & GYNECOLOGY

## 2023-11-09 NOTE — H&P
Assessment/Plan:         Diagnoses and all orders for this visit:     Endometrial polyp: Reviewed ultrasound findings with patient. Plan is to proceed with a hysteroscopy polypectomy. Procedure discussed         Subjective:       Patient ID: Maryam Harris is a 70 y.o. female. HPI patient recently had a ultrasound done secondary to some lower extremity swelling. The ultrasound revealed a thickened endometrium at 6 mm. Uterus measured 6.7 x 4.2 x 6.1 cm. The patient's had a prior RSO. Patient presented to our office for TVS/SIS/possible endometrial biopsy;  AMB US Pelvic Non OB     Date/Time: 8/16/2023 12:30 PM     Performed by: Gila Scott  Authorized by: Kristy Yang DO  Universal Protocol:  Consent given by: patient  Patient understanding: patient states understanding of the procedure being performed  Patient identity confirmed: verbally with patient        Procedure details:     Technique:  Transvaginal US, Non-OB    Position: lithotomy exam    Uterine findings:     Length (cm): 7.09    Height (cm):  4.13    Width (cm):  5.99    Endometrial stripe: identified      Endometrium thickness (mm):  8.41  Left ovary findings:     Left ovary:  Not visualized  Right ovary findings:     Right ovary:  Not visualized  Other findings:     Free pelvic fluid: not identified      Free peritoneal fluid: not identified    Post-Procedure Details:     Impression:  Anteverted uterus is inhomogeneous throughout without fibroids. The endometrium is thickened for post menopausal patient. The bilateral ovaries are not seen, likely due to atrophic changes along with overlying bowel gas. One ovary has been removed, however patient is uncertain which one No free fluid. Tolerance:   Tolerated well, no immediate complications    Complications: no complications    Additional Procedure Comments:      GE Voluson P8 transvaginal transducer RIC5-RA with Serial Number [id number] was used during procedure and subsequently cleaned with high level disinfection utilizing the [employer]. Ultrasound performed at:      [facility] for [address]  [address] [facility] [address] [facility] [address]  [address] [facility], [address] [place] [address]  Phone: [phone]  Fax:  [phone]     Sonohysterogram     Date/Time: 8/16/2023 12:30 PM     Performed by: Yanelis Shine DO  Authorized by: Yanelis Shine DO  Universal Protocol:  Consent given by: patient  Patient understanding: patient states understanding of the procedure being performed  Patient identity confirmed: verbally with patient        Pre-procedure:     Prepped with: Betadine    Procedure:     Cervix cleaned and prepped: yes      Uterus sounded: yes      Catheter inserted: yes      Uterine cavity distended with saline: yes    Post-procedure:     Patient observed: yes      Post procedure instructions given to patient: yes      Patient tolerated procedure well with no complications: yes    Comments:      Sonohysterogram demonstrates a 1.8cm polyp within the endometrium.    Endometrial biopsy     Date/Time: 8/16/2023 12:30 PM     Performed by: Yanelis Shine DO  Authorized by: Yanelis Shine DO  Universal Protocol:  Consent given by: patient  Patient understanding: patient states understanding of the procedure being performed  Patient identity confirmed: verbally with patient        Procedure:     Procedure: endometrial biopsy with Pipelle      A bivalve speculum was placed in the vagina: yes      Cervix cleaned and prepped: yes      Specimen collected: specimen collected and sent to pathology  : benign    Patient tolerated procedure well with no complications: yes             The following portions of the patient's history were reviewed and updated as appropriate:   She  has a past medical history of Allergic rhinitis, Anxiety, Celiac disease, Cerebrovascular disease, Colon polyp, Diabetes mellitus ([address]), Disease of thyroid gland, Diverticulosis of colon, Dry eye, Gout, Hiatal hernia, Hypertension, Hypothyroidism, Nephrolithiasis, Perennial allergic rhinitis, Peripheral venous insufficiency, Rosacea conjunctivitis, bilateral, Sleep apnea, and Vitamin D deficiency. She        Patient Active Problem List     Diagnosis Date Noted    Type 2 diabetes mellitus with stage 2 chronic kidney disease, without long-term current use of insulin ([address]) 07/17/2023    Stage 3a chronic kidney disease ([address]) 07/17/2023    Proteinuria 04/13/2023    Prediabetes 04/13/2023    Morbid (severe) obesity due to excess calories ([address]) 04/13/2023    Renal cyst 11/18/2021    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 12/02/2019    Varicose veins of both lower extremities with complications 39/27/2644    DEEPAK on CPAP 08/08/2019    Nephrolithiasis 01/25/2019    Hiatal hernia 06/16/2018    Polyp of colon 08/16/2015    Diverticulosis of colon 07/16/2015    Celiac disease 05/15/2014    Cerebrovascular disease 05/15/2014    Essential hypertension 05/15/2014    Gout 05/15/2014    Hypothyroidism 05/15/2014    Type 2 diabetes mellitus ([address]) 05/15/2014    Vitamin D deficiency 05/15/2014      She  has a past surgical history that includes Cholecystectomy; Varicose vein surgery; Tonsillectomy; and Cataract extraction, bilateral (01/2020). Her family history includes Diabetes in her father and sister; Heart disease in her brother, father, mother, and sister; No Known Problems in her daughter, daughter, maternal aunt, maternal grandfather, maternal grandmother, paternal aunt, paternal aunt, paternal aunt, paternal aunt, paternal grandfather, and paternal grandmother. She  reports that she has never smoked. She has never used smokeless tobacco. She reports that she does not drink alcohol and does not use drugs.          Current Outpatient Medications   Medication Sig Dispense Refill    allopurinol (ZYLOPRIM) 100 mg tablet          ascorbic acid (VITAMIN C) 500 mg tablet Take 500 mg by mouth daily        atorvastatin (LIPITOR) 20 mg tablet Take 1 tablet (20 mg total) by mouth daily 30 tablet 5    Brimonidine Tartrate 0.025 % SOLN Apply to eye        cholecalciferol (VITAMIN D3) 1,000 units tablet Take 2,000 Units by mouth daily        citalopram (CeleXA) 10 mg tablet Take 10 mg by mouth   3    clobetasol (TEMOVATE) 0.05 % cream Apply topically 2 (two) times a day for 14 days 30 g 3    cycloSPORINE (RESTASIS) 0.05 % ophthalmic emulsion 1 drop 2 (two) times a day        dapagliflozin (Farxiga) 10 MG tablet Take 1 tablet (10 mg total) by mouth daily (Patient not taking: Reported on 7/17/2023) 90 tablet 3    dapagliflozin (Farxiga) 5 MG TABS Take 5 mg by mouth daily        DOCOSAHEXAENOIC ACID-EPA PO Take by mouth        doxycycline monohydrate (MONODOX) 100 mg capsule Take 100 mg by mouth daily        estradiol (ESTRACE) 0.1 mg/g vaginal cream 1 gram vaginally  weekly 42.5 g 3    fexofenadine (ALLEGRA) 180 MG tablet Take 180 mg by mouth daily        fluticasone (FLONASE) 50 mcg/act nasal spray     2    glucose blood test strip TEST BLOOD SUGAR ONCE DIALY. levothyroxine 75 mcg tablet          lisinopril-hydrochlorothiazide (PRINZIDE,ZESTORETIC) 10-12.5 MG per tablet          meloxicam (MOBIC) 7.5 mg tablet Take 7.5 mg by mouth daily        metFORMIN (FORTAMET) 1000 MG (OSM) 24 hr tablet          methylPREDNISolone 4 MG tablet therapy pack Take as directed per package instructions.         mometasone (ELOCON) 0.1 % cream Apply topically daily Apply topically BID x 2 weeks then qhs prn 45 g 2    neomycin-polymyxin-hydrocortisone (CORTISPORIN) 0.35%-10,000 units/mL-1% otic suspension Administer into ears (Patient not taking: Reported on 10/14/2021)        Omega-3 Fatty Acids (FISH OIL) 645 MG CAPS Take by mouth        Probiotic Product (PROBIOTIC DAILY PO) Take by mouth        semaglutide, 0.25 or 0.5 mg/dose, (Ozempic, 0.25 or 0.5 MG/DOSE,) 2 mg/3 mL injection pen Inject 0.38 mL (0.2533 mg total) under the skin every 7 days for 28 days, THEN 0.75 mL (0.5 mg total) every 7 days. 3 mL 5    SUMAtriptan (IMITREX) 50 mg tablet Take by mouth        Turmeric Curcumin 500 MG CAPS Take by mouth        varenicline (CHANTIX) 0.5 mg tablet Take by mouth every 12 (twelve) hours        White Petrolatum-Mineral Oil (REFRESH LACRI-LUBE OP) Apply to eye          No current facility-administered medications for this visit. Current Outpatient Medications on File Prior to Visit   Medication Sig    allopurinol (ZYLOPRIM) 100 mg tablet      ascorbic acid (VITAMIN C) 500 mg tablet Take 500 mg by mouth daily    atorvastatin (LIPITOR) 20 mg tablet Take 1 tablet (20 mg total) by mouth daily    Brimonidine Tartrate 0.025 % SOLN Apply to eye    cholecalciferol (VITAMIN D3) 1,000 units tablet Take 2,000 Units by mouth daily    citalopram (CeleXA) 10 mg tablet Take 10 mg by mouth    clobetasol (TEMOVATE) 0.05 % cream Apply topically 2 (two) times a day for 14 days    cycloSPORINE (RESTASIS) 0.05 % ophthalmic emulsion 1 drop 2 (two) times a day    dapagliflozin (Farxiga) 10 MG tablet Take 1 tablet (10 mg total) by mouth daily (Patient not taking: Reported on 7/17/2023)    dapagliflozin (Farxiga) 5 MG TABS Take 5 mg by mouth daily    DOCOSAHEXAENOIC ACID-EPA PO Take by mouth    doxycycline monohydrate (MONODOX) 100 mg capsule Take 100 mg by mouth daily    estradiol (ESTRACE) 0.1 mg/g vaginal cream 1 gram vaginally  weekly    fexofenadine (ALLEGRA) 180 MG tablet Take 180 mg by mouth daily    fluticasone (FLONASE) 50 mcg/act nasal spray      glucose blood test strip TEST BLOOD SUGAR ONCE DIALY. levothyroxine 75 mcg tablet      lisinopril-hydrochlorothiazide (PRINZIDE,ZESTORETIC) 10-12.5 MG per tablet      meloxicam (MOBIC) 7.5 mg tablet Take 7.5 mg by mouth daily    metFORMIN (FORTAMET) 1000 MG (OSM) 24 hr tablet      methylPREDNISolone 4 MG tablet therapy pack Take as directed per package instructions.     mometasone (ELOCON) 0.1 % cream Apply topically daily Apply topically BID x 2 weeks then qhs prn    neomycin-polymyxin-hydrocortisone (CORTISPORIN) 0.35%-10,000 units/mL-1% otic suspension Administer into ears (Patient not taking: Reported on 10/14/2021)    Omega-3 Fatty Acids (FISH OIL) 645 MG CAPS Take by mouth    Probiotic Product (PROBIOTIC DAILY PO) Take by mouth    semaglutide, 0.25 or 0.5 mg/dose, (Ozempic, 0.25 or 0.5 MG/DOSE,) 2 mg/3 mL injection pen Inject 0.38 mL (0.2533 mg total) under the skin every 7 days for 28 days, THEN 0.75 mL (0.5 mg total) every 7 days. SUMAtriptan (IMITREX) 50 mg tablet Take by mouth    Turmeric Curcumin 500 MG CAPS Take by mouth    varenicline (CHANTIX) 0.5 mg tablet Take by mouth every 12 (twelve) hours    White Petrolatum-Mineral Oil (REFRESH LACRI-LUBE OP) Apply to eye      No current facility-administered medications on file prior to visit. She is allergic to gluten meal - food allergy, penicillin g, wheat bran - food allergy, diclofenac, diclofenac sodium, and penicillins. .     Review of Systems  Constitutional: Negative. Gastrointestinal: Negative. Genitourinary: Negative. Objective: There were no vitals taken for this visit. Physical Exam  Vitals reviewed. Cardiovascular:      Rate and Rhythm: Normal rate and regular rhythm. Pulses: Normal pulses. Heart sounds: Normal heart sounds. Pulmonary:      Effort: Pulmonary effort is normal.      Breath sounds: Normal breath sounds. Abdominal:      General: There is no distension. Palpations: Abdomen is soft. There is no mass. Tenderness: There is no abdominal tenderness. There is no guarding or rebound. Hernia: No hernia is present. There is no hernia in the left inguinal area or right inguinal area. Genitourinary:     General: Normal vulva. Labia:         Right: No rash, tenderness or lesion. Left: No rash, tenderness or lesion.        Vagina: Normal.      Cervix: Normal.      Uterus: Normal. Adnexa:         Right: No mass, tenderness or fullness. Left: No mass, tenderness or fullness. Lymphadenopathy:      Lower Body: No right inguinal adenopathy. No left inguinal adenopathy. Neurological:      Mental Status: She is alert.

## 2023-11-20 ENCOUNTER — OFFICE VISIT (OUTPATIENT)
Dept: LAB | Facility: HOSPITAL | Age: 78
End: 2023-11-20
Payer: MEDICARE

## 2023-11-20 ENCOUNTER — APPOINTMENT (OUTPATIENT)
Dept: LAB | Facility: HOSPITAL | Age: 78
End: 2023-11-20
Payer: MEDICARE

## 2023-11-20 DIAGNOSIS — E11.9 TYPE 2 DIABETES MELLITUS WITHOUT COMPLICATION, WITHOUT LONG-TERM CURRENT USE OF INSULIN (HCC): Primary | ICD-10-CM

## 2023-11-20 DIAGNOSIS — Z01.818 PRE-OP TESTING: ICD-10-CM

## 2023-11-20 DIAGNOSIS — N18.2 TYPE 2 DIABETES MELLITUS WITH STAGE 2 CHRONIC KIDNEY DISEASE, WITHOUT LONG-TERM CURRENT USE OF INSULIN: ICD-10-CM

## 2023-11-20 DIAGNOSIS — E78.2 MIXED HYPERLIPIDEMIA: ICD-10-CM

## 2023-11-20 DIAGNOSIS — E11.22 TYPE 2 DIABETES MELLITUS WITH STAGE 2 CHRONIC KIDNEY DISEASE, WITHOUT LONG-TERM CURRENT USE OF INSULIN: ICD-10-CM

## 2023-11-20 LAB
ATRIAL RATE: 69 BPM
CHOLEST SERPL-MCNC: 96 MG/DL
EST. AVERAGE GLUCOSE BLD GHB EST-MCNC: 128 MG/DL
HBA1C MFR BLD: 6.1 %
HDLC SERPL-MCNC: 39 MG/DL
LDLC SERPL CALC-MCNC: 40 MG/DL (ref 0–100)
NONHDLC SERPL-MCNC: 57 MG/DL
P AXIS: 69 DEGREES
PR INTERVAL: 194 MS
QRS AXIS: 7 DEGREES
QRSD INTERVAL: 90 MS
QT INTERVAL: 424 MS
QTC INTERVAL: 454 MS
T WAVE AXIS: 18 DEGREES
TRIGL SERPL-MCNC: 86 MG/DL
VENTRICULAR RATE: 69 BPM

## 2023-11-20 PROCEDURE — 80061 LIPID PANEL: CPT

## 2023-11-20 PROCEDURE — 83036 HEMOGLOBIN GLYCOSYLATED A1C: CPT

## 2023-11-20 PROCEDURE — 36415 COLL VENOUS BLD VENIPUNCTURE: CPT

## 2023-11-20 PROCEDURE — 93010 ELECTROCARDIOGRAM REPORT: CPT | Performed by: INTERNAL MEDICINE

## 2023-11-20 PROCEDURE — 93005 ELECTROCARDIOGRAM TRACING: CPT

## 2023-11-21 NOTE — TELEPHONE ENCOUNTER
As a follow-up, a second attempt has been made for outreach via fax to facility. Please see Contacts section for details.     Thank you  Edith Mak

## 2023-11-28 DIAGNOSIS — Z01.818 PRE-OP EXAM: Primary | ICD-10-CM

## 2023-11-28 RX ORDER — BRIMONIDINE TARTRATE 0.25 MG/ML
1 SOLUTION/ DROPS OPHTHALMIC 2 TIMES DAILY
COMMUNITY

## 2023-11-28 RX ORDER — AMPICILLIN TRIHYDRATE 250 MG
500 CAPSULE ORAL DAILY
COMMUNITY

## 2023-11-28 RX ORDER — VIT A/VIT C/VIT E/ZINC/COPPER 4296-226
1 CAPSULE ORAL 2 TIMES DAILY
COMMUNITY

## 2023-11-28 RX ORDER — MOMETASONE FUROATE 1 MG/G
1 CREAM TOPICAL WEEKLY
COMMUNITY

## 2023-11-28 RX ORDER — FEXOFENADINE HCL 180 MG/1
180 TABLET ORAL DAILY
COMMUNITY

## 2023-11-28 NOTE — PRE-PROCEDURE INSTRUCTIONS
Pre-Surgery Instructions:   Medication Instructions    allopurinol (ZYLOPRIM) 100 mg tablet Take day of surgery. aspirin (ECOTRIN LOW STRENGTH) 81 mg EC tablet Stop taking 7 days prior to surgery. Had last dose 11/17    atorvastatin (LIPITOR) 20 mg tablet Take day of surgery. Brimonidine Tartrate (Lumify) 0.025 % SOLN Take day of surgery. cholecalciferol (VITAMIN D3) 1,000 units tablet Hold day of surgery. Cinnamon 500 MG capsule Hold day of surgery. citalopram (CeleXA) 10 mg tablet Take day of surgery. clobetasol (TEMOVATE) 0.05 % cream Hold day of surgery. clotrimazole-betamethasone (LOTRISONE) 1-0.05 % cream Hold day of surgery. cycloSPORINE (RESTASIS) 0.05 % ophthalmic emulsion Take day of surgery. dapagliflozin (Farxiga) 10 MG tablet Stop taking 4 days prior to surgery. Last dose is 11/28/23    estradiol (ESTRACE) 0.1 mg/g vaginal cream Weekly on Sunday    fexofenadine (ALLEGRA) 180 MG tablet Take night before surgery    fluticasone (FLONASE) 50 mcg/act nasal spray Uses PRN- OK to take day of surgery    glucose blood test strip Take day of surgery. levothyroxine 75 mcg tablet Take day of surgery. lisinopril-hydrochlorothiazide (PRINZIDE,ZESTORETIC) 10-12.5 MG per tablet Hold day of surgery. mometasone (ELOCON) 0.1 % cream Hold day of surgery. Multiple Vitamins-Minerals (PreserVision AREDS) CAPS Hold day of surgery. Omega-3 Fatty Acids (FISH OIL) 645 MG CAPS Stop taking 7 days prior to surgery. Last dose 11/27/23    Probiotic Product (PROBIOTIC DAILY PO) Stop taking 7 days prior to surgery. semaglutide, 0.25 or 0.5 mg/dose, (Ozempic, 0.25 or 0.5 MG/DOSE,) 2 mg/1.5 mL injection pen Stop taking 7 days prior to surgery. Last dose 11/26/23    SUMAtriptan (IMITREX) 50 mg tablet Uses PRN- OK to take day of surgery    Turmeric Curcumin 500 MG CAPS Stop taking 7 days prior to surgery. White Petrolatum-Mineral Oil (REFRESH LACRI-LUBE OP) Take day of surgery.    Medication instructions for day surgery reviewed. Please use only a sip of water to take your instructed medications. Avoid all over the counter vitamins, supplements and NSAIDS for one week prior to surgery per anesthesia guidelines. Tylenol is ok to take as needed. You will receive a call one business day prior to surgery with an arrival time and hospital directions. If your surgery is scheduled on a Monday, the hospital will be calling you on the Friday prior to your surgery. If you have not heard from anyone by 8pm, please call the hospital supervisor through the hospital  at [phone]. Chris Ulloa [phone]). Do not eat or drink anything after midnight the night before your surgery, including candy, mints, lifesavers, or chewing gum. Do not drink alcohol 24hrs before your surgery. Try not to smoke at least 24hrs before your surgery. Follow the pre surgery showering instructions as listed in the [facility] Surgical Experience Booklet” or otherwise provided by your surgeon's office. Do not use a blade to shave the surgical area 1 week before surgery. It is okay to use a clean electric clippers up to 24 hours before surgery. Do not apply any lotions, creams, including makeup, cologne, deodorant, or perfumes after showering on the day of your surgery. Do not use dry shampoo, hair spray, hair gel, or any type of hair products. No contact lenses, eye make-up, or artificial eyelashes. Remove nail polish, including gel polish, and any artificial, gel, or acrylic nails if possible. Remove all jewelry including rings and body piercing jewelry. Wear causal clothing that is easy to take on and off. Consider your type of surgery. Keep any valuables, jewelry, piercings at home. Please bring any specially ordered equipment (sling, braces) if indicated. Arrange for a responsible person to drive you to and from the hospital on the day of your surgery. Visitor Guidelines discussed.      Call the surgeon's office with any new illnesses, exposures, or additional questions prior to surgery. Please reference your [facility] Surgical Experience Booklet” for additional information to prepare for your upcoming surgery.

## 2023-11-29 ENCOUNTER — APPOINTMENT (OUTPATIENT)
Dept: LAB | Facility: HOSPITAL | Age: 78
End: 2023-11-29
Payer: MEDICARE

## 2023-11-29 DIAGNOSIS — Z01.818 PRE-OP EXAM: ICD-10-CM

## 2023-11-29 LAB
ERYTHROCYTE [DISTWIDTH] IN BLOOD BY AUTOMATED COUNT: 13.3 % (ref 11.6–15.1)
HCT VFR BLD AUTO: 47.3 % (ref 34.8–46.1)
HGB BLD-MCNC: 14.9 G/DL (ref 11.5–15.4)
MCH RBC QN AUTO: 30.9 PG (ref 26.8–34.3)
MCHC RBC AUTO-ENTMCNC: 31.5 G/DL (ref 31.4–37.4)
MCV RBC AUTO: 98 FL (ref 82–98)
PLATELET # BLD AUTO: 235 THOUSANDS/UL (ref 149–390)
PMV BLD AUTO: 10 FL (ref 8.9–12.7)
RBC # BLD AUTO: 4.82 MILLION/UL (ref 3.81–5.12)
WBC # BLD AUTO: 6.2 THOUSAND/UL (ref 4.31–10.16)

## 2023-11-29 PROCEDURE — 36415 COLL VENOUS BLD VENIPUNCTURE: CPT

## 2023-11-29 PROCEDURE — 85027 COMPLETE CBC AUTOMATED: CPT

## 2023-11-29 NOTE — TELEPHONE ENCOUNTER
Upon review of the In Basket request we were able to locate, review, and update the patient chart as requested for Diabetic Eye Exam.    Any additional questions or concerns should be emailed to the Practice Liaisons via the appropriate education email address, please do not reply via In Basket.     Thank you  Nick Weathers

## 2023-12-01 ENCOUNTER — ANESTHESIA EVENT (OUTPATIENT)
Dept: PERIOP | Facility: HOSPITAL | Age: 78
End: 2023-12-01
Payer: MEDICARE

## 2023-12-04 ENCOUNTER — ANESTHESIA (OUTPATIENT)
Dept: PERIOP | Facility: HOSPITAL | Age: 78
End: 2023-12-04
Payer: MEDICARE

## 2023-12-04 ENCOUNTER — HOSPITAL ENCOUNTER (OUTPATIENT)
Facility: HOSPITAL | Age: 78
Setting detail: OUTPATIENT SURGERY
Discharge: HOME | End: 2023-12-04
Attending: OBSTETRICS & GYNECOLOGY | Admitting: OBSTETRICS & GYNECOLOGY
Payer: MEDICARE

## 2023-12-04 VITALS
RESPIRATION RATE: 16 BRPM | HEIGHT: 67 IN | SYSTOLIC BLOOD PRESSURE: 171 MMHG | HEART RATE: 57 BPM | BODY MASS INDEX: 32.32 KG/M2 | WEIGHT: 205.91 LBS | DIASTOLIC BLOOD PRESSURE: 79 MMHG | TEMPERATURE: 97.8 F | OXYGEN SATURATION: 100 %

## 2023-12-04 DIAGNOSIS — N84.0 ENDOMETRIAL POLYP: ICD-10-CM

## 2023-12-04 DIAGNOSIS — R93.89 ENDOMETRIAL STRIPE INCREASED: ICD-10-CM

## 2023-12-04 LAB — GLUCOSE SERPL-MCNC: 92 MG/DL (ref 65–140)

## 2023-12-04 PROCEDURE — 82948 REAGENT STRIP/BLOOD GLUCOSE: CPT

## 2023-12-04 PROCEDURE — 88305 TISSUE EXAM BY PATHOLOGIST: CPT | Performed by: PATHOLOGY

## 2023-12-04 PROCEDURE — 58558 HYSTEROSCOPY BIOPSY: CPT | Performed by: OBSTETRICS & GYNECOLOGY

## 2023-12-04 RX ORDER — ACETAMINOPHEN 325 MG/1
975 TABLET ORAL EVERY 6 HOURS PRN
Status: DISCONTINUED | OUTPATIENT
Start: 2023-12-04 | End: 2023-12-04 | Stop reason: HOSPADM

## 2023-12-04 RX ORDER — MAGNESIUM HYDROXIDE 1200 MG/15ML
LIQUID ORAL AS NEEDED
Status: DISCONTINUED | OUTPATIENT
Start: 2023-12-04 | End: 2023-12-04 | Stop reason: HOSPADM

## 2023-12-04 RX ORDER — SODIUM CHLORIDE 9 MG/ML
125 INJECTION, SOLUTION INTRAVENOUS CONTINUOUS
Status: DISCONTINUED | OUTPATIENT
Start: 2023-12-04 | End: 2023-12-04 | Stop reason: HOSPADM

## 2023-12-04 RX ORDER — FENTANYL CITRATE/PF 50 MCG/ML
25 SYRINGE (ML) INJECTION
Status: DISCONTINUED | OUTPATIENT
Start: 2023-12-04 | End: 2023-12-04 | Stop reason: HOSPADM

## 2023-12-04 RX ORDER — LIDOCAINE HYDROCHLORIDE 20 MG/ML
INJECTION, SOLUTION EPIDURAL; INFILTRATION; INTRACAUDAL; PERINEURAL AS NEEDED
Status: DISCONTINUED | OUTPATIENT
Start: 2023-12-04 | End: 2023-12-04

## 2023-12-04 RX ORDER — PROPOFOL 10 MG/ML
INJECTION, EMULSION INTRAVENOUS AS NEEDED
Status: DISCONTINUED | OUTPATIENT
Start: 2023-12-04 | End: 2023-12-04

## 2023-12-04 RX ORDER — ONDANSETRON 2 MG/ML
4 INJECTION INTRAMUSCULAR; INTRAVENOUS EVERY 6 HOURS PRN
Status: DISCONTINUED | OUTPATIENT
Start: 2023-12-04 | End: 2023-12-04 | Stop reason: HOSPADM

## 2023-12-04 RX ORDER — MIDAZOLAM HYDROCHLORIDE 2 MG/2ML
INJECTION, SOLUTION INTRAMUSCULAR; INTRAVENOUS AS NEEDED
Status: DISCONTINUED | OUTPATIENT
Start: 2023-12-04 | End: 2023-12-04

## 2023-12-04 RX ORDER — FENTANYL CITRATE 50 UG/ML
INJECTION, SOLUTION INTRAMUSCULAR; INTRAVENOUS AS NEEDED
Status: DISCONTINUED | OUTPATIENT
Start: 2023-12-04 | End: 2023-12-04

## 2023-12-04 RX ORDER — ONDANSETRON 2 MG/ML
INJECTION INTRAMUSCULAR; INTRAVENOUS AS NEEDED
Status: DISCONTINUED | OUTPATIENT
Start: 2023-12-04 | End: 2023-12-04

## 2023-12-04 RX ORDER — ONDANSETRON 2 MG/ML
4 INJECTION INTRAMUSCULAR; INTRAVENOUS ONCE AS NEEDED
Status: DISCONTINUED | OUTPATIENT
Start: 2023-12-04 | End: 2023-12-04 | Stop reason: HOSPADM

## 2023-12-04 RX ADMIN — ONDANSETRON 4 MG: 2 INJECTION INTRAMUSCULAR; INTRAVENOUS at 11:24

## 2023-12-04 RX ADMIN — ACETAMINOPHEN 325MG 975 MG: 325 TABLET ORAL at 12:49

## 2023-12-04 RX ADMIN — LIDOCAINE HYDROCHLORIDE 100 MG: 20 INJECTION, SOLUTION EPIDURAL; INFILTRATION; INTRACAUDAL at 11:19

## 2023-12-04 RX ADMIN — MIDAZOLAM 1 MG: 1 INJECTION INTRAMUSCULAR; INTRAVENOUS at 11:16

## 2023-12-04 RX ADMIN — FENTANYL CITRATE 25 MCG: 50 INJECTION INTRAMUSCULAR; INTRAVENOUS at 11:24

## 2023-12-04 RX ADMIN — FENTANYL CITRATE 25 MCG: 50 INJECTION INTRAMUSCULAR; INTRAVENOUS at 11:31

## 2023-12-04 RX ADMIN — PROPOFOL 200 MG: 10 INJECTION, EMULSION INTRAVENOUS at 11:19

## 2023-12-04 RX ADMIN — MIDAZOLAM 1 MG: 1 INJECTION INTRAMUSCULAR; INTRAVENOUS at 11:13

## 2023-12-04 RX ADMIN — SODIUM CHLORIDE 125 ML/HR: 0.9 INJECTION, SOLUTION INTRAVENOUS at 10:40

## 2023-12-04 RX ADMIN — SODIUM CHLORIDE 125 ML/HR: 0.9 INJECTION, SOLUTION INTRAVENOUS at 09:37

## 2023-12-04 NOTE — INTERVAL H&P NOTE
H&P reviewed. After examining the patient I find no changes in the patients condition since the H&P had been written.     Vitals:    12/04/23 0924   BP: 168/71   Pulse: 62   Resp: 16   Temp: 97.8 °F (36.6 °C)   SpO2: 97%

## 2023-12-04 NOTE — OP NOTE
OPERATIVE REPORT  PATIENT NAME: Hayde Beverly    :  1945  MRN: [id number]  Pt Location: [facility] OR ROOM 01    SURGERY DATE: 2023    Surgeon(s) and Role:     Frandy Fontana DO - Primary     * Carlos Mccabe MD - Assisting     * Aleks Corona MD - Fellow    Preop Diagnosis:  Endometrial stripe increased [R93.89]  Endometrial polyp [N84.0]    Post-Op Diagnosis Codes:     * Endometrial stripe increased [R93.89]     * Endometrial polyp [N84.0]    Procedure(s):  DILATATION AND CURETTAGE (D&C) WITH HYSTEROSCOPY WITH POLYPECTOMY    Specimen(s):  ID Type Source Tests Collected by Time Destination   1 : polyp, endometrial Tissue Polyp, Cervical/Endometrial TISSUE Lavon Lopez DO 2023 1133        Estimated Blood Loss:   Minimal    Drains:  None     Anesthesia Type:   General LMA    Operative Indications:  Endometrial stripe increased [R93.89]  Endometrial polyp [N84.0]    Operative Findings:  1. External genitalia grossly normal in appearance. No ulcerations, no lacerations, no lesions. Bimanual exam revealed retroverted uterus with normal contours and freely mobile. No adnexal masses palpated bilaterally. 2.  Vagina and cervix were grossly normal in appearance without any lacerations or lesions. 3. Uterus sounded to 7 cm. 4. Hysteroscopic examination revealed scant endometrial lining. 2 cm polyp was noted at 9 o'clock postion. Bilateral ostia were visualized. Complications:   None apparent    Procedure and Technique:  The patient was taken to the operating room where a time out was performed to confirm correct patient and correct procedure. General LMA anesthesia (LMA) was administered and the patient was positioned on the OR table in the dorsal lithotomy position. All pressure points were padded and a aiden hugger was placed to maintain control of core body temperature. The patient was prepped and draped in the usual sterile fashion.     A straight catheter was introduced into the bladder, which was drained of 200 cc of clear yellow urine. A weighted speculum was inserted into the vagina and a Alfa retractor was used to visualize the anterior lip of the cervix, which was then grasped with a single toothed tenaculum. The uterus was sounded to 7 cm. The cervix was serially dilated to 17 [place] using antoine dilators for introduction of the hysteroscope. Sympion hysteroscope was introduced under direct visualization using normal saline solution as the distention media. Hysteroscope was advanced to the uterine fundus and the entire uterine cavity was inspected in a systematic manner with findings as described above. Symphion resection device was then inserted and resection of the polyp was undertaken and noted to be adequate. Polyp was sent for pathology. Hysteroscope was withdrawn. The single toothed tenaculum was removed from the anterior lip of the cervix. Good hemostasis was confirmed at the tenaculum puncture sites. Weighted speculum was then removed from the vagina. At the conclusion of the procedure, all needle, sponge, and instrument counts were noted to be correct x2. A fluid deficit of 250 cc was noted. Dr. Abel Conde was present and participated in all key portions of the case.      Patient Disposition:  PACU         SIGNATURE: Isaias Gracia MD  DATE: December 4, 2023  TIME: 11:40 AM

## 2023-12-04 NOTE — ANESTHESIA POSTPROCEDURE EVALUATION
Post-Op Assessment Note    CV Status:  Stable  Pain Score: 1    Pain management: adequate       Mental Status:  Alert and awake   Hydration Status:  Euvolemic   PONV Controlled:  Controlled   Airway Patency:  Patent     Post Op Vitals Reviewed: Yes    No anethesia notable event occurred.     Staff: Anesthesiologist               /60 (12/04/23 1212)    Temp 97.5 °F (36.4 °C) (12/04/23 1212)    Pulse 58 (12/04/23 1212)   Resp 14 (12/04/23 1212)    SpO2 96 % (12/04/23 1212)

## 2023-12-04 NOTE — ANESTHESIA PREPROCEDURE EVALUATION
Procedure:  DILATATION AND CURETTAGE (D&C) WITH HYSTEROSCOPY WITH POLYPECTOMY (Uterus)    Relevant Problems   CARDIO   (+) Essential hypertension   (+) Mixed hyperlipidemia      ENDO   (+) Hypothyroidism   (+) Type 2 diabetes mellitus (HCC)   (+) Type 2 diabetes mellitus with stage 2 chronic kidney disease, without long-term current use of insulin       GI/HEPATIC   (+) Hiatal hernia      /RENAL   (+) Nephrolithiasis   (+) Renal cyst   (+) Stage 3a chronic kidney disease (HCC)      MUSCULOSKELETAL   (+) Chondromalacia patellae   (+) Gout   (+) Hiatal hernia      PULMONARY   (+) DEEPAK on CPAP        Physical Exam    Airway    Mallampati score: III  TM Distance: >3 FB  Neck ROM: full     Dental   No notable dental hx     Cardiovascular  Rhythm: regular, Rate: normal, Cardiovascular exam normal    Pulmonary  Pulmonary exam normal Breath sounds clear to auscultation    Other Findings  post-pubertal.      Anesthesia Plan  ASA Score- 2     Anesthesia Type- general with ASA Monitors. Additional Monitors:     Airway Plan: LMA. Plan Factors-    Chart reviewed. Patient summary reviewed. Patient is not a current smoker. Patient instructed to abstain from smoking on day of procedure. Induction- intravenous. Postoperative Plan-     Informed Consent- Anesthetic plan and risks discussed with patient and spouse.

## 2023-12-11 PROCEDURE — 88305 TISSUE EXAM BY PATHOLOGIST: CPT | Performed by: PATHOLOGY

## 2023-12-11 RX ORDER — SEMAGLUTIDE 0.68 MG/ML
0.25 INJECTION, SOLUTION SUBCUTANEOUS
COMMUNITY
Start: 2023-11-06

## 2023-12-12 ENCOUNTER — OFFICE VISIT (OUTPATIENT)
Dept: PODIATRY | Facility: CLINIC | Age: 78
End: 2023-12-12
Payer: MEDICARE

## 2023-12-12 VITALS
DIASTOLIC BLOOD PRESSURE: 78 MMHG | OXYGEN SATURATION: 97 % | SYSTOLIC BLOOD PRESSURE: 130 MMHG | BODY MASS INDEX: 32.62 KG/M2 | HEART RATE: 70 BPM | WEIGHT: 207.8 LBS | HEIGHT: 67 IN

## 2023-12-12 DIAGNOSIS — B35.1 ONYCHOMYCOSIS: ICD-10-CM

## 2023-12-12 DIAGNOSIS — E11.22 TYPE 2 DIABETES MELLITUS WITH STAGE 2 CHRONIC KIDNEY DISEASE, WITHOUT LONG-TERM CURRENT USE OF INSULIN: Primary | ICD-10-CM

## 2023-12-12 DIAGNOSIS — F34.1 PERSISTENT DEPRESSIVE DISORDER: Primary | ICD-10-CM

## 2023-12-12 DIAGNOSIS — N18.2 TYPE 2 DIABETES MELLITUS WITH STAGE 2 CHRONIC KIDNEY DISEASE, WITHOUT LONG-TERM CURRENT USE OF INSULIN: Primary | ICD-10-CM

## 2023-12-12 PROCEDURE — 99202 OFFICE O/P NEW SF 15 MIN: CPT | Performed by: STUDENT IN AN ORGANIZED HEALTH CARE EDUCATION/TRAINING PROGRAM

## 2023-12-12 PROCEDURE — 11721 DEBRIDE NAIL 6 OR MORE: CPT | Performed by: STUDENT IN AN ORGANIZED HEALTH CARE EDUCATION/TRAINING PROGRAM

## 2023-12-12 RX ORDER — CITALOPRAM HYDROBROMIDE 10 MG/1
10 TABLET ORAL DAILY
Qty: 90 TABLET | Refills: 3 | Status: SHIPPED | OUTPATIENT
Start: 2023-12-12

## 2023-12-12 NOTE — PROGRESS NOTES
Assessment/Plan:     Diagnoses and all orders for this visit:    Type 2 diabetes mellitus with stage 2 chronic kidney disease, without long-term current use of insulin     Onychomycosis    Other orders  -     Ozempic, 0.25 or 0.5 MG/DOSE, 2 MG/3ML injection pen; Inject 0.25 mg under the skin every 7 days          IMPRESSION:  DM, A1c 6.1% (10/26/23)  Onychomycosis      PLAN:  I reviewed clinical exam with patient in detail today. I have discussed with the patient the pathophysiology of this diagnosis and reviewed how the examination correlates with this diagnosis. I reviewed PCP note from 10/30/23  DFE as below. Patient has significant lower extremity risk due to diminished pulses in the feet and trophic skin changes to the lower extremity including thick toenail, atrophic skin, and decreased hair growth. Debridement of toenails x10. Using nail nipper, buffy, and curette, nails were sharply debrided, reduced in thickness and length. Devitalized nail tissue and fungal debris excised and removed. Patient tolerated well. Discussed proper shoe gear, daily inspections of feet, and general foot health with patient. Patient has Q8  findings and is recommended for at risk foot care every 9-10 weeks. Subjective:      Patient ID: Jones Richter is a 70 y.o. female. Luz Marsh presents to clinic today concerning DFE and nail care. Prior podiatrist retired. F/u with PCP is regular. The following portions of the patient's history were reviewed and updated as appropriate: allergies, current medications, past family history, past medical history, past social history, past surgical history, and problem list.    Review of Systems   Constitutional:  Negative for activity change, chills and fever. HENT: Negative. Respiratory:  Negative for cough, chest tightness and shortness of breath. Cardiovascular:  Negative for chest pain and leg swelling. Endocrine: Negative. Genitourinary: Negative.     Neurological: Negative. Negative for numbness. Psychiatric/Behavioral: Negative. Negative for agitation and behavioral problems. Objective:      /78   Pulse 70   Ht 5' 7" (1.702 m)   Wt 94.3 kg (207 lb 12.8 oz)   SpO2 97%   BMI 32.55 kg/m²          Physical Exam  Cardiovascular:      Pulses: Pulses are weak. Dorsalis pedis pulses are 1+ on the right side and 1+ on the left side. Posterior tibial pulses are 1+ on the right side and 1+ on the left side. Comments: Mild varicosities with B/L LE edema. Diminished pedal hair. Feet:      Right foot:      Skin integrity: Dry skin present. No ulcer, skin breakdown, erythema, warmth or callus. Left foot:      Skin integrity: Dry skin present. No ulcer, skin breakdown, erythema, warmth or callus. Skin:     Comments: Atrophic skin - thin, dry and shiny. Mildly elongated, thickened, yellowed toenails with subungual debris x10. Neurological:      Comments: Denies N/T/B to feet. Diabetic Foot Exam    Patient's shoes and socks removed. Right Foot/Ankle   Right Foot Inspection  Skin Exam: skin normal, skin intact and dry skin. No warmth, no callus, no erythema, no maceration, no abnormal color, no pre-ulcer, no ulcer and no callus. Toe Exam: No tenderness and  no right toe deformity    Sensory   Vibration: diminished  Proprioception: diminished  Monofilament testing: intact    Vascular  Capillary refills: < 3 seconds  The right DP pulse is 1+. The right PT pulse is 1+. Left Foot/Ankle  Left Foot Inspection  Skin Exam: skin normal, skin intact and dry skin. No warmth, no erythema, no maceration, normal color, no pre-ulcer, no ulcer and no callus. Toe Exam: No tenderness and no left toe deformity. Sensory   Vibration: diminished  Proprioception: diminished  Monofilament testing: intact    Vascular  Capillary refills: < 3 seconds  The left DP pulse is 1+. The left PT pulse is 1+.      Assign Risk Category  No deformity present  No loss of protective sensation  Weak pulses  Risk: 0

## 2023-12-20 ENCOUNTER — OFFICE VISIT (OUTPATIENT)
Dept: GYNECOLOGY | Facility: CLINIC | Age: 78
End: 2023-12-20

## 2023-12-20 VITALS
DIASTOLIC BLOOD PRESSURE: 82 MMHG | HEIGHT: 67 IN | SYSTOLIC BLOOD PRESSURE: 122 MMHG | HEART RATE: 74 BPM | BODY MASS INDEX: 32.55 KG/M2

## 2023-12-20 DIAGNOSIS — Z48.89 POSTOPERATIVE VISIT: Primary | ICD-10-CM

## 2023-12-20 PROCEDURE — 99024 POSTOP FOLLOW-UP VISIT: CPT | Performed by: OBSTETRICS & GYNECOLOGY

## 2023-12-20 NOTE — PROGRESS NOTES
Patient presents for postoperative check.  She is status post hysteroscopy D&C polypectomy.  She is doing well since surgery with no complaints.    Physical exam: Uterus is anteverted normal size and contour freely mobile and nontender.  No palpable adnexal masses or tenderness.  Cervix is closed.    Path report benign endometrium and polyp    Impression: Normal postoperative check    Plan: Return to the office as needed/annual

## 2024-01-23 NOTE — PROGRESS NOTES
Assessment/Plan:    LSA discussed. Will continue estrace cream once weekly. Will start clobetasol cream in very thin layer every day for 1 week and then once weekly. Pt advised to call with flares. Risk of overuse reviewed.   Recommended monthly SBE, annual CBE and annual screening mammo. ASCCP guidelines reviewed and pap with cotesting done at pt request. Advised of possible financial responsibility. DEXA script given and colonoscopy noted to be up to date per pt. Reviewed diet/activity recommendations Calcium 1200 mg and Vit D 600-1000 IU daily.  Discussed postmenopausal considerations and symptoms to report. Kegel exercises as instructed. RTO in one year for LSA check.      Diagnoses and all orders for this visit:    Encounter for gynecological examination (general) (routine) with abnormal findings    Menopause  -     DXA bone density spine hip and pelvis; Future    Osteoporosis screening  -     DXA bone density spine hip and pelvis; Future    Lichen sclerosus  -     clobetasol (TEMOVATE) 0.05 % cream; Apply topically in the morning for 14 days Daily for 1 week and then once weekly    Atrophic vaginitis  -     estradiol (ESTRACE) 0.1 mg/g vaginal cream; 1 gram vaginally  weekly        Subjective:      Patient ID: Enedelia Davis is a 78 y.o. female.    This patient presents for routine annual gyn exam. Last seen by Dr. Bob.   Pt had a D&C/polypectomy 12/4/23 for hx of thickened endometrial lining. Pathology benign. Today, she states she has had no vaginal bleeding.   She has a hx of LSA. Using clobetasol cream as needed. States she rarely has to use it, but did start with flare on Sunday. Used clobetasol on Sunday and Monday, sx resolved.    She denies VM sx, pelvic pain, dyspareunia, breast concerns, abnormal discharge, bowel/bladder dysfunction, depression/anx.   , sexually active and is monogamous.   Pap normal 2021.   Mammography up to date and normal, 1/30/23, next scheduled.   Osteoporosis  "screening up to date, 1/27/22, normal DXA. Colonoscopy - pt states she is UTD and was told she may not need to return for repeat because they have all been normal.            The following portions of the patient's history were reviewed and updated as appropriate: allergies, current medications, past family history, past medical history, past social history, past surgical history and problem list.    Review of Systems   Constitutional: Negative.    Respiratory: Negative.     Cardiovascular: Negative.    Gastrointestinal: Negative.    Endocrine: Negative.    Genitourinary:  Negative for dyspareunia, dysuria, frequency, pelvic pain, urgency, vaginal bleeding, vaginal discharge and vaginal pain.   Musculoskeletal: Negative.    Skin: Negative.    Neurological: Negative.    Psychiatric/Behavioral: Negative.           Objective:      /72   Pulse 73   Ht 5' 7\" (1.702 m)   Wt 93.9 kg (207 lb)   BMI 32.42 kg/m²          Physical Exam  Vitals and nursing note reviewed. Exam conducted with a chaperone present.   Constitutional:       Appearance: Normal appearance. She is well-developed.   HENT:      Head: Normocephalic and atraumatic.   Neck:      Thyroid: No thyroid mass or thyromegaly.   Cardiovascular:      Rate and Rhythm: Normal rate and regular rhythm.      Heart sounds: Normal heart sounds.   Pulmonary:      Effort: Pulmonary effort is normal.      Breath sounds: Normal breath sounds.   Chest:   Breasts:     Breasts are symmetrical.      Right: No inverted nipple, mass, nipple discharge, skin change or tenderness.      Left: No inverted nipple, mass, nipple discharge, skin change or tenderness.   Abdominal:      General: Bowel sounds are normal.      Palpations: Abdomen is soft.      Tenderness: There is no abdominal tenderness.      Hernia: There is no hernia in the left inguinal area or right inguinal area.   Genitourinary:     General: Normal vulva.      Exam position: Supine.      Pubic Area: No rash.      "  Labia:         Right: No rash, tenderness, lesion or injury.         Left: No rash, tenderness, lesion or injury.       Urethra: No prolapse, urethral pain, urethral swelling or urethral lesion.      Vagina: Normal. No signs of injury and foreign body. No vaginal discharge, erythema, tenderness, bleeding, lesions or prolapsed vaginal walls.      Cervix: No cervical motion tenderness, discharge, friability, lesion, erythema, cervical bleeding or eversion.      Uterus: Not deviated, not enlarged, not fixed, not tender and no uterine prolapse.       Adnexa:         Right: No mass, tenderness or fullness.          Left: No mass, tenderness or fullness.        Rectum: No external hemorrhoid.      Comments: Urethra normal without lesions  No bladder tenderness  Stenotic os with difficult pap  Exam limited by body habitus  Agglutination of labia B/L, no hypopigmentation, erythema, ulcerations or fissures  Musculoskeletal:         General: Normal range of motion.      Cervical back: Normal range of motion and neck supple.   Lymphadenopathy:      Lower Body: No right inguinal adenopathy. No left inguinal adenopathy.   Skin:     General: Skin is warm and dry.   Neurological:      Mental Status: She is alert and oriented to person, place, and time.   Psychiatric:         Speech: Speech normal.         Behavior: Behavior normal. Behavior is cooperative.

## 2024-01-24 ENCOUNTER — ANNUAL EXAM (OUTPATIENT)
Dept: OBGYN CLINIC | Facility: CLINIC | Age: 79
End: 2024-01-24
Payer: MEDICARE

## 2024-01-24 VITALS
BODY MASS INDEX: 32.49 KG/M2 | WEIGHT: 207 LBS | DIASTOLIC BLOOD PRESSURE: 72 MMHG | HEIGHT: 67 IN | SYSTOLIC BLOOD PRESSURE: 120 MMHG | HEART RATE: 73 BPM

## 2024-01-24 DIAGNOSIS — Z13.820 OSTEOPOROSIS SCREENING: ICD-10-CM

## 2024-01-24 DIAGNOSIS — Z78.0 MENOPAUSE: ICD-10-CM

## 2024-01-24 DIAGNOSIS — N95.2 ATROPHIC VAGINITIS: ICD-10-CM

## 2024-01-24 DIAGNOSIS — Z01.411 ENCOUNTER FOR GYNECOLOGICAL EXAMINATION (GENERAL) (ROUTINE) WITH ABNORMAL FINDINGS: Primary | ICD-10-CM

## 2024-01-24 DIAGNOSIS — L90.0 LICHEN SCLEROSUS: ICD-10-CM

## 2024-01-24 PROCEDURE — G0145 SCR C/V CYTO,THINLAYER,RESCR: HCPCS | Performed by: OBSTETRICS & GYNECOLOGY

## 2024-01-24 PROCEDURE — G0101 CA SCREEN;PELVIC/BREAST EXAM: HCPCS | Performed by: OBSTETRICS & GYNECOLOGY

## 2024-01-24 PROCEDURE — G0476 HPV COMBO ASSAY CA SCREEN: HCPCS | Performed by: OBSTETRICS & GYNECOLOGY

## 2024-01-24 RX ORDER — ESTRADIOL 0.1 MG/G
CREAM VAGINAL
Qty: 42.5 G | Refills: 3 | Status: SHIPPED | OUTPATIENT
Start: 2024-01-24

## 2024-01-24 RX ORDER — CLOBETASOL PROPIONATE 0.5 MG/G
CREAM TOPICAL DAILY
Qty: 30 G | Refills: 3 | Status: SHIPPED | OUTPATIENT
Start: 2024-01-24 | End: 2024-02-07

## 2024-01-25 LAB
HPV HR 12 DNA CVX QL NAA+PROBE: NEGATIVE
HPV16 DNA CVX QL NAA+PROBE: NEGATIVE
HPV18 DNA CVX QL NAA+PROBE: NEGATIVE

## 2024-01-29 LAB
LAB AP GYN PRIMARY INTERPRETATION: NORMAL
Lab: NORMAL

## 2024-03-08 DIAGNOSIS — R73.03 PREDIABETES: ICD-10-CM

## 2024-03-08 DIAGNOSIS — R80.9 PROTEINURIA, UNSPECIFIED TYPE: ICD-10-CM

## 2024-03-08 RX ORDER — DAPAGLIFLOZIN 10 MG/1
TABLET, FILM COATED ORAL
Qty: 30 TABLET | Refills: 11 | Status: SHIPPED | OUTPATIENT
Start: 2024-03-08

## 2024-03-19 ENCOUNTER — HOSPITAL ENCOUNTER (OUTPATIENT)
Dept: RADIOLOGY | Facility: CLINIC | Age: 79
Discharge: HOME | End: 2024-03-19
Payer: MEDICARE

## 2024-03-19 VITALS — WEIGHT: 211 LBS | HEIGHT: 67 IN | BODY MASS INDEX: 33.12 KG/M2

## 2024-03-19 DIAGNOSIS — Z78.0 MENOPAUSE: ICD-10-CM

## 2024-03-19 DIAGNOSIS — Z12.31 ENCOUNTER FOR SCREENING MAMMOGRAM FOR MALIGNANT NEOPLASM OF BREAST: ICD-10-CM

## 2024-03-19 DIAGNOSIS — Z13.820 OSTEOPOROSIS SCREENING: ICD-10-CM

## 2024-03-19 PROCEDURE — 77080 DXA BONE DENSITY AXIAL: CPT

## 2024-03-19 PROCEDURE — 77067 SCR MAMMO BI INCL CAD: CPT

## 2024-03-19 PROCEDURE — 77063 BREAST TOMOSYNTHESIS BI: CPT

## 2024-03-20 ENCOUNTER — APPOINTMENT (OUTPATIENT)
Dept: LAB | Facility: HOSPITAL | Age: 79
End: 2024-03-20
Payer: MEDICARE

## 2024-03-20 DIAGNOSIS — E11.9 TYPE 2 DIABETES MELLITUS WITHOUT COMPLICATION, WITHOUT LONG-TERM CURRENT USE OF INSULIN (HCC): ICD-10-CM

## 2024-03-20 DIAGNOSIS — E78.2 MIXED HYPERLIPIDEMIA: ICD-10-CM

## 2024-03-20 LAB
CHOLEST SERPL-MCNC: 94 MG/DL
EST. AVERAGE GLUCOSE BLD GHB EST-MCNC: 134 MG/DL
HBA1C MFR BLD: 6.3 %
HDLC SERPL-MCNC: 36 MG/DL
LDLC SERPL CALC-MCNC: 35 MG/DL (ref 0–100)
NONHDLC SERPL-MCNC: 58 MG/DL
TRIGL SERPL-MCNC: 117 MG/DL

## 2024-03-20 PROCEDURE — 80061 LIPID PANEL: CPT

## 2024-03-20 PROCEDURE — 83036 HEMOGLOBIN GLYCOSYLATED A1C: CPT

## 2024-03-20 PROCEDURE — 36415 COLL VENOUS BLD VENIPUNCTURE: CPT

## 2024-03-28 ENCOUNTER — TELEPHONE (OUTPATIENT)
Dept: FAMILY MEDICINE CLINIC | Facility: CLINIC | Age: 79
End: 2024-03-28

## 2024-03-28 RX ORDER — DOXYCYCLINE HYCLATE 20 MG
20 TABLET ORAL 2 TIMES DAILY
COMMUNITY
Start: 2024-03-13

## 2024-03-28 NOTE — TELEPHONE ENCOUNTER
Good morning. My name is Enedelia and the last name is Jacklyn FRANCE am, a patient of Doctor Kaba. My date of birth is [id number]. The reason for my call is I've received a letter from my provider, Regina, indicating that the drug, and I'll spell it DAPAGLIFLOZI for 10 milligrams, is not covered on our plan or is subject to limits. I've called stop and they're telling me I should have tried Jardiance or something. I don't know, but they did say you would not. Your office got a copy of this letter as well. If you could please give me a call. I'm at [phone]. And if you're not the person to help me, please pass my question along to who I should have talked with. Thank you. Dc.

## 2024-03-29 ENCOUNTER — OFFICE VISIT (OUTPATIENT)
Dept: PODIATRY | Age: 79
End: 2024-03-29
Payer: MEDICARE

## 2024-03-29 VITALS
SYSTOLIC BLOOD PRESSURE: 132 MMHG | BODY MASS INDEX: 34.62 KG/M2 | HEART RATE: 75 BPM | TEMPERATURE: 97.9 F | WEIGHT: 215.4 LBS | OXYGEN SATURATION: 96 % | DIASTOLIC BLOOD PRESSURE: 84 MMHG | HEIGHT: 66 IN

## 2024-03-29 DIAGNOSIS — B35.1 ONYCHOMYCOSIS: ICD-10-CM

## 2024-03-29 DIAGNOSIS — N18.2 TYPE 2 DIABETES MELLITUS WITH STAGE 2 CHRONIC KIDNEY DISEASE, WITHOUT LONG-TERM CURRENT USE OF INSULIN  (HCC): Primary | ICD-10-CM

## 2024-03-29 DIAGNOSIS — E11.22 TYPE 2 DIABETES MELLITUS WITH STAGE 2 CHRONIC KIDNEY DISEASE, WITHOUT LONG-TERM CURRENT USE OF INSULIN  (HCC): Primary | ICD-10-CM

## 2024-03-29 PROCEDURE — 11721 DEBRIDE NAIL 6 OR MORE: CPT | Performed by: STUDENT IN AN ORGANIZED HEALTH CARE EDUCATION/TRAINING PROGRAM

## 2024-03-29 NOTE — PROGRESS NOTES
Enedelia Choipamela  1945  AT RISK FOOT CARE    1. Type 2 diabetes mellitus with stage 2 chronic kidney disease, without long-term current use of insulin   Diabetic Shoe Inserts    Diabetic Shoe      2. Onychomycosis            Patient presents for at-risk foot care.  Patient has no acute concerns today.  Patient has significant lower extremity risk due to diminished pulses in the feet and trophic skin changes to the lower extremity including thick toenail, atrophic skin, and decreased hair growth.      On exam patient has thickened, hypertrophic, discolored, brittle toenails with subungual debris and tenderness x10  Patient has diminished pedal pulses and decreased perfusion to the lower extremities  Patient has significant trophic changes to the skin including thick toenails, decreased pedal hair and atrophic skin.     Today's treatment includes:  Debridement of toenails. Using nail nipper, buffy, and curette, nails were sharply debrided, reduced in thickness and length. Devitalized nail tissue and fungal debris excised and removed. Patient tolerated well.    DM shoes and inserts rx'd    Discussed proper shoe gear, daily inspections of feet, and general foot health with patient. Patient has Q8  findings and is recommended for at risk foot care every 9-10 weeks.    Patients most recent complete clinical foot exam was on: 12/12/23

## 2024-04-11 ENCOUNTER — TELEPHONE (OUTPATIENT)
Age: 79
End: 2024-04-11

## 2024-04-11 ENCOUNTER — TELEPHONE (OUTPATIENT)
Dept: NEPHROLOGY | Facility: CLINIC | Age: 79
End: 2024-04-11

## 2024-04-11 DIAGNOSIS — N18.2 STAGE 2 CHRONIC KIDNEY DISEASE: Primary | ICD-10-CM

## 2024-04-11 NOTE — TELEPHONE ENCOUNTER
I called and spoke with patient, she is aware of labs needing to be done prior to her appointment.

## 2024-04-11 NOTE — TELEPHONE ENCOUNTER
Patient called- she has an appointment with Dr. Hernández on 4/22 and wants to know if she needs labs drawn?    I assume she does, however, there is none ordered.    If needed, please order them and send her a [employer] message.

## 2024-04-12 ENCOUNTER — OFFICE VISIT (OUTPATIENT)
Dept: ENDOCRINOLOGY | Facility: CLINIC | Age: 79
End: 2024-04-12
Payer: MEDICARE

## 2024-04-12 ENCOUNTER — TELEPHONE (OUTPATIENT)
Dept: NEPHROLOGY | Facility: CLINIC | Age: 79
End: 2024-04-12

## 2024-04-12 VITALS
HEART RATE: 82 BPM | WEIGHT: 211.8 LBS | DIASTOLIC BLOOD PRESSURE: 68 MMHG | BODY MASS INDEX: 34.04 KG/M2 | HEIGHT: 66 IN | SYSTOLIC BLOOD PRESSURE: 120 MMHG

## 2024-04-12 DIAGNOSIS — E78.2 MIXED HYPERLIPIDEMIA: ICD-10-CM

## 2024-04-12 DIAGNOSIS — E11.22 TYPE 2 DIABETES MELLITUS WITH STAGE 2 CHRONIC KIDNEY DISEASE, WITHOUT LONG-TERM CURRENT USE OF INSULIN  (HCC): Primary | ICD-10-CM

## 2024-04-12 DIAGNOSIS — N18.2 TYPE 2 DIABETES MELLITUS WITH STAGE 2 CHRONIC KIDNEY DISEASE, WITHOUT LONG-TERM CURRENT USE OF INSULIN  (HCC): Primary | ICD-10-CM

## 2024-04-12 DIAGNOSIS — N18.31 STAGE 3A CHRONIC KIDNEY DISEASE (HCC): ICD-10-CM

## 2024-04-12 DIAGNOSIS — I10 ESSENTIAL HYPERTENSION: ICD-10-CM

## 2024-04-12 PROCEDURE — 99204 OFFICE O/P NEW MOD 45 MIN: CPT | Performed by: STUDENT IN AN ORGANIZED HEALTH CARE EDUCATION/TRAINING PROGRAM

## 2024-04-12 RX ORDER — SEMAGLUTIDE 0.68 MG/ML
0.5 INJECTION, SOLUTION SUBCUTANEOUS
Qty: 3 ML | Refills: 2 | Status: SHIPPED | OUTPATIENT
Start: 2024-04-12 | End: 2024-07-11

## 2024-04-12 NOTE — PROGRESS NOTES
Enedelia Davis 79 y.o. female MRN: [id number]    Encounter: [id number]      Assessment/Plan     Problem List Items Addressed This Visit     Essential hypertension     Well controlled         Type 2 diabetes mellitus with stage 2 chronic kidney disease, without long-term current use of insulin  (HCC) - Primary     Enedelia is doing a very well with her diabetes management. She is at low risk for complications. We discussed strategies to employ post-prandial testing to enhance her own feedback and optimize modifiable lifestyle factors. We discussed a gentle increase in ozempic 0.5 mg weekly, and I would likely limit the extent of its use to this dosing. She may continue farxiga, although I did discuss option of using bexagliflozin for cost savings. Enedelia is on SGLT2i for history of proteinuria. I will like to see Enedelia again in 3-mo follow up to review her treatment course. If stable, may defer follow up visits to a q6-month interval.          Relevant Medications    Ozempic, 0.25 or 0.5 MG/DOSE, 2 MG/3ML injection pen    Other Relevant Orders    Hemoglobin A1C    Stage 3a chronic kidney disease (HCC)    Mixed hyperlipidemia     Very well controlled          RTC 3-mo    I have spent a total time of 43 minutes on 04/12/24 in caring for this patient including Diagnostic results, Risks and benefits of tx options, Instructions for management, Impressions, Counseling / Coordination of care, Documenting in the medical record, Reviewing / ordering tests, medicine, procedures  , and Obtaining or reviewing history  .      CC: prediabetes    History of Present Illness     HPI:    Enedelia presents today for evaluation of type 2 diabetes. Enedelia reports a diagnosis of several years duration. Her presenting A1c exceeded 7%, but she has managed improved control over the course of her treatment and has enjoyed A1c values <7%. She has no known complications. There is a family history of diabetes and early cardiovascular death. Enedelia follows  dietary and lifestyle instructions, and dutifully documents her activities (fannie chi, walking, etc) and blood sugar testing into a journal. She checks CBG daily at scattered times pre-meal or bedtime and values typically fall within good range. Her A1c is 6.3%. Her plasma glucose testing often reports values >120 - 130 on fasting. She has no symptoms.     She has prior intolerance to metformin causing GI side effects. She is on ozempic 0.25 mg weekly (tolerating) and farxiga 5 mg daily (takes 1/2 tablet). Farxiga was recommended by her nephrologist.     For hyperlipidemia she takes lipitor 20 mg daily. She is on ACE-thiazide combination for hypertension.      Review of Systems   Constitutional:  Negative for diaphoresis and unexpected weight change.   Gastrointestinal:  Negative for nausea and vomiting.   Endocrine: Negative for polydipsia and polyuria.   All other systems reviewed and are negative.      Historical Information   Past Medical History:   Diagnosis Date   • Allergic rhinitis    • Anxiety    • Celiac disease    • Cerebrovascular disease    • Colon polyp    • COVID-19     9/11/23   • Diabetes mellitus (HCC)    • Disease of thyroid gland    • Diverticulosis of colon    • Dry eye    • Gout    • Hiatal hernia    • Hypertension    • Hypothyroidism    • Kidney stone    • Nephrolithiasis    • Perennial allergic rhinitis    • Peripheral venous insufficiency    • Rosacea conjunctivitis, bilateral    • Sleep apnea    • Vitamin D deficiency      Past Surgical History:   Procedure Laterality Date   • APPENDECTOMY  ?   • CATARACT EXTRACTION, BILATERAL  01/2020   • CHOLECYSTECTOMY     • IL HYSTEROSCOPY BX ENDOMETRIUM&/POLYPC W/WO D&C N/A 12/4/2023    Procedure: DILATATION AND CURETTAGE (D&C) WITH HYSTEROSCOPY WITH POLYPECTOMY;  Surgeon: Maik Bob DO;  Location: [facility];  Service: Gynecology   • TONSILLECTOMY     • TUBAL LIGATION     • VARICOSE VEIN SURGERY       Social History   Social History      Substance and Sexual Activity   Alcohol Use No    Comment: rarely     Social History     Substance and Sexual Activity   Drug Use No     Social History     Tobacco Use   Smoking Status Never   Smokeless Tobacco Never     Family History:   Family History   Problem Relation Age of Onset   • Heart disease Mother    • Heart disease Father    • Diabetes Father    • Heart disease Sister    • Diabetes Sister    • No Known Problems Daughter    • No Known Problems Maternal Grandmother    • No Known Problems Maternal Grandfather    • No Known Problems Paternal Grandmother    • No Known Problems Paternal Grandfather    • No Known Problems Daughter    • No Known Problems Maternal Aunt    • No Known Problems Paternal Aunt    • No Known Problems Paternal Aunt    • No Known Problems Paternal Aunt    • No Known Problems Paternal Aunt    • Heart disease Brother        Meds/Allergies   Current Outpatient Medications   Medication Sig Dispense Refill   • allopurinol (ZYLOPRIM) 100 mg tablet Take 100 mg by mouth 2 (two) times a day     • aspirin (ECOTRIN LOW STRENGTH) 81 mg EC tablet Take 81 mg by mouth daily     • atorvastatin (LIPITOR) 20 mg tablet Take 1 tablet (20 mg total) by mouth daily 90 tablet 5   • Brimonidine Tartrate (Lumify) 0.025 % SOLN Apply 1 drop to eye 2 (two) times a day     • cholecalciferol (VITAMIN D3) 1,000 units tablet Take 1,000 Units by mouth 2 (two) times a day     • Cinnamon 500 MG capsule Take 500 mg by mouth daily     • citalopram (CeleXA) 10 mg tablet Take 1 tablet (10 mg total) by mouth daily 90 tablet 3   • clotrimazole-betamethasone (LOTRISONE) 1-0.05 % cream Apply topically 2 (two) times a day (Patient taking differently: Apply 1 Application topically if needed) 30 g 1   • DOCOSAHEXAENOIC ACID-EPA PO Take by mouth     • doxycycline (PERIOSTAT) 20 MG tablet Take 20 mg by mouth 2 (two) times a day     • estradiol (ESTRACE) 0.1 mg/g vaginal cream 1 gram vaginally  weekly 42.5 g 3   • Farxiga 10 MG  tablet TAKE ONE (1) TABLET BY ORAL ROUTE EVERY DAY IN THE MORNING (Patient taking differently: Take 5 mg) 30 tablet 11   • fexofenadine (ALLEGRA) 180 MG tablet Take 180 mg by mouth daily     • fluticasone (FLONASE) 50 mcg/act nasal spray 2 sprays into each nostril if needed  2   • glucose blood test strip TEST BLOOD SUGAR ONCE DIALY.     • levothyroxine 75 mcg tablet Take 75 mcg by mouth daily in the early morning     • lisinopril-hydrochlorothiazide (PRINZIDE,ZESTORETIC) 10-12.5 MG per tablet Take 1 tablet by mouth daily     • mometasone (ELOCON) 0.1 % cream Apply 1 Application topically once a week     • Ozempic, 0.25 or 0.5 MG/DOSE, 2 MG/3ML injection pen Inject 0.75 mL (0.5 mg total) under the skin every 7 days 3 mL 2   • Probiotic Product (PROBIOTIC DAILY PO) Take 1 capsule by mouth in the morning     • SUMAtriptan (IMITREX) 50 mg tablet Take 50 mg by mouth once as needed     • Turmeric Curcumin 500 MG CAPS Take 1,000 mg by mouth in the morning     • clobetasol (TEMOVATE) 0.05 % cream Apply topically in the morning for 14 days Daily for 1 week and then once weekly 30 g 3   • cycloSPORINE (RESTASIS) 0.05 % ophthalmic emulsion Administer 1 drop to both eyes 2 (two) times a day (Patient not taking: Reported on 4/12/2024)     • lidocaine (XYLOCAINE) 1 %  (Patient not taking: Reported on 4/12/2024)     • meloxicam (MOBIC) 7.5 mg tablet Take 7.5 mg by mouth daily (Patient not taking: Reported on 11/28/2023)     • methylPREDNISolone acetate (DEPO-MEDROL) 40 mg/mL injection Co-injected with local anesthetic (Patient not taking: Reported on 11/28/2023)     • Multiple Vitamins-Minerals (PreserVision AREDS) CAPS Take 1 capsule by mouth 2 (two) times a day (Patient not taking: Reported on 3/29/2024)     • Omega-3 Fatty Acids (FISH OIL) 645 MG CAPS Take by mouth (Patient not taking: Reported on 3/29/2024)     • White Petrolatum-Mineral Oil (REFRESH LACRI-LUBE OP) Apply 1 drop to eye 2 (two) times a day Systane complete  "(Patient not taking: Reported on 3/29/2024)       No current facility-administered medications for this visit.     Allergies   Allergen Reactions   • Gluten Meal - Food Allergy Other (See Comments)     Celiac disease   • Penicillin G Hives   • Wheat Bran - Food Allergy GI Intolerance     Has celiac disease   • Diclofenac Rash     Voltaren gel   • Diclofenac Sodium Rash   • Penicillins Rash and Hives       Objective   Vitals: Blood pressure 120/68, pulse 82, height 5' 6\" (1.676 m), weight 96.1 kg (211 lb 12.8 oz).    Physical Exam  Vitals reviewed.   Constitutional:       General: She is not in acute distress.     Appearance: Normal appearance.   HENT:      Head: Normocephalic and atraumatic.      Nose: Nose normal.   Eyes:      General: No scleral icterus.     Conjunctiva/sclera: Conjunctivae normal.   Pulmonary:      Effort: Pulmonary effort is normal. No respiratory distress.   Musculoskeletal:         General: No deformity.      Cervical back: Normal range of motion.   Skin:     General: Skin is warm and dry.   Neurological:      General: No focal deficit present.      Mental Status: She is alert.   Psychiatric:         Mood and Affect: Mood normal.         Behavior: Behavior normal.         The history was obtained from the review of the chart, patient.    Lab Results:   Lab Results   Component Value Date/Time    Hemoglobin A1C 6.3 (H) 03/20/2024 07:53 AM    Hemoglobin A1C 6.1 (H) 11/20/2023 08:33 AM    Hemoglobin A1C 6.1 (H) 10/26/2023 07:31 AM    WBC 6.20 11/29/2023 08:29 AM    WBC 5.50 08/04/2023 07:41 AM    Hemoglobin 14.9 11/29/2023 08:29 AM    Hemoglobin 14.4 08/04/2023 07:41 AM    Hematocrit 47.3 (H) 11/29/2023 08:29 AM    Hematocrit 44.7 08/04/2023 07:41 AM    MCV 98 11/29/2023 08:29 AM    MCV 99 (H) 08/04/2023 07:41 AM    Platelets 235 11/29/2023 08:29 AM    Platelets 238 08/04/2023 07:41 AM    BUN 13 10/24/2023 08:01 AM    BUN 19 08/04/2023 07:41 AM    BUN 18 07/13/2023 08:16 AM    Potassium 4.1 " "10/24/2023 08:01 AM    Potassium 3.9 08/04/2023 07:41 AM    Potassium 4.4 07/13/2023 08:16 AM    Chloride 101 10/24/2023 08:01 AM    Chloride 107 08/04/2023 07:41 AM    Chloride 104 07/13/2023 08:16 AM    CO2 31 10/24/2023 08:01 AM    CO2 28 08/04/2023 07:41 AM    CO2 27 07/13/2023 08:16 AM    Creatinine 0.79 10/24/2023 08:01 AM    Creatinine 0.95 08/04/2023 07:41 AM    Creatinine 0.98 07/13/2023 08:16 AM    AST 20 10/24/2023 08:01 AM    AST 25 08/04/2023 07:41 AM    AST 31 05/01/2023 08:36 AM    ALT 19 10/24/2023 08:01 AM    ALT 35 08/04/2023 07:41 AM    ALT 34 05/01/2023 08:36 AM    Total Protein 6.7 10/24/2023 08:01 AM    Total Protein 6.9 10/24/2023 08:01 AM    Total Protein 7.5 08/04/2023 07:41 AM    Total Protein 7.4 05/01/2023 08:36 AM    Albumin 4.2 10/24/2023 08:01 AM    Albumin 3.6 08/04/2023 07:41 AM    Albumin 3.8 05/01/2023 08:36 AM    HDL, Direct 36 (L) 03/20/2024 07:53 AM    HDL, Direct 39 (L) 11/20/2023 08:33 AM    HDL, Direct 36 (L) 10/26/2023 07:31 AM    Triglycerides 117 03/20/2024 07:53 AM    Triglycerides 86 11/20/2023 08:33 AM    Triglycerides 84 10/26/2023 07:31 AM           Imaging Studies: I have personally reviewed pertinent reports.      Portions of the record may have been created with voice recognition software. Occasional wrong word or \"sound a like\" substitutions may have occurred due to the inherent limitations of voice recognition software. Read the chart carefully and recognize, using context, where substitutions have occurred.    "

## 2024-04-12 NOTE — ASSESSMENT & PLAN NOTE
Enedelia is doing a very well with her diabetes management. She is at low risk for complications. We discussed strategies to employ post-prandial testing to enhance her own feedback and optimize modifiable lifestyle factors. We discussed a gentle increase in ozempic 0.5 mg weekly, and I would likely limit the extent of its use to this dosing. She may continue farxiga, although I did discuss option of using bexagliflozin for cost savings. Enedelia is on SGLT2i for history of proteinuria. I will like to see Enedelia again in 3-mo follow up to review her treatment course. If stable, may defer follow up visits to a q6-month interval.    unable to assess

## 2024-04-12 NOTE — PATIENT INSTRUCTIONS
[email or web address] is an online pharmacy that carries a drug called Brenzavvy (bexagliflozin), which is in the class of drugs as farxiga and is much more affordable. If you sign up for an account, I can send a script through that pharmacy to establish mail order    [email or web address]    Check sugars daily    Best times to monitor are on waking, before meals or bedtime  Ok to alternate different times of day    Goal Blood Sugars:   Premeal , even better <110  2hr after a meal <180, even better <140  A1C <7%, even better <6.5%.    Aim for 45g carbohydrates with meals  15-20g with snacks    Goal exercise is 30 minutes daily, most days of the week    Please have labs done before next visit    Bring your meter or logbook with you each visit    Return appointment 3 months

## 2024-04-15 ENCOUNTER — TELEPHONE (OUTPATIENT)
Age: 79
End: 2024-04-15

## 2024-04-15 NOTE — TELEPHONE ENCOUNTER
Patient calling to see if labs were ordered for upcoming appt. Made aware CMP is ordered. Patient verbalized understanding.

## 2024-04-17 ENCOUNTER — APPOINTMENT (OUTPATIENT)
Dept: LAB | Facility: MEDICAL CENTER | Age: 79
End: 2024-04-17
Payer: MEDICARE

## 2024-04-17 DIAGNOSIS — N18.2 STAGE 2 CHRONIC KIDNEY DISEASE: ICD-10-CM

## 2024-04-17 LAB
ALBUMIN SERPL BCP-MCNC: 4.2 G/DL (ref 3.5–5)
ALP SERPL-CCNC: 59 U/L (ref 34–104)
ALT SERPL W P-5'-P-CCNC: 19 U/L (ref 7–52)
ANION GAP SERPL CALCULATED.3IONS-SCNC: 9 MMOL/L (ref 4–13)
AST SERPL W P-5'-P-CCNC: 18 U/L (ref 13–39)
BILIRUB SERPL-MCNC: 0.6 MG/DL (ref 0.2–1)
BUN SERPL-MCNC: 17 MG/DL (ref 5–25)
CALCIUM SERPL-MCNC: 9.2 MG/DL (ref 8.4–10.2)
CHLORIDE SERPL-SCNC: 102 MMOL/L (ref 96–108)
CO2 SERPL-SCNC: 29 MMOL/L (ref 21–32)
CREAT SERPL-MCNC: 0.75 MG/DL (ref 0.6–1.3)
GFR SERPL CREATININE-BSD FRML MDRD: 77 ML/MIN/1.73SQ M
GLUCOSE P FAST SERPL-MCNC: 122 MG/DL (ref 65–99)
POTASSIUM SERPL-SCNC: 3.8 MMOL/L (ref 3.5–5.3)
PROT SERPL-MCNC: 7.2 G/DL (ref 6.4–8.4)
SODIUM SERPL-SCNC: 140 MMOL/L (ref 135–147)

## 2024-04-17 PROCEDURE — 80053 COMPREHEN METABOLIC PANEL: CPT

## 2024-04-17 PROCEDURE — 36415 COLL VENOUS BLD VENIPUNCTURE: CPT

## 2024-04-19 ENCOUNTER — TELEPHONE (OUTPATIENT)
Age: 79
End: 2024-04-19

## 2024-04-19 DIAGNOSIS — E11.22 TYPE 2 DIABETES MELLITUS WITH STAGE 2 CHRONIC KIDNEY DISEASE, WITHOUT LONG-TERM CURRENT USE OF INSULIN  (HCC): Primary | ICD-10-CM

## 2024-04-19 DIAGNOSIS — N18.2 TYPE 2 DIABETES MELLITUS WITH STAGE 2 CHRONIC KIDNEY DISEASE, WITHOUT LONG-TERM CURRENT USE OF INSULIN  (HCC): Primary | ICD-10-CM

## 2024-04-19 RX ORDER — BEXAGLIFLOZIN 20 MG
20 TABLET ORAL DAILY
Qty: 90 TABLET | Refills: 3 | Status: SHIPPED | OUTPATIENT
Start: 2024-04-19

## 2024-04-19 NOTE — TELEPHONE ENCOUNTER
Enedelia [facility],     Please order the following new medication    [facility] [place]    PHARMACY: [facility]    Patient is asking for a phone call when the medication is sent to the new pharmacy.    Thank you    CB: [phone]

## 2024-04-19 NOTE — TELEPHONE ENCOUNTER
Patient called in said that she is trying to sign up for the [facility] and having difficulties signing up.  Assisted patient in trying to find their help desk number, there is no phone number just a online help desk chat, she will do that to try to resolve her issue.  She may call us back to have a prescription sent to that pharmacy once she signs up.

## 2024-04-22 ENCOUNTER — OFFICE VISIT (OUTPATIENT)
Dept: NEPHROLOGY | Facility: CLINIC | Age: 79
End: 2024-04-22
Payer: MEDICARE

## 2024-04-22 VITALS
HEIGHT: 66 IN | OXYGEN SATURATION: 96 % | SYSTOLIC BLOOD PRESSURE: 124 MMHG | HEART RATE: 70 BPM | WEIGHT: 211 LBS | BODY MASS INDEX: 33.91 KG/M2 | DIASTOLIC BLOOD PRESSURE: 68 MMHG

## 2024-04-22 DIAGNOSIS — E11.22 TYPE 2 DIABETES MELLITUS WITH STAGE 2 CHRONIC KIDNEY DISEASE, WITHOUT LONG-TERM CURRENT USE OF INSULIN  (HCC): ICD-10-CM

## 2024-04-22 DIAGNOSIS — N28.1 CYST OF RIGHT KIDNEY: ICD-10-CM

## 2024-04-22 DIAGNOSIS — R80.9 PROTEINURIA, UNSPECIFIED TYPE: ICD-10-CM

## 2024-04-22 DIAGNOSIS — N18.2 STAGE 2 CHRONIC KIDNEY DISEASE: Primary | ICD-10-CM

## 2024-04-22 DIAGNOSIS — N18.2 TYPE 2 DIABETES MELLITUS WITH STAGE 2 CHRONIC KIDNEY DISEASE, WITHOUT LONG-TERM CURRENT USE OF INSULIN  (HCC): ICD-10-CM

## 2024-04-22 DIAGNOSIS — I10 ESSENTIAL HYPERTENSION: ICD-10-CM

## 2024-04-22 DIAGNOSIS — E66.01 MORBID (SEVERE) OBESITY DUE TO EXCESS CALORIES (HCC): ICD-10-CM

## 2024-04-22 PROCEDURE — G2211 COMPLEX E/M VISIT ADD ON: HCPCS | Performed by: INTERNAL MEDICINE

## 2024-04-22 PROCEDURE — 99214 OFFICE O/P EST MOD 30 MIN: CPT | Performed by: INTERNAL MEDICINE

## 2024-04-22 NOTE — PROGRESS NOTES
Assessment & Plan:    1. Stage 2 chronic kidney disease  -     Urinalysis with microscopic; Future; Expected date: 04/01/2025  -     Uric acid; Future; Expected date: 04/01/2025  -     PTH, intact; Future; Expected date: 04/01/2025  -     Comprehensive metabolic panel; Future; Expected date: 04/01/2025  -     Albumin / creatinine urine ratio; Future; Expected date: 04/01/2025  2. Morbid (severe) obesity due to excess calories (HCC)  3. Cyst of right kidney  4. Type 2 diabetes mellitus with stage 2 chronic kidney disease, without long-term current use of insulin  ([facility])  5. Essential hypertension  6. Proteinuria, unspecified type       1.CKD2 2/2 age related eGFR loss, DKD, HTN.  A2 albuminuria  Cr baseline 0.7-0.9, most recent Cr 0.75 on 4/17 with eGFR 77 ml/min.  Volume status appears compensated.  Given very mild nature of CKD and improvement in albuminuria, follow up in 1 year.  No change to therapy at this time.  Encourage hydration to 2 quart per day and maintain low sodium diet.  Goal TCO2>22,goal BP<130/80.    2. Proteinuria  Most recent quantification normalized. Follow A2 albuminuria.  Encourage weight loss and Ozempic use.  Continue RAASi.  Working with cost savings program for Brenzavvy.    3. Morbid obesity  Encourage weight loss, continue therapy with Ozempic.    4. Right kidney cyst  Plan for repeat renal US prior to urology appt in May.    5.type 2 DM   Continue glycemic control per PCP.A1C well controlled at 6.3. Patient on increasing doses of Ozempic.    6. Essential HTN   BP well controlled at present, no indication to change current regimen.continue lisinopril-HCTZ for CKD progression.  Goal BP<130/80.    Patient will require ongoing complex care for CKD management.    The benefits, risks and alternatives to the treatment plan were discussed at this visit. Patient was advised of common adverse effects of any medical therapies prescribed. All questions were answered and discussed with the patient  "and any accompanying family members or caretakers.      Subjective:      Patient ID: Enedelia Davis is a 79 y.o. female presents for follow up in the [place] office for CKD management. Patient last seen on 10/30/23 for CKD management.    HPI  In the interim since last visit, denies any new medical conditions or surgeries.    She was prescribed Bexagliflozin and she is working with cost program.     She is on Ozempic , increased dose to 0.5. Weight maintaining at 209 lb.    Prescribed doxycyline for rosacea.     She is on lisinopril-HCTZ and BP well controlled.     Denies any stone attacks and follows with urology.    Goes to [employer] three times a week and walks 2 mile/day.    ROS negative.    The following portions of the patient's history were reviewed and updated as appropriate: allergies, current medications, past family history, past medical history, past social history, past surgical history, and problem list.    Review of Systems   Constitutional: Negative.    Respiratory: Negative.     Cardiovascular: Negative.    Gastrointestinal: Negative.    Genitourinary: Negative.    All other systems reviewed and are negative.        Objective:      /68 (BP Location: Left arm, Patient Position: Sitting, Cuff Size: Large)   Pulse 70   Ht 5' 6\" (1.676 m)   Wt 95.7 kg (211 lb)   SpO2 96%   BMI 34.06 kg/m²          Physical Exam  Vitals reviewed.   Constitutional:       General: She is not in acute distress.     Appearance: She is obese. She is not ill-appearing.   HENT:      Head: Normocephalic and atraumatic.      Nose: Nose normal.      Mouth/Throat:      Mouth: Mucous membranes are moist.      Pharynx: Oropharynx is clear.   Cardiovascular:      Rate and Rhythm: Normal rate and regular rhythm.      Heart sounds:      No friction rub.   Pulmonary:      Breath sounds: No wheezing, rhonchi or rales.   Abdominal:      Palpations: Abdomen is soft.      Tenderness: There is no abdominal tenderness. There is " "no guarding.   Musculoskeletal:      Right lower leg: No edema.      Left lower leg: No edema.   Skin:     Findings: No bruising, lesion or rash.   Neurological:      General: No focal deficit present.      Mental Status: She is alert and oriented to person, place, and time. Mental status is at baseline.      Cranial Nerves: No cranial nerve deficit.   Psychiatric:         Mood and Affect: Mood normal.         Behavior: Behavior normal.             Lab Results   Component Value Date    SODIUM 140 04/17/2024    K 3.8 04/17/2024     04/17/2024    CO2 29 04/17/2024    AGAP 9 04/17/2024    BUN 17 04/17/2024    CREATININE 0.75 04/17/2024    GLUF 122 (H) 04/17/2024    CALCIUM 9.2 04/17/2024    AST 18 04/17/2024    ALT 19 04/17/2024    ALKPHOS 59 04/17/2024    TP 7.2 04/17/2024    TBILI 0.60 04/17/2024    EGFR 77 04/17/2024      Lab Results   Component Value Date    CREATININE 0.75 04/17/2024    CREATININE 0.79 10/24/2023    CREATININE 0.95 08/04/2023    CREATININE 0.98 07/13/2023    CREATININE 0.97 05/01/2023    CREATININE 0.88 04/10/2023    CREATININE 0.76 01/09/2023    CREATININE 0.91 07/29/2022    CREATININE 0.85 01/28/2022    CREATININE 0.82 08/27/2021    CREATININE 0.72 07/30/2021    CREATININE 0.77 01/20/2021    CREATININE 0.83 07/31/2020    CREATININE 0.85 10/29/2019    CREATININE 0.89 06/11/2019      Lab Results   Component Value Date    COLORU Colorless 10/24/2023    CLARITYU Clear 10/24/2023    SPECGRAV 1.003 10/24/2023    PHUR 6.5 10/24/2023    LEUKOCYTESUR Negative 10/24/2023    NITRITE Negative 10/24/2023    PROTEIN UA Negative 10/24/2023    GLUCOSEU 1000 (1%) (A) 10/24/2023    KETONESU Negative 10/24/2023    UROBILINOGEN <2.0 10/24/2023    BILIRUBINUR Negative 10/24/2023    BLOODU Negative 10/24/2023    RBCUA None Seen 10/24/2023    WBCUA 1-2 10/24/2023    EPIS Occasional 10/24/2023    BACTERIA Occasional 10/24/2023      No results found for: \"LABPROT\"  No results found for: \"MICROALBUR\", " "\"[id number]\"  Lab Results   Component Value Date    WBC 6.20 11/29/2023    HGB 14.9 11/29/2023    HCT 47.3 (H) 11/29/2023    MCV 98 11/29/2023     11/29/2023      Lab Results   Component Value Date    HGB 14.9 11/29/2023    HGB 14.4 08/04/2023    HGB 14.2 01/09/2023    HGB 14.5 07/29/2022    HGB 14.7 01/28/2022      No results found for: \"IRON\", \"TIBC\", \"FERRITIN\"   No results found for: \"PTHCALCIUM\", \"URXA59HDEDYT\", \"PHOSPHORUS\"   Lab Results   Component Value Date    CHOLESTEROL 94 03/20/2024    HDL 36 (L) 03/20/2024    LDLCALC 35 03/20/2024    TRIG 117 03/20/2024      Lab Results   Component Value Date    URICACID 4.1 08/04/2023      Lab Results   Component Value Date    HGBA1C 6.3 (H) 03/20/2024      Lab Results   Component Value Date    FREET4 0.91 01/09/2023      No results found for: \"SANDOR\", \"DSDNAAB\", \"RFIGM\"   No results found for: \"PROT\", \"UPEP\", \"IMMUNOFIX\", \"KAPPALAMBDA\", \"KAPPALIGHT\"     Portions of the record may have been created with voice recognition software. Occasional wrong word or \"sound a like\" substitutions may have occurred due to the inherent limitations of voice recognition software. Read the chart carefully and recognize, using context, where substitutions have occurred. If you have any questions, please contact the dictating provider.      "

## 2024-04-22 NOTE — PATIENT INSTRUCTIONS
AS a reminder, repeat kidney ultrasound prior to Urology appt in May. No need for duplicate US in July.   Your kidney function is very stable and for your age very good. You are at 77%.

## 2024-04-25 ENCOUNTER — TELEPHONE (OUTPATIENT)
Dept: FAMILY MEDICINE CLINIC | Facility: CLINIC | Age: 79
End: 2024-04-25

## 2024-04-25 ENCOUNTER — TELEPHONE (OUTPATIENT)
Age: 79
End: 2024-04-25

## 2024-04-25 NOTE — TELEPHONE ENCOUNTER
Left message for patient to return call. Received a prior auth request on Farxiga for patient but does not look like she is currently on this medication and need to confirm.

## 2024-04-25 NOTE — TELEPHONE ENCOUNTER
Patient returned call regarding Farxiga question.  Patient said she is on Farxiga but stated she has enough until she sees Dr. Kaba next week and to hold off on the prior auth until then.  Thank you.

## 2024-04-30 ENCOUNTER — OFFICE VISIT (OUTPATIENT)
Dept: FAMILY MEDICINE CLINIC | Facility: CLINIC | Age: 79
End: 2024-04-30
Payer: MEDICARE

## 2024-04-30 VITALS
WEIGHT: 212 LBS | HEART RATE: 85 BPM | BODY MASS INDEX: 34.07 KG/M2 | HEIGHT: 66 IN | DIASTOLIC BLOOD PRESSURE: 70 MMHG | SYSTOLIC BLOOD PRESSURE: 156 MMHG | TEMPERATURE: 95.1 F | OXYGEN SATURATION: 98 %

## 2024-04-30 DIAGNOSIS — E78.2 MIXED HYPERLIPIDEMIA: ICD-10-CM

## 2024-04-30 DIAGNOSIS — E11.22 TYPE 2 DIABETES MELLITUS WITH STAGE 2 CHRONIC KIDNEY DISEASE, WITHOUT LONG-TERM CURRENT USE OF INSULIN  (HCC): ICD-10-CM

## 2024-04-30 DIAGNOSIS — I25.10 CORONARY ARTERY CALCIFICATION SEEN ON CT SCAN: ICD-10-CM

## 2024-04-30 DIAGNOSIS — L90.0 LICHEN SCLEROSUS: ICD-10-CM

## 2024-04-30 DIAGNOSIS — R19.5 CHANGE IN CONSISTENCY OF STOOL: ICD-10-CM

## 2024-04-30 DIAGNOSIS — N18.2 TYPE 2 DIABETES MELLITUS WITH STAGE 2 CHRONIC KIDNEY DISEASE, WITHOUT LONG-TERM CURRENT USE OF INSULIN  (HCC): ICD-10-CM

## 2024-04-30 DIAGNOSIS — Z00.00 MEDICARE ANNUAL WELLNESS VISIT, SUBSEQUENT: Primary | ICD-10-CM

## 2024-04-30 DIAGNOSIS — E03.9 ACQUIRED HYPOTHYROIDISM: ICD-10-CM

## 2024-04-30 DIAGNOSIS — I10 ESSENTIAL HYPERTENSION: ICD-10-CM

## 2024-04-30 DIAGNOSIS — K90.0 CELIAC DISEASE: ICD-10-CM

## 2024-04-30 DIAGNOSIS — E11.9 TYPE 2 DIABETES MELLITUS WITHOUT COMPLICATION, WITHOUT LONG-TERM CURRENT USE OF INSULIN (HCC): ICD-10-CM

## 2024-04-30 PROBLEM — Z86.39 HISTORY OF HYPOTHYROIDISM: Status: ACTIVE | Noted: 2023-11-06

## 2024-04-30 PROBLEM — Z86.39 HISTORY OF HYPOTHYROIDISM: Status: RESOLVED | Noted: 2023-11-06 | Resolved: 2024-04-30

## 2024-04-30 PROBLEM — Z87.39 HISTORY OF GOUT: Status: ACTIVE | Noted: 2023-11-06

## 2024-04-30 PROBLEM — Z87.39 HISTORY OF GOUT: Status: RESOLVED | Noted: 2023-11-06 | Resolved: 2024-04-30

## 2024-04-30 PROBLEM — N18.31 STAGE 3A CHRONIC KIDNEY DISEASE (HCC): Status: RESOLVED | Noted: 2023-07-17 | Resolved: 2024-04-30

## 2024-04-30 PROCEDURE — G0439 PPPS, SUBSEQ VISIT: HCPCS | Performed by: FAMILY MEDICINE

## 2024-04-30 NOTE — ASSESSMENT & PLAN NOTE
Questionably due to medication adjustment.  I am not overly concerned since otherwise without any symptomatology.  I advise she monitor with new medication changes if symptoms persist or worsen then to call or to GI

## 2024-04-30 NOTE — ASSESSMENT & PLAN NOTE
Very stable as per recent follow-up with nephro.  She has follow-up again in 1 year and she knows very well to stay very hydrated and to avoid NSAIDs  Lab Results   Component Value Date    HGBA1C 6.3 (H) 03/20/2024

## 2024-04-30 NOTE — ASSESSMENT & PLAN NOTE
Discussed the need for updated Prevnar however patient left the office today before receiving therefore we will notify her to return.  Also discussed RSV; otherwise up-to-date with all immunizations and testing   fortunately I will see her no more and she will follow-up with new family care doctor Dr. Quintanilla as well as specialists  as noted.   I did wish her well and hopefully will see her on some bus trips to get

## 2024-04-30 NOTE — PROGRESS NOTES
Assessment and Plan:     Problem List Items Addressed This Visit     Celiac disease    Essential hypertension     Well-controlled therefore to continue same medications and diet.  Also continue home monitoring and she have his parameters to call if elevated.         Hypothyroidism     Well-controlled         Type 2 diabetes mellitus (HCC)       For follow-up with endo   she is up-to-date with eye exams as well as foot exams.           Type 2 diabetes mellitus with stage 2 chronic kidney disease, without long-term current use of insulin  (HCC)     Very stable as per recent follow-up with nephro.  She has follow-up again in 1 year and she knows very well to stay very hydrated and to avoid NSAIDs  Lab Results   Component Value Date    HGBA1C 6.3 (H) 03/20/2024          Coronary artery calcification seen on CT scan     Without sx's & with good control of chol;BP; sugar; etc         Mixed hyperlipidemia     Well-controlled therefore to continue same medications and diet.         Lichen sclerosus     Encourage routine follow-up with GYN.         Medicare annual wellness visit, subsequent - Primary     Discussed the need for updated Prevnar however patient left the office today before receiving therefore we will notify her to return.  Also discussed RSV; otherwise up-to-date with all immunizations and testing   fortunately I will see her no more and she will follow-up with new family care doctor Dr. Quintanilla as well as specialists  as noted.   I did wish her well and hopefully will see her on some bus trips to get         Change in consistency of stool     Questionably due to medication adjustment.  I am not overly concerned since otherwise without any symptomatology.  I advise she monitor with new medication changes if symptoms persist or worsen then to call or to GI              Depression Screening and Follow-up Plan: Patient was screened for depression during today's encounter. They screened negative with a PHQ-2 score  of 0.      Preventive health issues were discussed with patient, and age appropriate screening tests were ordered as noted in patient's After Visit Summary.  Personalized health advice and appropriate referrals for health education or preventive services given if needed, as noted in patient's After Visit Summary.     History of Present Illness:     Patient presents for a Medicare Wellness Visit    Patient has scheduled follow-up appointment with Dr. Quintanilla in the [employer] since she had I was retiring.  She saw Dr. Quintanillayears ago.  She also was recently evaluated by Dr. Alejo since she was concerned about elevated hemoglobin A1c despite very good home monitoring and really watching her diet and exercising.  He elected to increase her Ozempic to 0.5 mg weekly and changed her Farxiga to a new generic medication which is markedly cheaper.  So far she feels great with these dosage changes and states sugar readings good.  She does have follow-up with him in 2 to 3 months.    Her only concern today is a change of bowel movements stating she now goes every 2 days instead of every day with very very large stool however denying constipation, hematochezia, melena, or mucus.  She also denies any abdominal pain or cramping.  She does not think she has changed her diet and she keeps well-hydrated but not sure if symptoms occurred with the change of medications as noted above.  She had a colonoscopy several years ago without any abnormalities and was told she needed no further due to her age.  She denies any known positive family history.    She also was seen by GYN.  She was told needed no further Pap smears but still pelvic exam since she does have a diagnosis of lichen.  Does complain of occasional vaginal irritation relieved with treatment given.  She denies any vaginal discharge or bleeding. ( She sees associate of Dr. Thompson)       Patient Care Team:  Rosanne Kaba MD as PCP - General  Wolfgang ARGUELLES  MD Tank (Urology)  Viola Gupta PA-C (Vascular Surgery)  Juancho Alejo DO (Endocrinology)  Elizabeth Hernández DO (Nephrology)     Review of Systems:     Review of Systems     Problem List:     Patient Active Problem List   Diagnosis   • Nephrolithiasis   • DEEPAK on CPAP   • 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   • Varicose veins of both lower extremities with complications   • Renal cyst   • Proteinuria   • Morbid (severe) obesity due to excess calories (HCC)   • Celiac disease   • Cerebrovascular disease   • Diverticulosis of colon   • Essential hypertension   • Gout   • Hiatal hernia   • Hypothyroidism   • Polyp of colon   • Type 2 diabetes mellitus (HCC)   • Vitamin D deficiency   • Type 2 diabetes mellitus with stage 2 chronic kidney disease, without long-term current use of insulin  (HCC)   • Coronary artery calcification seen on CT scan   • Mixed hyperlipidemia   • Chondromalacia patellae   • Lichen sclerosus   • Osteopenia   • Perennial allergic rhinitis   • Peripheral venous insufficiency   • Endometrial stripe increased   • Endometrial polyp   • Medicare annual wellness visit, subsequent   • Change in consistency of stool      Past Medical and Surgical History:     Past Medical History:   Diagnosis Date   • Allergic rhinitis    • Anxiety    • Celiac disease    • Cerebrovascular disease    • Colon polyp    • COVID-19     9/11/23   • Diabetes mellitus (HCC)    • Disease of thyroid gland    • Diverticulosis of colon    • Dry eye    • Gout    • Hiatal hernia    • Hypertension    • Hypothyroidism    • Kidney stone    • Nephrolithiasis    • Perennial allergic rhinitis    • Peripheral venous insufficiency    • Rosacea conjunctivitis, bilateral    • Sleep apnea    • Vitamin D deficiency      Past Surgical History:   Procedure Laterality Date   • APPENDECTOMY  ?   • CATARACT EXTRACTION, BILATERAL  01/2020   • CHOLECYSTECTOMY     • DE HYSTEROSCOPY BX ENDOMETRIUM&/POLYPC W/WO D&C  N/A 12/4/2023    Procedure: DILATATION AND CURETTAGE (D&C) WITH HYSTEROSCOPY WITH POLYPECTOMY;  Surgeon: Maik Bob DO;  Location: [facility];  Service: Gynecology   • TONSILLECTOMY     • TUBAL LIGATION     • VARICOSE VEIN SURGERY        Family History:     Family History   Problem Relation Age of Onset   • Heart disease Mother    • Heart disease Father    • Diabetes Father    • Heart disease Sister    • Diabetes Sister    • No Known Problems Daughter    • No Known Problems Maternal Grandmother    • No Known Problems Maternal Grandfather    • No Known Problems Paternal Grandmother    • No Known Problems Paternal Grandfather    • No Known Problems Daughter    • No Known Problems Maternal Aunt    • No Known Problems Paternal Aunt    • No Known Problems Paternal Aunt    • No Known Problems Paternal Aunt    • No Known Problems Paternal Aunt    • Heart disease Brother       Social History:     Social History     Socioeconomic History   • Marital status: /Civil Union     Spouse name: None   • Number of children: None   • Years of education: None   • Highest education level: None   Occupational History   • None   Tobacco Use   • Smoking status: Never   • Smokeless tobacco: Never   Vaping Use   • Vaping status: Never Used   Substance and Sexual Activity   • Alcohol use: No     Comment: rarely   • Drug use: No   • Sexual activity: Yes     Partners: Male     Birth control/protection: Post-menopausal   Other Topics Concern   • None   Social History Narrative   • None     Social Determinants of Health     Financial Resource Strain: Not on file   Food Insecurity: No Food Insecurity (4/25/2024)    Hunger Vital Sign    • Worried About Running Out of Food in the Last Year: Never true    • Ran Out of Food in the Last Year: Never true   Transportation Needs: No Transportation Needs (4/25/2024)    PRAPARE - Transportation    • Lack of Transportation (Medical): No    • Lack of Transportation (Non-Medical): No   Physical  Activity: Not on file   Stress: Not on file   Social Connections: Not on file   Intimate Partner Violence: Not on file   Housing Stability: Low Risk  (4/25/2024)    Housing Stability Vital Sign    • Unable to Pay for Housing in the Last Year: No    • Number of Places Lived in the Last Year: 1    • Unstable Housing in the Last Year: No      Medications and Allergies:     Current Outpatient Medications   Medication Sig Dispense Refill   • allopurinol (ZYLOPRIM) 100 mg tablet Take 100 mg by mouth 2 (two) times a day     • aspirin (ECOTRIN LOW STRENGTH) 81 mg EC tablet Take 81 mg by mouth daily     • atorvastatin (LIPITOR) 20 mg tablet Take 1 tablet (20 mg total) by mouth daily 90 tablet 5   • Bexagliflozin 20 MG TABS Take 20 mg by mouth in the morning 90 tablet 3   • Brimonidine Tartrate (Lumify) 0.025 % SOLN Apply 1 drop to eye 2 (two) times a day     • cholecalciferol (VITAMIN D3) 1,000 units tablet Take 1,000 Units by mouth 2 (two) times a day     • citalopram (CeleXA) 10 mg tablet Take 1 tablet (10 mg total) by mouth daily 90 tablet 3   • clobetasol (TEMOVATE) 0.05 % cream Apply topically in the morning for 14 days Daily for 1 week and then once weekly 30 g 3   • cycloSPORINE (RESTASIS) 0.05 % ophthalmic emulsion Administer 1 drop to both eyes 2 (two) times a day     • doxycycline (PERIOSTAT) 20 MG tablet Take 20 mg by mouth 2 (two) times a day     • estradiol (ESTRACE) 0.1 mg/g vaginal cream 1 gram vaginally  weekly 42.5 g 3   • fexofenadine (ALLEGRA) 180 MG tablet Take 180 mg by mouth daily     • fluticasone (FLONASE) 50 mcg/act nasal spray 2 sprays into each nostril if needed  2   • glucose blood test strip TEST BLOOD SUGAR ONCE DIALY.     • levothyroxine 75 mcg tablet Take 75 mcg by mouth daily in the early morning     • lisinopril-hydrochlorothiazide (PRINZIDE,ZESTORETIC) 10-12.5 MG per tablet Take 1 tablet by mouth daily     • mometasone (ELOCON) 0.1 % cream Apply 1 Application topically once a week     •  Omega-3 Fatty Acids (FISH OIL) 645 MG CAPS Take by mouth     • Ozempic, 0.25 or 0.5 MG/DOSE, 2 MG/3ML injection pen Inject 0.75 mL (0.5 mg total) under the skin every 7 days 3 mL 2   • Probiotic Product (PROBIOTIC DAILY PO) Take 1 capsule by mouth in the morning     • SUMAtriptan (IMITREX) 50 mg tablet Take 50 mg by mouth once as needed     • Turmeric Curcumin 500 MG CAPS Take 1,000 mg by mouth in the morning     • DOCOSAHEXAENOIC ACID-EPA PO Take by mouth       No current facility-administered medications for this visit.     Allergies   Allergen Reactions   • Gluten Meal - Food Allergy Other (See Comments)     Celiac disease   • Penicillin G Hives   • Wheat Bran - Food Allergy GI Intolerance     Has celiac disease   • Diclofenac Rash     Voltaren gel   • Diclofenac Sodium Rash   • Penicillins Rash and Hives      Immunizations:     Immunization History   Administered Date(s) Administered   • COVID-19 MODERNA VACC 0.5 ML IM 01/24/2021, 02/28/2021, 11/03/2021, 05/19/2022   • COVID-19 Pfizer Vac BIVALENT Babar-sucrose 12 Yr+ IM 11/13/2022   • INFLUENZA 09/21/2015, 09/21/2015, 09/09/2016, 09/09/2016, 09/28/2017, 09/28/2017, 10/12/2018, 10/12/2018, 10/01/2019, 10/01/2019, 09/18/2020   • Influenza Split High Dose Preservative Free IM 09/26/2014, 09/23/2021, 09/23/2022   • Influenza, high dose seasonal 0.7 mL 10/04/2023   • Influenza, seasonal, injectable 10/19/2012, 10/08/2013   • Pneumococcal Conjugate 13-Valent 02/04/2015   • Pneumococcal Polysaccharide PPV23 03/28/2016, 03/28/2016   • Td (adult), adsorbed 09/16/2008   • Tdap 08/14/2020   • Zoster 03/19/2010   • Zoster Vaccine Recombinant 12/18/2019, 12/23/2019, 12/23/2019, 05/02/2020      Health Maintenance:         Topic Date Due   • Hepatitis C Screening  Never done   • Breast Cancer Screening: Mammogram  03/19/2026         Topic Date Due   • COVID-19 Vaccine (6 - 2023-24 season) 09/01/2023      Medicare Screening Tests and Risk Assessments:     Enedelia is here for her  Subsequent Wellness visit. Last Medicare Wellness visit information reviewed, patient interviewed and updates made to the record today.      Health Risk Assessment:   Patient rates overall health as good. Patient feels that their physical health rating is slightly better. Patient is satisfied with their life. Eyesight was rated as same. Hearing was rated as same. Patient feels that their emotional and mental health rating is slightly better. Patients states they are sometimes angry. Patient states they are sometimes unusually tired/fatigued. Pain experienced in the last 7 days has been none. Patient states that she has experienced no weight loss or gain in last 6 months.     Depression Screening:   PHQ-2 Score: 0      Fall Risk Screening:   In the past year, patient has experienced: no history of falling in past year      Urinary Incontinence Screening:   Patient has not leaked urine accidently in the last six months.     Home Safety:  Patient does not have trouble with stairs inside or outside of their home. Patient has working smoke alarms and has working carbon monoxide detector. Home safety hazards include: none.     Nutrition:   Current diet is Low Carb and Limited junk food. Gluten Free    Medications:   Patient is currently taking over-the-counter supplements. OTC medications include: see medication list. Patient is able to manage medications.     Activities of Daily Living (ADLs)/Instrumental Activities of Daily Living (IADLs):   Walk and transfer into and out of bed and chair?: Yes  Dress and groom yourself?: Yes    Bathe or shower yourself?: Yes    Feed yourself? Yes  Do your laundry/housekeeping?: Yes  Manage your money, pay your bills and track your expenses?: Yes  Make your own meals?: Yes    Do your own shopping?: Yes    Previous Hospitalizations:   Any hospitalizations or ED visits within the last 12 months?: No      Advance Care Planning:   Living will: Yes    Durable POA for healthcare: Yes     Advanced directive: Yes    Advanced directive counseling given: Yes    ACP document given: Yes    Patient declined ACP directive: No      Cognitive Screening:   Provider or family/friend/caregiver concerned regarding cognition?: No    PREVENTIVE SCREENINGS      Cardiovascular Screening:    General: Screening Not Indicated, History Lipid Disorder and Screening Current      Diabetes Screening:     General: Screening Not Indicated, History Diabetes and Screening Current      Colorectal Cancer Screening:     General: Screening Not Indicated      Breast Cancer Screening:     General: Screening Current      Cervical Cancer Screening:    General: Screening Not Indicated and Screening Current      Osteoporosis Screening:    General: Screening Current      Abdominal Aortic Aneurysm (AAA) Screening:        General: Screening Not Indicated      Lung Cancer Screening:     General: Screening Not Indicated      Hepatitis C Screening:    General: Screening Not Indicated    Screening, Brief Intervention, and Referral to Treatment (SBIRT)    Screening  Typical number of drinks in a day: 0  Typical number of drinks in a week: 0  Interpretation: Low risk drinking behavior.    AUDIT-C Screenin) How often did you have a drink containing alcohol in the past year? never  2) How many drinks did you have on a typical day when you were drinking in the past year? 0  3) How often did you have 6 or more drinks on one occasion in the past year? never    AUDIT-C Score: 0  Interpretation: Score 0-2 (female): Negative screen for alcohol misuse    Single Item Drug Screening:  How often have you used an illegal drug (including marijuana) or a prescription medication for non-medical reasons in the past year? never    Single Item Drug Screen Score: 0  Interpretation: Negative screen for possible drug use disorder    Other Counseling Topics:   Calcium and vitamin D intake and regular weightbearing exercise.     No results found.     Physical  "Exam:     /70 (BP Location: Right arm, Patient Position: Sitting, Cuff Size: Large)   Pulse 85   Temp (!) 95.1 °F (35.1 °C) (Tympanic)   Ht 5' 6\" (1.676 m)   Wt 96.2 kg (212 lb)   SpO2 98%   BMI 34.22 kg/m²     Physical Exam  Vitals and nursing note reviewed.   Constitutional:       General: She is not in acute distress.     Appearance: Normal appearance. She is well-developed.   HENT:      Head: Normocephalic and atraumatic.   Eyes:      Conjunctiva/sclera: Conjunctivae normal.   Neck:      Vascular: No carotid bruit.   Cardiovascular:      Rate and Rhythm: Normal rate and regular rhythm.      Heart sounds: No murmur heard.  Pulmonary:      Effort: Pulmonary effort is normal. No respiratory distress.      Breath sounds: Normal breath sounds.   Abdominal:      Palpations: There is no mass.      Tenderness: There is no abdominal tenderness.   Musculoskeletal:         General: No swelling.      Cervical back: Neck supple.      Right lower leg: No edema.      Left lower leg: No edema.   Lymphadenopathy:      Cervical: No cervical adenopathy.   Skin:     General: Skin is warm and dry.      Capillary Refill: Capillary refill takes less than 2 seconds.   Neurological:      Mental Status: She is alert and oriented to person, place, and time.   Psychiatric:         Mood and Affect: Mood normal.          Rosanne Kaba MD  "

## 2024-04-30 NOTE — ASSESSMENT & PLAN NOTE
Well-controlled therefore to continue same medications and diet.  Also continue home monitoring and she have his parameters to call if elevated.

## 2024-05-02 ENCOUNTER — OFFICE VISIT (OUTPATIENT)
Dept: INTERNAL MEDICINE CLINIC | Facility: CLINIC | Age: 79
End: 2024-05-02
Payer: MEDICARE

## 2024-05-02 VITALS
OXYGEN SATURATION: 97 % | HEART RATE: 71 BPM | TEMPERATURE: 98.1 F | DIASTOLIC BLOOD PRESSURE: 72 MMHG | WEIGHT: 212.7 LBS | HEIGHT: 66 IN | SYSTOLIC BLOOD PRESSURE: 148 MMHG | BODY MASS INDEX: 34.18 KG/M2

## 2024-05-02 DIAGNOSIS — E55.9 VITAMIN D DEFICIENCY: ICD-10-CM

## 2024-05-02 DIAGNOSIS — E03.9 ACQUIRED HYPOTHYROIDISM: ICD-10-CM

## 2024-05-02 DIAGNOSIS — K90.0 CELIAC DISEASE: ICD-10-CM

## 2024-05-02 DIAGNOSIS — J30.89 PERENNIAL ALLERGIC RHINITIS: ICD-10-CM

## 2024-05-02 DIAGNOSIS — E11.22 TYPE 2 DIABETES MELLITUS WITH STAGE 2 CHRONIC KIDNEY DISEASE, WITHOUT LONG-TERM CURRENT USE OF INSULIN  (HCC): Primary | ICD-10-CM

## 2024-05-02 DIAGNOSIS — I10 ESSENTIAL HYPERTENSION: ICD-10-CM

## 2024-05-02 DIAGNOSIS — E78.2 MIXED HYPERLIPIDEMIA: ICD-10-CM

## 2024-05-02 DIAGNOSIS — I83.893 VARICOSE VEINS OF BOTH LOWER EXTREMITIES WITH COMPLICATIONS: ICD-10-CM

## 2024-05-02 DIAGNOSIS — M10.9 GOUT, UNSPECIFIED CAUSE, UNSPECIFIED CHRONICITY, UNSPECIFIED SITE: ICD-10-CM

## 2024-05-02 DIAGNOSIS — Z23 ENCOUNTER FOR IMMUNIZATION: ICD-10-CM

## 2024-05-02 DIAGNOSIS — N18.2 TYPE 2 DIABETES MELLITUS WITH STAGE 2 CHRONIC KIDNEY DISEASE, WITHOUT LONG-TERM CURRENT USE OF INSULIN  (HCC): Primary | ICD-10-CM

## 2024-05-02 PROBLEM — Z00.00 MEDICARE ANNUAL WELLNESS VISIT, SUBSEQUENT: Status: RESOLVED | Noted: 2024-04-30 | Resolved: 2024-05-02

## 2024-05-02 PROBLEM — E66.01 MORBID (SEVERE) OBESITY DUE TO EXCESS CALORIES (HCC): Status: RESOLVED | Noted: 2023-04-13 | Resolved: 2024-05-02

## 2024-05-02 PROCEDURE — 90677 PCV20 VACCINE IM: CPT | Performed by: FAMILY MEDICINE

## 2024-05-02 PROCEDURE — G0009 ADMIN PNEUMOCOCCAL VACCINE: HCPCS | Performed by: FAMILY MEDICINE

## 2024-05-02 PROCEDURE — 99214 OFFICE O/P EST MOD 30 MIN: CPT | Performed by: FAMILY MEDICINE

## 2024-05-02 RX ORDER — FLUTICASONE PROPIONATE 50 MCG
2 SPRAY, SUSPENSION (ML) NASAL DAILY
Qty: 16 G | Refills: 2 | Status: SHIPPED | OUTPATIENT
Start: 2024-05-02

## 2024-05-03 NOTE — ASSESSMENT & PLAN NOTE
Continue with the atorvastatin.  Watch diet.  Continue to be as active as possible.  Increase fiber in diet.

## 2024-05-03 NOTE — ASSESSMENT & PLAN NOTE
Continue with the Allegra.  Refill given for her Flonase.  Watch for any worsening.  Would consider adding Singulair if symptoms worsen or persist.

## 2024-05-03 NOTE — ASSESSMENT & PLAN NOTE
Continue current dose of the levothyroxine.  Will continue to follow TSH and adjust dose if needed.

## 2024-05-03 NOTE — ASSESSMENT & PLAN NOTE
Varicose veins bilateral lower extremities left greater than right.  Discussed potentially using compression stockings although this is difficult in the summer months.  Watch for any worsening.

## 2024-05-03 NOTE — ASSESSMENT & PLAN NOTE
Blood pressure relatively well-controlled.  Continue to follow blood pressure at home.  Continue with the lisinopril/hydrochlorothiazide.  Watch salt intake.  Stay adequately hydrated.

## 2024-05-03 NOTE — ASSESSMENT & PLAN NOTE
Lab Results   Component Value Date    HGBA1C 6.3 (H) 03/20/2024     Continue to follow-up with endocrinology.  Continue current medications.  Discussed the potential benefits of these medications including cardiac and renal protection in addition to control of blood sugar and some weight loss.  Continue to follow-up with ophthalmology regularly.  Check feet daily.

## 2024-05-03 NOTE — ASSESSMENT & PLAN NOTE
Continue with vitamin D supplementation.  Will continue to follow vitamin D level and adjust dose if needed.

## 2024-05-03 NOTE — PROGRESS NOTES
Assessment/Plan:    Essential hypertension  Blood pressure relatively well-controlled.  Continue to follow blood pressure at home.  Continue with the lisinopril/hydrochlorothiazide.  Watch salt intake.  Stay adequately hydrated.    Varicose veins of both lower extremities with complications  Varicose veins bilateral lower extremities left greater than right.  Discussed potentially using compression stockings although this is difficult in the summer months.  Watch for any worsening.    Perennial allergic rhinitis  Continue with the Allegra.  Refill given for her Flonase.  Watch for any worsening.  Would consider adding Singulair if symptoms worsen or persist.    Celiac disease  Continue with gluten-free diet.  She has been adhering to this very well.    Hypothyroidism  Continue current dose of the levothyroxine.  Will continue to follow TSH and adjust dose if needed.    Type 2 diabetes mellitus with stage 2 chronic kidney disease, without long-term current use of insulin ([facility])    Lab Results   Component Value Date    HGBA1C 6.3 (H) 03/20/2024     Continue to follow-up with endocrinology.  Continue current medications.  Discussed the potential benefits of these medications including cardiac and renal protection in addition to control of blood sugar and some weight loss.  Continue to follow-up with ophthalmology regularly.  Check feet daily.    Gout  Continue with the allopurinol.  Watch for any recurrent issues.    Mixed hyperlipidemia  Continue with the atorvastatin.  Watch diet.  Continue to be as active as possible.  Increase fiber in diet.    Vitamin D deficiency  Continue with vitamin D supplementation.  Will continue to follow vitamin D level and adjust dose if needed.       Diagnoses and all orders for this visit:    Type 2 diabetes mellitus with stage 2 chronic kidney disease, without long-term current use of insulin  ([facility])  -     CBC and differential; Future  -     Comprehensive metabolic panel; Future  -      Hemoglobin A1C; Future    Acquired hypothyroidism  -     CBC and differential; Future    Varicose veins of both lower extremities with complications  -     CBC and differential; Future    Essential hypertension  -     CBC and differential; Future  -     Comprehensive metabolic panel; Future    Perennial allergic rhinitis  -     CBC and differential; Future  -     fluticasone (FLONASE) 50 mcg/act nasal spray; 2 sprays into each nostril daily    Celiac disease  -     CBC and differential; Future    Mixed hyperlipidemia  -     CBC and differential; Future  -     Lipid panel; Future  -     TSH, 3rd generation; Future    Vitamin D deficiency  -     CBC and differential; Future  -     Vitamin D 25 hydroxy; Future    Gout, unspecified cause, unspecified chronicity, unspecified site  -     CBC and differential; Future  -     Uric acid; Future    Encounter for immunization  -     CBC and differential; Future  -     Pneumococcal Conjugate Vaccine 20-valent (Pcv20)        Orders and recommendations as noted above.  Reviewed medical records with her.  Discussed screening testing and she is up-to-date on basically all of this.  Pneumonia vaccine given today.  Recommend flu shot in the fall.  Will have her follow-up in about 3 to 5 months or sooner if needed.    Subjective:      Patient ID: Enedelia Davis is a 79 y.o. female.    She presents to establish as a new patient.  Her previous physician Dr. Rosanne Kaba is retiring.  She did recently follow-up with Dr. Kaba.  She has generally been doing well.  Had been diagnosed with celiac disease many years ago and has been adhering to a gluten-free diet as much as possible.  Has not had any recurrent GI issues since doing this.  Did have an endoscopy which showed no active celiac.  Tolerating her diabetes medications without difficulty.  Does follow-up with endocrinology.  Does note some constipation symptoms with the Ozempic.  Blood sugars have been well-controlled.  Vision has been  stable.  Denies any neuropathy symptoms.  Tolerating her lisinopril/hydrochlorothiazide without difficulty.  Denies any headaches or localized weakness.  Continues with the atorvastatin.  Denies any muscle aches or weakness with this.  Denies any chest pain or palpitations.  Some joint aches especially into the knees.  Does have varicose veins left greater than right but they have not given her any pain or other complications.  Has allergies and has noticed some increase in sneezing and congestion.  She has been taking the Allegra but has not been using the Flonase.        The following portions of the patient's history were reviewed and updated as appropriate: She  has a past medical history of Allergic rhinitis, Anxiety, Celiac disease, Cerebrovascular disease, Colon polyp, COVID-19, Diabetes mellitus (HCC), Disease of thyroid gland, Diverticulosis of colon, Dry eye, Gout, Hiatal hernia, Hypertension, Hypothyroidism, Kidney stone, Nephrolithiasis, Perennial allergic rhinitis, Peripheral venous insufficiency, Rosacea conjunctivitis, bilateral, Sleep apnea, and Vitamin D deficiency.  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/27/2023    Coronary artery calcification seen on CT scan 09/19/2023    Mixed hyperlipidemia 09/19/2023    Type 2 diabetes mellitus with stage 2 chronic kidney disease, without long-term current use of insulin  (HCC) 07/17/2023    Proteinuria 04/13/2023    Renal cyst 11/18/2021    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 12/02/2019    Varicose veins of both lower extremities with complications 12/02/2019    DEEPAK on CPAP 08/08/2019    Nephrolithiasis 01/25/2019    Hiatal hernia 06/16/2018    Perennial allergic rhinitis 05/04/2018    Lichen sclerosus 12/16/2015    Polyp of colon 08/16/2015    Diverticulosis of colon  07/16/2015    Celiac disease 05/15/2014    Cerebrovascular disease 05/15/2014    Essential hypertension 05/15/2014    Gout 05/15/2014    Hypothyroidism 05/15/2014    Type 2 diabetes mellitus (HCC) 05/15/2014    Vitamin D deficiency 05/15/2014    Peripheral venous insufficiency 05/15/2014    Osteopenia 12/03/2013     She  has a past surgical history that includes Cholecystectomy; Varicose vein surgery; Tonsillectomy; Cataract extraction, bilateral (01/2020); Appendectomy (?); Tubal ligation; and pr hysteroscopy bx endometrium&/polypc w/wo d&c (N/A, 12/4/2023).  Her family history includes Diabetes in her father and sister; Heart disease in her brother, father, mother, and sister; No Known Problems in her daughter, daughter, maternal aunt, maternal grandfather, maternal grandmother, paternal aunt, paternal aunt, paternal aunt, paternal aunt, paternal grandfather, and paternal grandmother.  She  reports that she has never smoked. She has never used smokeless tobacco. She reports that she does not drink alcohol and does not use drugs.  Current Outpatient Medications   Medication Sig Dispense Refill    allopurinol (ZYLOPRIM) 100 mg tablet Take 100 mg by mouth 2 (two) times a day      aspirin (ECOTRIN LOW STRENGTH) 81 mg EC tablet Take 81 mg by mouth daily      atorvastatin (LIPITOR) 20 mg tablet Take 1 tablet (20 mg total) by mouth daily 90 tablet 5    Bexagliflozin 20 MG TABS Take 20 mg by mouth in the morning 90 tablet 3    Brimonidine Tartrate (Lumify) 0.025 % SOLN Apply 1 drop to eye 2 (two) times a day      cholecalciferol (VITAMIN D3) 1,000 units tablet Take 1,000 Units by mouth 2 (two) times a day      citalopram (CeleXA) 10 mg tablet Take 1 tablet (10 mg total) by mouth daily 90 tablet 3    clobetasol (TEMOVATE) 0.05 % cream Apply topically in the morning for 14 days Daily for 1 week and then once weekly 30 g 3    cycloSPORINE (RESTASIS) 0.05 % ophthalmic emulsion Administer 1 drop to both eyes 2 (two) times a  day      DOCOSAHEXAENOIC ACID-EPA PO Take by mouth      doxycycline (PERIOSTAT) 20 MG tablet Take 20 mg by mouth 2 (two) times a day      estradiol (ESTRACE) 0.1 mg/g vaginal cream 1 gram vaginally  weekly 42.5 g 3    fexofenadine (ALLEGRA) 180 MG tablet Take 180 mg by mouth daily      fluticasone (FLONASE) 50 mcg/act nasal spray 2 sprays into each nostril daily 16 g 2    glucose blood test strip TEST BLOOD SUGAR ONCE DIALY.      levothyroxine 75 mcg tablet Take 75 mcg by mouth daily in the early morning      lisinopril-hydrochlorothiazide (PRINZIDE,ZESTORETIC) 10-12.5 MG per tablet Take 1 tablet by mouth daily      mometasone (ELOCON) 0.1 % cream Apply 1 Application topically once a week      Omega-3 Fatty Acids (FISH OIL) 645 MG CAPS Take by mouth      Ozempic, 0.25 or 0.5 MG/DOSE, 2 MG/3ML injection pen Inject 0.75 mL (0.5 mg total) under the skin every 7 days 3 mL 2    Probiotic Product (PROBIOTIC DAILY PO) Take 1 capsule by mouth in the morning      SUMAtriptan (IMITREX) 50 mg tablet Take 50 mg by mouth once as needed      Turmeric Curcumin 500 MG CAPS Take 1,000 mg by mouth in the morning       No current facility-administered medications for this visit.     Current Outpatient Medications on File Prior to Visit   Medication Sig    allopurinol (ZYLOPRIM) 100 mg tablet Take 100 mg by mouth 2 (two) times a day    aspirin (ECOTRIN LOW STRENGTH) 81 mg EC tablet Take 81 mg by mouth daily    atorvastatin (LIPITOR) 20 mg tablet Take 1 tablet (20 mg total) by mouth daily    Bexagliflozin 20 MG TABS Take 20 mg by mouth in the morning    Brimonidine Tartrate (Lumify) 0.025 % SOLN Apply 1 drop to eye 2 (two) times a day    cholecalciferol (VITAMIN D3) 1,000 units tablet Take 1,000 Units by mouth 2 (two) times a day    citalopram (CeleXA) 10 mg tablet Take 1 tablet (10 mg total) by mouth daily    clobetasol (TEMOVATE) 0.05 % cream Apply topically in the morning for 14 days Daily for 1 week and then once weekly     cycloSPORINE (RESTASIS) 0.05 % ophthalmic emulsion Administer 1 drop to both eyes 2 (two) times a day    DOCOSAHEXAENOIC ACID-EPA PO Take by mouth    doxycycline (PERIOSTAT) 20 MG tablet Take 20 mg by mouth 2 (two) times a day    estradiol (ESTRACE) 0.1 mg/g vaginal cream 1 gram vaginally  weekly    fexofenadine (ALLEGRA) 180 MG tablet Take 180 mg by mouth daily    glucose blood test strip TEST BLOOD SUGAR ONCE DIALY.    levothyroxine 75 mcg tablet Take 75 mcg by mouth daily in the early morning    lisinopril-hydrochlorothiazide (PRINZIDE,ZESTORETIC) 10-12.5 MG per tablet Take 1 tablet by mouth daily    mometasone (ELOCON) 0.1 % cream Apply 1 Application topically once a week    Omega-3 Fatty Acids (FISH OIL) 645 MG CAPS Take by mouth    Ozempic, 0.25 or 0.5 MG/DOSE, 2 MG/3ML injection pen Inject 0.75 mL (0.5 mg total) under the skin every 7 days    Probiotic Product (PROBIOTIC DAILY PO) Take 1 capsule by mouth in the morning    SUMAtriptan (IMITREX) 50 mg tablet Take 50 mg by mouth once as needed    Turmeric Curcumin 500 MG CAPS Take 1,000 mg by mouth in the morning     No current facility-administered medications on file prior to visit.     She is allergic to gluten meal - food allergy, penicillin g, wheat bran - food allergy, diclofenac, diclofenac sodium, and penicillins..    Review of Systems   Constitutional:  Negative for activity change, appetite change, chills and fever.   HENT:  Positive for congestion and sneezing. Negative for rhinorrhea.    Eyes:  Negative for visual disturbance.   Respiratory:  Negative for chest tightness and shortness of breath.    Cardiovascular:  Negative for chest pain and palpitations.   Gastrointestinal:  Negative for abdominal pain, blood in stool, diarrhea, nausea and vomiting.   Endocrine: Negative for polydipsia, polyphagia and polyuria.        As per HPI   Genitourinary:  Negative for dysuria, frequency and urgency.   Musculoskeletal:  Positive for arthralgias. Negative for  "gait problem.   Skin:  Negative for color change.   Neurological:  Negative for dizziness and headaches.   Hematological:  Does not bruise/bleed easily.   Psychiatric/Behavioral:  Negative for confusion and sleep disturbance. The patient is not nervous/anxious.          Objective:      /72 (BP Location: Left arm, Patient Position: Sitting, Cuff Size: Large)   Pulse 71   Temp 98.1 °F (36.7 °C)   Ht 5' 6\" (1.676 m)   Wt 96.5 kg (212 lb 11.2 oz)   SpO2 97%   BMI 34.33 kg/m²          Physical Exam  Vitals and nursing note reviewed.   Constitutional:       General: She is not in acute distress.     Appearance: She is well-developed, well-groomed and overweight.   HENT:      Head: Normocephalic and atraumatic.   Eyes:      General:         Right eye: No discharge.         Left eye: No discharge.      Conjunctiva/sclera: Conjunctivae normal.      Pupils: Pupils are equal, round, and reactive to light.   Cardiovascular:      Rate and Rhythm: Normal rate and regular rhythm.      Heart sounds: Normal heart sounds. No murmur heard.     No friction rub. No gallop.   Pulmonary:      Effort: No respiratory distress.      Breath sounds: No wheezing or rales.   Abdominal:      General: Bowel sounds are normal. There is no distension.      Tenderness: There is no abdominal tenderness.   Musculoskeletal:      Comments: Degenerative changes bilateral knees   Lymphadenopathy:      Cervical: No cervical adenopathy.   Skin:     General: Skin is warm and dry.      Comments: Varicose veins lower extremities left much greater than right   Neurological:      Mental Status: She is alert and oriented to person, place, and time.   Psychiatric:         Mood and Affect: Mood and affect normal.         Speech: Speech normal.         Behavior: Behavior normal. Behavior is cooperative.         Cognition and Memory: Cognition and memory normal.         Below is the patient's most recent value for Albumin, ALT, AST, BUN, Calcium, Chloride, " Cholesterol, CO2, Creatinine, GFR, Glucose, HDL, Hematocrit, Hemoglobin, Hemoglobin A1C, LDL, Magnesium, Phosphorus, Platelets, Potassium, PSA, Sodium, Triglycerides, and WBC.   Lab Results   Component Value Date    ALT 19 04/17/2024    AST 18 04/17/2024    BUN 17 04/17/2024    CALCIUM 9.2 04/17/2024     04/17/2024    CO2 29 04/17/2024    CREATININE 0.75 04/17/2024    HDL 36 (L) 03/20/2024    HCT 47.3 (H) 11/29/2023    HGB 14.9 11/29/2023    HGBA1C 6.3 (H) 03/20/2024     11/29/2023    K 3.8 04/17/2024    TRIG 117 03/20/2024    WBC 6.20 11/29/2023     Note: for a comprehensive list of the patient's lab results, access the Results Review activity.

## 2024-05-21 ENCOUNTER — HOSPITAL ENCOUNTER (OUTPATIENT)
Dept: ULTRASOUND IMAGING | Facility: HOSPITAL | Age: 79
Discharge: HOME | End: 2024-05-21
Payer: MEDICARE

## 2024-05-21 DIAGNOSIS — N28.1 RENAL CYST: ICD-10-CM

## 2024-05-21 DIAGNOSIS — N20.0 NEPHROLITHIASIS: ICD-10-CM

## 2024-05-21 PROCEDURE — 76775 US EXAM ABDO BACK WALL LIM: CPT

## 2024-05-29 ENCOUNTER — TELEPHONE (OUTPATIENT)
Dept: UROLOGY | Facility: CLINIC | Age: 79
End: 2024-05-29

## 2024-05-29 NOTE — TELEPHONE ENCOUNTER
Spoke with Iris at [facility] to have US kidney and bladder ead for appt on 5/30/2024

## 2024-05-30 ENCOUNTER — OFFICE VISIT (OUTPATIENT)
Dept: UROLOGY | Facility: CLINIC | Age: 79
End: 2024-05-30
Payer: MEDICARE

## 2024-05-30 VITALS
BODY MASS INDEX: 34.07 KG/M2 | SYSTOLIC BLOOD PRESSURE: 126 MMHG | HEIGHT: 66 IN | DIASTOLIC BLOOD PRESSURE: 72 MMHG | HEART RATE: 72 BPM | WEIGHT: 212 LBS | OXYGEN SATURATION: 99 %

## 2024-05-30 DIAGNOSIS — N20.0 NEPHROLITHIASIS: ICD-10-CM

## 2024-05-30 DIAGNOSIS — N28.1 RENAL CYST: Primary | ICD-10-CM

## 2024-05-30 PROCEDURE — 99213 OFFICE O/P EST LOW 20 MIN: CPT

## 2024-05-30 NOTE — PROGRESS NOTES
5/30/2024    Chief Complaint   Patient presents with    Follow-up     Annual follow-up evaluation of right renal cyst, right angiomyolipoma, and left renal calculi. Review US       Assessment and Plan    79 y.o. female manage by AP team and Dr. Fu    Renal cyst  Nephrolithiasis   Stable 7 mm right renal angiomyolipoma as well as stable 4 mm nonobstructing left renal calculi.  Due to stability I do not feel the need to continue with yearly surveillance.  We can check a ultrasound in 2 years with follow-up.  She will call with any concerns in the meantime and we would be happy to see her sooner.      Interval HPI:    History of Present Illness  Enedelia Davis is a 79 y.o. female here for annual follow-up evaluation of right renal cyst, right angiomyolipoma, and left renal calculi.    Established patient but new to me last seen by Gladys Moreira PA-C May 2023.  She is a history of nonobstructing left-sided midpole renal calculi present on imaging since 2009 ranging measurements from 3 to 8 mm.  She has never had a stone episode required any stone surgery in the past.  Ultrasounds have also demonstrated a subcentimeter simple renal cyst and a subcentimeter probable angiomyolipoma both on the right side.  They have also shown stability over the years.  Renal ultrasound from 5/21/2024 shows stable 7 mm right renal mid to lower pole cortical hyperechoic focus, which could represent a tiny angiomyolipoma.  Stable 15 mm right upper pole renal cyst.  As well as stable 4 mm nonobstructing left lower pole calculi.            Review of Systems   Constitutional:  Negative for chills and fever.   HENT:  Negative for ear pain and sore throat.    Eyes:  Negative for pain and visual disturbance.   Respiratory:  Negative for cough and shortness of breath.    Cardiovascular:  Negative for chest pain and palpitations.   Gastrointestinal:  Negative for abdominal pain and vomiting.   Genitourinary:  Negative for dysuria and  "hematuria.   Musculoskeletal:  Negative for arthralgias and back pain.   Skin:  Negative for color change and rash.   Neurological:  Negative for seizures and syncope.   All other systems reviewed and are negative.              Vitals  Vitals:    05/30/24 1305   BP: 126/72   Pulse: 72   SpO2: 99%   Weight: 96.2 kg (212 lb)   Height: 5' 6\" (1.676 m)       Physical Exam  Vitals reviewed.   Constitutional:       General: She is not in acute distress.     Appearance: Normal appearance. She is normal weight. She is not ill-appearing or toxic-appearing.   HENT:      Head: Normocephalic and atraumatic.      Nose: Nose normal.   Eyes:      General: No scleral icterus.     Conjunctiva/sclera: Conjunctivae normal.   Cardiovascular:      Rate and Rhythm: Normal rate.      Pulses: Normal pulses.   Pulmonary:      Effort: Pulmonary effort is normal. No respiratory distress.   Abdominal:      General: Abdomen is flat.      Palpations: Abdomen is soft.      Tenderness: There is no abdominal tenderness. There is no right CVA tenderness or left CVA tenderness.      Hernia: No hernia is present.   Musculoskeletal:         General: Normal range of motion.      Cervical back: Normal range of motion.   Skin:     General: Skin is warm and dry.   Neurological:      General: No focal deficit present.      Mental Status: She is alert and oriented to person, place, and time. Mental status is at baseline.   Psychiatric:         Mood and Affect: Mood normal.         Behavior: Behavior normal.         Thought Content: Thought content normal.         Judgment: Judgment normal.         Past History  Past Medical History:   Diagnosis Date    Allergic rhinitis     Anxiety     Celiac disease     Cerebrovascular disease     Colon polyp     COVID-19     9/11/23    Diabetes mellitus (HCC)     Disease of thyroid gland     Diverticulosis of colon     Dry eye     Gout     Hiatal hernia     Hypertension     Hypothyroidism     Kidney stone     " Nephrolithiasis     Perennial allergic rhinitis     Peripheral venous insufficiency     Rosacea conjunctivitis, bilateral     Sleep apnea     Vitamin D deficiency      Social History     Socioeconomic History    Marital status: /Civil Union     Spouse name: None    Number of children: None    Years of education: None    Highest education level: None   Occupational History    None   Tobacco Use    Smoking status: Never    Smokeless tobacco: Never   Vaping Use    Vaping status: Never Used   Substance and Sexual Activity    Alcohol use: No     Comment: rarely    Drug use: No    Sexual activity: Yes     Partners: Male     Birth control/protection: Post-menopausal   Other Topics Concern    None   Social History Narrative    None     Social Determinants of Health     Financial Resource Strain: Not on file   Food Insecurity: No Food Insecurity (4/25/2024)    Hunger Vital Sign     Worried About Running Out of Food in the Last Year: Never true     Ran Out of Food in the Last Year: Never true   Transportation Needs: No Transportation Needs (4/25/2024)    PRAPARE - Transportation     Lack of Transportation (Medical): No     Lack of Transportation (Non-Medical): No   Physical Activity: Not on file   Stress: Not on file   Social Connections: Not on file   Intimate Partner Violence: Not on file   Housing Stability: Unknown (4/25/2024)    Housing Stability Vital Sign     Unable to Pay for Housing in the Last Year: No     Number of Times Moved in the Last Year: Not on file     Homeless in the Last Year: Not on file     Social History     Tobacco Use   Smoking Status Never   Smokeless Tobacco Never     Family History   Problem Relation Age of Onset    Heart disease Mother     Heart disease Father     Diabetes Father     Heart disease Sister     Diabetes Sister     No Known Problems Daughter     No Known Problems Maternal Grandmother     No Known Problems Maternal Grandfather     No Known Problems Paternal Grandmother     No  Known Problems Paternal Grandfather     No Known Problems Daughter     No Known Problems Maternal Aunt     No Known Problems Paternal Aunt     No Known Problems Paternal Aunt     No Known Problems Paternal Aunt     No Known Problems Paternal Aunt     Heart disease Brother        The following portions of the patient's history were reviewed and updated as appropriate allergies, current medications, past medical history, past social history, past surgical history and problem list    Imagin/21/2024    RENAL ULTRASOUND     INDICATION: N28.1: Cyst of kidney, acquired  N20.0: Calculus of kidney.     COMPARISON: Abdominal ultrasound 2023.     TECHNIQUE: Ultrasound of the retroperitoneum was performed with a curvilinear transducer utilizing volumetric sweeps and still imaging techniques.     FINDINGS:     KIDNEYS:  Symmetric and normal size.  Right kidney: 11.1 x 5.7 x 5.6 cm. Volume 186.8 mL  Left kidney: 12.0 x 5.3 x 3.5 cm. Volume 118.7 mL     Right kidney  Normal echogenicity and contour.  Stable 7 mm right mid to lower pole renal cortical hyperechoic focus. Stable 15 mm upper pole simple cyst.  No hydronephrosis. Stable probable extrarenal pelvis.  No shadowing calculi.  No perinephric fluid collections.     Left kidney  Normal echogenicity and contour.  No mass is identified.  No hydronephrosis. Stable probable extrarenal pelvis.  Nonobstructing 4 mm lower pole calculus.  No perinephric fluid collections.     URETERS:  Nonvisualized.     BLADDER:  Under distended, somewhat limiting evaluation.  No focal thickening or mass lesions.  Bilateral ureteral jets detected.        IMPRESSION:     Stable 7 mm right renal mid to lower pole cortical hyperechoic focus, which could represent a tiny angiomyolipoma.     Stable right renal simple cyst.     Nonobstructing left renal calculus. No hydronephrosis.  Results  No results found for this or any previous visit (from the past 1 hour(s)).]  No results found for:  "\"PSA\"  Lab Results   Component Value Date    CALCIUM 9.2 04/17/2024    K 3.8 04/17/2024    CO2 29 04/17/2024     04/17/2024    BUN 17 04/17/2024    CREATININE 0.75 04/17/2024     Lab Results   Component Value Date    WBC 6.20 11/29/2023    HGB 14.9 11/29/2023    HCT 47.3 (H) 11/29/2023    MCV 98 11/29/2023     11/29/2023       Please Note:  Voice dictation software has been used to create this document. There may be inadvertent transcriptions errors.     GILBERTO Solomon 05/30/24   "

## 2024-05-31 DIAGNOSIS — E78.2 MIXED HYPERLIPIDEMIA: ICD-10-CM

## 2024-06-01 RX ORDER — ATORVASTATIN CALCIUM 20 MG/1
TABLET, FILM COATED ORAL
Qty: 90 TABLET | Refills: 3 | Status: SHIPPED | OUTPATIENT
Start: 2024-06-01

## 2024-06-03 DIAGNOSIS — E11.9 TYPE 2 DIABETES MELLITUS WITHOUT COMPLICATION, WITHOUT LONG-TERM CURRENT USE OF INSULIN (HCC): Primary | ICD-10-CM

## 2024-06-03 DIAGNOSIS — N95.2 ATROPHIC VAGINITIS: ICD-10-CM

## 2024-06-03 RX ORDER — ESTRADIOL 0.1 MG/G
CREAM VAGINAL
Qty: 42.5 G | Refills: 0 | OUTPATIENT
Start: 2024-06-03

## 2024-06-03 NOTE — TELEPHONE ENCOUNTER
Reason for call:   [x] Refill   [] Prior Auth  [] Other:     Office:   [] PCP/Provider -   [x] Specialty/Provider - OBGYN    Medication:         Does the patient have enough for 3 days?   [] Yes   [x] No - Send as HP to POD

## 2024-07-02 DIAGNOSIS — E03.9 ACQUIRED HYPOTHYROIDISM: ICD-10-CM

## 2024-07-02 DIAGNOSIS — I10 ESSENTIAL HYPERTENSION: Primary | ICD-10-CM

## 2024-07-03 RX ORDER — LISINOPRIL AND HYDROCHLOROTHIAZIDE 12.5; 1 MG/1; MG/1
1 TABLET ORAL DAILY
Qty: 90 TABLET | Refills: 3 | Status: SHIPPED | OUTPATIENT
Start: 2024-07-03

## 2024-07-03 RX ORDER — LEVOTHYROXINE SODIUM 0.07 MG/1
75 TABLET ORAL
Qty: 90 TABLET | Refills: 3 | Status: SHIPPED | OUTPATIENT
Start: 2024-07-03

## 2024-07-09 ENCOUNTER — APPOINTMENT (OUTPATIENT)
Dept: LAB | Facility: MEDICAL CENTER | Age: 79
End: 2024-07-09
Payer: MEDICARE

## 2024-07-09 DIAGNOSIS — E55.9 VITAMIN D DEFICIENCY: ICD-10-CM

## 2024-07-09 DIAGNOSIS — E11.22 TYPE 2 DIABETES MELLITUS WITH STAGE 2 CHRONIC KIDNEY DISEASE, WITHOUT LONG-TERM CURRENT USE OF INSULIN  (HCC): ICD-10-CM

## 2024-07-09 DIAGNOSIS — E03.9 ACQUIRED HYPOTHYROIDISM: ICD-10-CM

## 2024-07-09 DIAGNOSIS — K90.0 CELIAC DISEASE: ICD-10-CM

## 2024-07-09 DIAGNOSIS — I83.893 VARICOSE VEINS OF BOTH LOWER EXTREMITIES WITH COMPLICATIONS: ICD-10-CM

## 2024-07-09 DIAGNOSIS — N18.2 TYPE 2 DIABETES MELLITUS WITH STAGE 2 CHRONIC KIDNEY DISEASE, WITHOUT LONG-TERM CURRENT USE OF INSULIN  (HCC): ICD-10-CM

## 2024-07-09 DIAGNOSIS — J30.89 PERENNIAL ALLERGIC RHINITIS: ICD-10-CM

## 2024-07-09 DIAGNOSIS — E78.2 MIXED HYPERLIPIDEMIA: ICD-10-CM

## 2024-07-09 DIAGNOSIS — Z23 ENCOUNTER FOR IMMUNIZATION: ICD-10-CM

## 2024-07-09 DIAGNOSIS — I10 ESSENTIAL HYPERTENSION: ICD-10-CM

## 2024-07-09 DIAGNOSIS — M10.9 GOUT, UNSPECIFIED CAUSE, UNSPECIFIED CHRONICITY, UNSPECIFIED SITE: ICD-10-CM

## 2024-07-09 LAB
EST. AVERAGE GLUCOSE BLD GHB EST-MCNC: 128 MG/DL
HBA1C MFR BLD: 6.1 %

## 2024-07-09 PROCEDURE — 83036 HEMOGLOBIN GLYCOSYLATED A1C: CPT

## 2024-07-09 PROCEDURE — 36415 COLL VENOUS BLD VENIPUNCTURE: CPT

## 2024-07-12 ENCOUNTER — OFFICE VISIT (OUTPATIENT)
Dept: ENDOCRINOLOGY | Facility: CLINIC | Age: 79
End: 2024-07-12
Payer: MEDICARE

## 2024-07-12 VITALS
OXYGEN SATURATION: 98 % | HEART RATE: 81 BPM | BODY MASS INDEX: 34.17 KG/M2 | DIASTOLIC BLOOD PRESSURE: 80 MMHG | WEIGHT: 212.6 LBS | SYSTOLIC BLOOD PRESSURE: 146 MMHG | HEIGHT: 66 IN

## 2024-07-12 DIAGNOSIS — N18.31 STAGE 3A CHRONIC KIDNEY DISEASE (HCC): ICD-10-CM

## 2024-07-12 DIAGNOSIS — E11.22 TYPE 2 DIABETES MELLITUS WITH STAGE 2 CHRONIC KIDNEY DISEASE, WITHOUT LONG-TERM CURRENT USE OF INSULIN  (HCC): Primary | ICD-10-CM

## 2024-07-12 DIAGNOSIS — I10 ESSENTIAL HYPERTENSION: ICD-10-CM

## 2024-07-12 DIAGNOSIS — N18.2 TYPE 2 DIABETES MELLITUS WITH STAGE 2 CHRONIC KIDNEY DISEASE, WITHOUT LONG-TERM CURRENT USE OF INSULIN  (HCC): Primary | ICD-10-CM

## 2024-07-12 DIAGNOSIS — E78.2 MIXED HYPERLIPIDEMIA: ICD-10-CM

## 2024-07-12 PROCEDURE — G2211 COMPLEX E/M VISIT ADD ON: HCPCS | Performed by: STUDENT IN AN ORGANIZED HEALTH CARE EDUCATION/TRAINING PROGRAM

## 2024-07-12 PROCEDURE — 99214 OFFICE O/P EST MOD 30 MIN: CPT | Performed by: STUDENT IN AN ORGANIZED HEALTH CARE EDUCATION/TRAINING PROGRAM

## 2024-07-12 NOTE — PROGRESS NOTES
Enedelia Davis 79 y.o. female MRN: [id number]    Encounter: [id number]      Assessment & Plan     Problem List Items Addressed This Visit     Essential hypertension     Fair control. Will monitor         Type 2 diabetes mellitus with stage 2 chronic kidney disease, without long-term current use of insulin  (HCC) - Primary     Well controlled. Today, no changes in Rx were pursued. I recommended attempting lifestyle changes, like reducing ice cream consumption by half. This may help with reducing weight. We will plan to reassess in 4-mo         Relevant Orders    Insulin, fasting    Comprehensive metabolic panel    Hemoglobin A1C    Mixed hyperlipidemia     Good control on statin        Other Visit Diagnoses     Stage 3a chronic kidney disease (HCC)            RTC 4-mo    CC: T2DM    History of Present Illness     HPI:    Enedelia returns today for f/u of type 2 diabetes. She is here today with her .    No acute concerns today. Weight has been stable, although Enedelia would like to see lowering of weight. She mentions history of celiac's, and will follow gluten free diet, however she mentions nightly consumption of ice cream. She is on ozempic 0.5 mg weekly (tolerating) and brenzavvy 20 mg daily. Brenzavvy was used in place of farxiga due to costs.     She has prior intolerance to metformin causing GI side effects.     For hyperlipidemia she takes lipitor 20 mg daily. She is on ACE-thiazide combination for hypertension.      Review of Systems   Constitutional:  Negative for diaphoresis and unexpected weight change.   Gastrointestinal:  Negative for nausea and vomiting.   Endocrine: Negative for polydipsia and polyuria.   All other systems reviewed and are negative.      Historical Information   Past Medical History:   Diagnosis Date   • Allergic rhinitis    • Anxiety    • Celiac disease    • Cerebrovascular disease    • Colon polyp    • COVID-19     9/11/23   • Diabetes mellitus (HCC)    • Disease of thyroid gland    •  Diverticulosis of colon    • Dry eye    • Gout    • Hiatal hernia    • Hypertension    • Hypothyroidism    • Kidney stone    • Nephrolithiasis    • Perennial allergic rhinitis    • Peripheral venous insufficiency    • Rosacea conjunctivitis, bilateral    • Sleep apnea    • Vitamin D deficiency      Past Surgical History:   Procedure Laterality Date   • APPENDECTOMY  ?   • CATARACT EXTRACTION, BILATERAL  01/2020   • CHOLECYSTECTOMY     • CT HYSTEROSCOPY BX ENDOMETRIUM&/POLYPC W/WO D&C N/A 12/4/2023    Procedure: DILATATION AND CURETTAGE (D&C) WITH HYSTEROSCOPY WITH POLYPECTOMY;  Surgeon: Maik Bob DO;  Location: [facility];  Service: Gynecology   • TONSILLECTOMY     • TUBAL LIGATION     • VARICOSE VEIN SURGERY       Social History   Social History     Substance and Sexual Activity   Alcohol Use No    Comment: rarely     Social History     Substance and Sexual Activity   Drug Use No     Social History     Tobacco Use   Smoking Status Never   Smokeless Tobacco Never     Family History:   Family History   Problem Relation Age of Onset   • Heart disease Mother    • Heart disease Father    • Diabetes Father    • Heart disease Sister    • Diabetes Sister    • No Known Problems Daughter    • No Known Problems Maternal Grandmother    • No Known Problems Maternal Grandfather    • No Known Problems Paternal Grandmother    • No Known Problems Paternal Grandfather    • No Known Problems Daughter    • No Known Problems Maternal Aunt    • No Known Problems Paternal Aunt    • No Known Problems Paternal Aunt    • No Known Problems Paternal Aunt    • No Known Problems Paternal Aunt    • Heart disease Brother        Meds/Allergies   Current Outpatient Medications   Medication Sig Dispense Refill   • allopurinol (ZYLOPRIM) 100 mg tablet Take 100 mg by mouth 2 (two) times a day     • aspirin (ECOTRIN LOW STRENGTH) 81 mg EC tablet Take 81 mg by mouth daily     • atorvastatin (LIPITOR) 20 mg tablet TAKE ONE (1) TABLET (20 MG  TOTAL) BY MOUTH DAILY 90 tablet 3   • Bexagliflozin 20 MG TABS Take 20 mg by mouth in the morning 90 tablet 3   • Brimonidine Tartrate (Lumify) 0.025 % SOLN Apply 1 drop to eye 2 (two) times a day     • cholecalciferol (VITAMIN D3) 1,000 units tablet Take 1,000 Units by mouth 2 (two) times a day     • citalopram (CeleXA) 10 mg tablet Take 1 tablet (10 mg total) by mouth daily 90 tablet 3   • cycloSPORINE (RESTASIS) 0.05 % ophthalmic emulsion Administer 1 drop to both eyes 2 (two) times a day     • DOCOSAHEXAENOIC ACID-EPA PO Take by mouth     • doxycycline (PERIOSTAT) 20 MG tablet Take 20 mg by mouth 2 (two) times a day     • estradiol (ESTRACE) 0.1 mg/g vaginal cream 1 gram vaginally  weekly 42.5 g 3   • fexofenadine (ALLEGRA) 180 MG tablet Take 180 mg by mouth daily     • fluticasone (FLONASE) 50 mcg/act nasal spray 2 sprays into each nostril daily 16 g 2   • GNP Easy Touch Glucose Test test strip TEST BLOOD SUGAR ONCE DIALY. 100 strip 5   • levothyroxine 75 mcg tablet Take 1 tablet (75 mcg total) by mouth daily in the early morning 90 tablet 3   • lisinopril-hydrochlorothiazide (PRINZIDE,ZESTORETIC) 10-12.5 MG per tablet Take 1 tablet by mouth daily 90 tablet 3   • mometasone (ELOCON) 0.1 % cream Apply 1 Application topically once a week     • Probiotic Product (PROBIOTIC DAILY PO) Take 1 capsule by mouth in the morning     • SUMAtriptan (IMITREX) 50 mg tablet Take 50 mg by mouth once as needed     • Turmeric Curcumin 500 MG CAPS Take 1,000 mg by mouth in the morning     • clobetasol (TEMOVATE) 0.05 % cream Apply topically in the morning for 14 days Daily for 1 week and then once weekly 30 g 3   • Omega-3 Fatty Acids (FISH OIL) 645 MG CAPS Take by mouth (Patient not taking: Reported on 5/30/2024)       No current facility-administered medications for this visit.     Allergies   Allergen Reactions   • Gluten Meal - Food Allergy Other (See Comments)     Celiac disease   • Penicillin G Hives   • Wheat Bran - Food  "Allergy GI Intolerance     Has celiac disease   • Diclofenac Rash     Voltaren gel   • Diclofenac Sodium Rash   • Penicillins Rash and Hives       Objective   Vitals: Blood pressure 146/80, pulse 81, height 5' 6\" (1.676 m), weight 96.4 kg (212 lb 9.6 oz), SpO2 98%.    Physical Exam  Vitals reviewed.   Constitutional:       General: She is not in acute distress.     Appearance: Normal appearance.   HENT:      Head: Normocephalic and atraumatic.      Nose: Nose normal.   Eyes:      General: No scleral icterus.     Conjunctiva/sclera: Conjunctivae normal.   Pulmonary:      Effort: Pulmonary effort is normal. No respiratory distress.   Musculoskeletal:         General: No deformity.      Cervical back: Normal range of motion.   Skin:     General: Skin is warm and dry.   Neurological:      General: No focal deficit present.      Mental Status: She is alert.   Psychiatric:         Mood and Affect: Mood normal.         Behavior: Behavior normal.         The history was obtained from the review of the chart, patient and family.    Lab Results:   Lab Results   Component Value Date/Time    Hemoglobin A1C 6.1 (H) 07/09/2024 08:36 AM    Hemoglobin A1C 6.3 (H) 03/20/2024 07:53 AM    Hemoglobin A1C 6.1 (H) 11/20/2023 08:33 AM    WBC 6.20 11/29/2023 08:29 AM    WBC 5.50 08/04/2023 07:41 AM    Hemoglobin 14.9 11/29/2023 08:29 AM    Hemoglobin 14.4 08/04/2023 07:41 AM    Hematocrit 47.3 (H) 11/29/2023 08:29 AM    Hematocrit 44.7 08/04/2023 07:41 AM    MCV 98 11/29/2023 08:29 AM    MCV 99 (H) 08/04/2023 07:41 AM    Platelets 235 11/29/2023 08:29 AM    Platelets 238 08/04/2023 07:41 AM    BUN 17 04/17/2024 07:43 AM    BUN 13 10/24/2023 08:01 AM    BUN 19 08/04/2023 07:41 AM    Potassium 3.8 04/17/2024 07:43 AM    Potassium 4.1 10/24/2023 08:01 AM    Potassium 3.9 08/04/2023 07:41 AM    Chloride 102 04/17/2024 07:43 AM    Chloride 101 10/24/2023 08:01 AM    Chloride 107 08/04/2023 07:41 AM    CO2 29 04/17/2024 07:43 AM    CO2 31 " "10/24/2023 08:01 AM    CO2 28 08/04/2023 07:41 AM    Creatinine 0.75 04/17/2024 07:43 AM    Creatinine 0.79 10/24/2023 08:01 AM    Creatinine 0.95 08/04/2023 07:41 AM    AST 18 04/17/2024 07:43 AM    AST 20 10/24/2023 08:01 AM    AST 25 08/04/2023 07:41 AM    ALT 19 04/17/2024 07:43 AM    ALT 19 10/24/2023 08:01 AM    ALT 35 08/04/2023 07:41 AM    Total Protein 7.2 04/17/2024 07:43 AM    Total Protein 6.7 10/24/2023 08:01 AM    Total Protein 6.9 10/24/2023 08:01 AM    Total Protein 7.5 08/04/2023 07:41 AM    Albumin 4.2 04/17/2024 07:43 AM    Albumin 4.2 10/24/2023 08:01 AM    Albumin 3.6 08/04/2023 07:41 AM    HDL, Direct 36 (L) 03/20/2024 07:53 AM    HDL, Direct 39 (L) 11/20/2023 08:33 AM    HDL, Direct 36 (L) 10/26/2023 07:31 AM    Triglycerides 117 03/20/2024 07:53 AM    Triglycerides 86 11/20/2023 08:33 AM    Triglycerides 84 10/26/2023 07:31 AM           Imaging Studies: I have personally reviewed pertinent reports.      Portions of the record may have been created with voice recognition software. Occasional wrong word or \"sound a like\" substitutions may have occurred due to the inherent limitations of voice recognition software. Read the chart carefully and recognize, using context, where substitutions have occurred.    "

## 2024-07-12 NOTE — ASSESSMENT & PLAN NOTE
Well controlled. Today, no changes in Rx were pursued. I recommended attempting lifestyle changes, like reducing ice cream consumption by half. This may help with reducing weight. We will plan to reassess in 4-mo

## 2024-07-17 ENCOUNTER — TELEPHONE (OUTPATIENT)
Age: 79
End: 2024-07-17

## 2024-07-17 ENCOUNTER — TELEPHONE (OUTPATIENT)
Dept: INTERNAL MEDICINE CLINIC | Facility: CLINIC | Age: 79
End: 2024-07-17

## 2024-07-17 NOTE — TELEPHONE ENCOUNTER
Pt called stated that she is going on vacation tomorrow coming back Sunday evening ask can she can wait until evening to take her Ozempic shot I check with office pt could take shot when she returns on Sunday.

## 2024-07-17 NOTE — TELEPHONE ENCOUNTER
Patient called concerned as she takes her ozempic shot on a Sunday at 9am. She is leaving for vacation tomorrow, and will not be home until very late Sunday. She didn't know what she should do.    Did advise that it is ok to take the Ozempic shot when she returns home from vacation and it will not hurt anything to be a few hours to a day late for the shot.

## 2024-08-12 ENCOUNTER — TELEPHONE (OUTPATIENT)
Age: 79
End: 2024-08-12

## 2024-08-12 DIAGNOSIS — E11.22 TYPE 2 DIABETES MELLITUS WITH STAGE 2 CHRONIC KIDNEY DISEASE, WITHOUT LONG-TERM CURRENT USE OF INSULIN  (HCC): Primary | ICD-10-CM

## 2024-08-12 DIAGNOSIS — N18.2 TYPE 2 DIABETES MELLITUS WITH STAGE 2 CHRONIC KIDNEY DISEASE, WITHOUT LONG-TERM CURRENT USE OF INSULIN  (HCC): Primary | ICD-10-CM

## 2024-08-12 NOTE — TELEPHONE ENCOUNTER
Patient called the RX Refill Line. Message is being forwarded to the office.     Patient is requesting :    Ozempic .5 mg, 0.75 every 7 days, 3 ml    Pharmacy: [facility] [place]    Med is not on patients active med list

## 2024-08-13 ENCOUNTER — TELEPHONE (OUTPATIENT)
Dept: PODIATRY | Facility: CLINIC | Age: 79
End: 2024-08-13

## 2024-08-13 NOTE — TELEPHONE ENCOUNTER
Caller: Enedelia Davis    Doctor and/or Office: Alessandro Blackwood DPM    #: [phone]    Escalation: I went into Enedelia's chart to see when she was last seen by Dr. Jorge.

## 2024-09-05 ENCOUNTER — OFFICE VISIT (OUTPATIENT)
Dept: PODIATRY | Facility: CLINIC | Age: 79
End: 2024-09-05
Payer: MEDICARE

## 2024-09-05 VITALS
WEIGHT: 216 LBS | RESPIRATION RATE: 16 BRPM | TEMPERATURE: 98.3 F | HEART RATE: 69 BPM | HEIGHT: 66 IN | OXYGEN SATURATION: 98 % | SYSTOLIC BLOOD PRESSURE: 116 MMHG | DIASTOLIC BLOOD PRESSURE: 74 MMHG | BODY MASS INDEX: 34.72 KG/M2

## 2024-09-05 DIAGNOSIS — L85.3 XEROSIS OF SKIN: ICD-10-CM

## 2024-09-05 DIAGNOSIS — L60.3 DYSTROPHIC NAIL: Primary | ICD-10-CM

## 2024-09-05 DIAGNOSIS — E11.22 CONTROLLED TYPE 2 DIABETES MELLITUS WITH STAGE 2 CHRONIC KIDNEY DISEASE, WITHOUT LONG-TERM CURRENT USE OF INSULIN  (HCC): ICD-10-CM

## 2024-09-05 DIAGNOSIS — N18.2 CONTROLLED TYPE 2 DIABETES MELLITUS WITH STAGE 2 CHRONIC KIDNEY DISEASE, WITHOUT LONG-TERM CURRENT USE OF INSULIN  (HCC): ICD-10-CM

## 2024-09-05 PROCEDURE — 99212 OFFICE O/P EST SF 10 MIN: CPT | Performed by: PODIATRIST

## 2024-09-05 RX ORDER — ACETAMINOPHEN 160 MG
TABLET,DISINTEGRATING ORAL
COMMUNITY

## 2024-09-05 NOTE — PROGRESS NOTES
Ambulatory Visit  Name: Enedelia Davis      : 1945      MRN: [id number]  Encounter Provider: Alessandro Blackwood DPM  Encounter Date: 2024   Encounter department: [facility] [place]    Assessment & Plan   1. Dystrophic nail  2. Xerosis of skin  3. Controlled type 2 diabetes mellitus with stage 2 chronic kidney disease, without long-term current use of insulin  (HCC)    Trim dystrophic nails manually with the use of a nail nippers and smooth out the edges of the nails with the use of a electric Dremel drill.    Pt was instructed to use lotion once a day on both feet such as cerave, Cetaphil or similar.    Return in about 11 weeks (around 2024).    History of Present Illness     Enedelia Davis is a 79 y.o. female who presents chief complaint of painful nails on both feet.  She is a diabetic.    Review of Systems  Medical History Reviewed by provider this encounter:       Current Outpatient Medications on File Prior to Visit   Medication Sig Dispense Refill    allopurinol (ZYLOPRIM) 100 mg tablet Take 100 mg by mouth 2 (two) times a day      aspirin (ECOTRIN LOW STRENGTH) 81 mg EC tablet Take 81 mg by mouth daily      atorvastatin (LIPITOR) 20 mg tablet TAKE ONE (1) TABLET (20 MG TOTAL) BY MOUTH DAILY 90 tablet 3    Bexagliflozin 20 MG TABS Take 20 mg by mouth in the morning 90 tablet 3    Brimonidine Tartrate (Lumify) 0.025 % SOLN Apply 1 drop to eye 2 (two) times a day      cholecalciferol (VITAMIN D3) 1,000 units tablet Take 1,000 Units by mouth 2 (two) times a day      citalopram (CeleXA) 10 mg tablet Take 1 tablet (10 mg total) by mouth daily 90 tablet 3    cycloSPORINE (RESTASIS) 0.05 % ophthalmic emulsion Administer 1 drop to both eyes 2 (two) times a day      DOCOSAHEXAENOIC ACID-EPA PO Take by mouth      doxycycline (PERIOSTAT) 20 MG tablet Take 20 mg by mouth 2 (two) times a day      estradiol (ESTRACE) 0.1 mg/g vaginal cream 1 gram vaginally  weekly 42.5 g 3    fexofenadine (ALLEGRA)  "180 MG tablet Take 180 mg by mouth daily      fluticasone (FLONASE) 50 mcg/act nasal spray 2 sprays into each nostril daily 16 g 2    GNP Easy Touch Glucose Test test strip TEST BLOOD SUGAR ONCE DIALY. 100 strip 5    levothyroxine 75 mcg tablet Take 1 tablet (75 mcg total) by mouth daily in the early morning 90 tablet 3    lisinopril-hydrochlorothiazide (PRINZIDE,ZESTORETIC) 10-12.5 MG per tablet Take 1 tablet by mouth daily 90 tablet 3    mometasone (ELOCON) 0.1 % cream Apply 1 Application topically once a week      Probiotic Product (PROBIOTIC DAILY PO) Take 1 capsule by mouth in the morning      semaglutide, 0.25 or 0.5 mg/dose, (Ozempic, 0.25 or 0.5 MG/DOSE,) 2 mg/3 mL injection pen Inject 0.75 mL (0.5 mg total) under the skin every 7 days 3 mL 3    SUMAtriptan (IMITREX) 50 mg tablet Take 50 mg by mouth once as needed      Turmeric Curcumin 500 MG CAPS Take 1,000 mg by mouth in the morning      Cholecalciferol (Vitamin D3) 50 MCG (2000 UT) capsule       clobetasol (TEMOVATE) 0.05 % cream Apply topically in the morning for 14 days Daily for 1 week and then once weekly 30 g 3    Omega-3 Fatty Acids (FISH OIL) 645 MG CAPS Take by mouth (Patient not taking: Reported on 5/30/2024)       No current facility-administered medications on file prior to visit.      Objective     /74   Pulse 69   Temp 98.3 °F (36.8 °C) (Temporal)   Resp 16   Ht 5' 6\" (1.676 m)   Wt 98 kg (216 lb)   SpO2 98%   BMI 34.86 kg/m²     Physical Exam  Vascular status is 1/4 DP 2/4 PT negative digital hair normal distal cooling immediate capillary refill bilaterally.  There is slight edema present bilaterally    Derm nails are brittle elongated clear discoloration on both feet.  There is very mild flaky skin present on the plantar aspect of both feet    Neuro light touch was intact and equal bilaterally 0.5 monofilament test was performed on the plantar aspect of the hallux, plantar aspect of the third and fourth toes, submet 3 in the " arch area and all were felt bilaterally and equally by the patient    Ortho hammertoe deformities are present fourth and fifth digits bilaterally  Administrative Statements   I have spent a total time of 15 minutes in caring for this patient on the day of the visit/encounter including Instructions for management, Patient and family education, Counseling / Coordination of care, Documenting in the medical record, Reviewing / ordering tests, medicine, procedures  , and Obtaining or reviewing history  .

## 2024-09-13 ENCOUNTER — APPOINTMENT (OUTPATIENT)
Dept: LAB | Facility: HOSPITAL | Age: 79
End: 2024-09-13
Payer: MEDICARE

## 2024-09-13 LAB
25(OH)D3 SERPL-MCNC: 56 NG/ML (ref 30–100)
ALBUMIN SERPL BCG-MCNC: 4.4 G/DL (ref 3.5–5)
ALP SERPL-CCNC: 55 U/L (ref 34–104)
ALT SERPL W P-5'-P-CCNC: 23 U/L (ref 7–52)
ANION GAP SERPL CALCULATED.3IONS-SCNC: 9 MMOL/L (ref 4–13)
AST SERPL W P-5'-P-CCNC: 22 U/L (ref 13–39)
BASOPHILS # BLD AUTO: 0.05 THOUSANDS/ÂΜL (ref 0–0.1)
BASOPHILS NFR BLD AUTO: 1 % (ref 0–1)
BILIRUB SERPL-MCNC: 0.6 MG/DL (ref 0.2–1)
BUN SERPL-MCNC: 16 MG/DL (ref 5–25)
CALCIUM SERPL-MCNC: 9.5 MG/DL (ref 8.4–10.2)
CHLORIDE SERPL-SCNC: 101 MMOL/L (ref 96–108)
CHOLEST SERPL-MCNC: 90 MG/DL
CO2 SERPL-SCNC: 29 MMOL/L (ref 21–32)
CREAT SERPL-MCNC: 0.82 MG/DL (ref 0.6–1.3)
EOSINOPHIL # BLD AUTO: 0.22 THOUSAND/ÂΜL (ref 0–0.61)
EOSINOPHIL NFR BLD AUTO: 4 % (ref 0–6)
ERYTHROCYTE [DISTWIDTH] IN BLOOD BY AUTOMATED COUNT: 13.3 % (ref 11.6–15.1)
EST. AVERAGE GLUCOSE BLD GHB EST-MCNC: 134 MG/DL
GFR SERPL CREATININE-BSD FRML MDRD: 69 ML/MIN/1.73SQ M
GLUCOSE P FAST SERPL-MCNC: 122 MG/DL (ref 65–99)
HBA1C MFR BLD: 6.3 %
HCT VFR BLD AUTO: 46.1 % (ref 34.8–46.1)
HDLC SERPL-MCNC: 36 MG/DL
HGB BLD-MCNC: 15.4 G/DL (ref 11.5–15.4)
IMM GRANULOCYTES # BLD AUTO: 0.01 THOUSAND/UL (ref 0–0.2)
IMM GRANULOCYTES NFR BLD AUTO: 0 % (ref 0–2)
LDLC SERPL CALC-MCNC: 32 MG/DL (ref 0–100)
LYMPHOCYTES # BLD AUTO: 2.13 THOUSANDS/ÂΜL (ref 0.6–4.47)
LYMPHOCYTES NFR BLD AUTO: 36 % (ref 14–44)
MCH RBC QN AUTO: 31.4 PG (ref 26.8–34.3)
MCHC RBC AUTO-ENTMCNC: 33.4 G/DL (ref 31.4–37.4)
MCV RBC AUTO: 94 FL (ref 82–98)
MONOCYTES # BLD AUTO: 0.45 THOUSAND/ÂΜL (ref 0.17–1.22)
MONOCYTES NFR BLD AUTO: 8 % (ref 4–12)
NEUTROPHILS # BLD AUTO: 3.13 THOUSANDS/ÂΜL (ref 1.85–7.62)
NEUTS SEG NFR BLD AUTO: 51 % (ref 43–75)
NONHDLC SERPL-MCNC: 54 MG/DL
NRBC BLD AUTO-RTO: 0 /100 WBCS
PLATELET # BLD AUTO: 228 THOUSANDS/UL (ref 149–390)
PMV BLD AUTO: 10.2 FL (ref 8.9–12.7)
POTASSIUM SERPL-SCNC: 3.9 MMOL/L (ref 3.5–5.3)
PROT SERPL-MCNC: 7.4 G/DL (ref 6.4–8.4)
RBC # BLD AUTO: 4.91 MILLION/UL (ref 3.81–5.12)
SODIUM SERPL-SCNC: 139 MMOL/L (ref 135–147)
TRIGL SERPL-MCNC: 111 MG/DL
TSH SERPL DL<=0.05 MIU/L-ACNC: 2.33 UIU/ML (ref 0.45–4.5)
URATE SERPL-MCNC: 3.9 MG/DL (ref 2–7.5)
WBC # BLD AUTO: 5.99 THOUSAND/UL (ref 4.31–10.16)

## 2024-09-26 ENCOUNTER — OFFICE VISIT (OUTPATIENT)
Dept: INTERNAL MEDICINE CLINIC | Facility: CLINIC | Age: 79
End: 2024-09-26
Payer: MEDICARE

## 2024-09-26 VITALS
BODY MASS INDEX: 34.87 KG/M2 | HEIGHT: 66 IN | WEIGHT: 217 LBS | TEMPERATURE: 97.2 F | OXYGEN SATURATION: 99 % | DIASTOLIC BLOOD PRESSURE: 76 MMHG | HEART RATE: 67 BPM | SYSTOLIC BLOOD PRESSURE: 124 MMHG

## 2024-09-26 DIAGNOSIS — E55.9 VITAMIN D DEFICIENCY: ICD-10-CM

## 2024-09-26 DIAGNOSIS — E11.9 TYPE 2 DIABETES MELLITUS WITHOUT COMPLICATION, WITHOUT LONG-TERM CURRENT USE OF INSULIN (HCC): ICD-10-CM

## 2024-09-26 DIAGNOSIS — E03.9 ACQUIRED HYPOTHYROIDISM: ICD-10-CM

## 2024-09-26 DIAGNOSIS — E78.2 MIXED HYPERLIPIDEMIA: ICD-10-CM

## 2024-09-26 DIAGNOSIS — E11.22 TYPE 2 DIABETES MELLITUS WITH STAGE 2 CHRONIC KIDNEY DISEASE, WITHOUT LONG-TERM CURRENT USE OF INSULIN  (HCC): ICD-10-CM

## 2024-09-26 DIAGNOSIS — N18.2 TYPE 2 DIABETES MELLITUS WITH STAGE 2 CHRONIC KIDNEY DISEASE, WITHOUT LONG-TERM CURRENT USE OF INSULIN  (HCC): ICD-10-CM

## 2024-09-26 DIAGNOSIS — I10 ESSENTIAL HYPERTENSION: Primary | ICD-10-CM

## 2024-09-26 DIAGNOSIS — M10.9 GOUT, UNSPECIFIED CAUSE, UNSPECIFIED CHRONICITY, UNSPECIFIED SITE: ICD-10-CM

## 2024-09-26 DIAGNOSIS — Z23 ENCOUNTER FOR IMMUNIZATION: ICD-10-CM

## 2024-09-26 PROCEDURE — G0008 ADMIN INFLUENZA VIRUS VAC: HCPCS | Performed by: FAMILY MEDICINE

## 2024-09-26 PROCEDURE — 90662 IIV NO PRSV INCREASED AG IM: CPT | Performed by: FAMILY MEDICINE

## 2024-09-26 PROCEDURE — 99214 OFFICE O/P EST MOD 30 MIN: CPT | Performed by: FAMILY MEDICINE

## 2024-09-26 NOTE — ASSESSMENT & PLAN NOTE
Lab Results   Component Value Date    HGBA1C 6.3 (H) 09/13/2024       Orders:    Comprehensive metabolic panel; Future    Hemoglobin A1C; Future    Albumin / creatinine urine ratio

## 2024-09-26 NOTE — ASSESSMENT & PLAN NOTE
Lab Results   Component Value Date    HGBA1C 6.3 (H) 09/13/2024       Orders:    Comprehensive metabolic panel; Future    Albumin / creatinine urine ratio

## 2024-09-26 NOTE — PROGRESS NOTES
Ambulatory Visit  Name: Enedelia Davis      : 1945      MRN: [id number]  Encounter Provider: Toya Cormier MD  Encounter Date: 2024   Encounter department: [facility]    Assessment & Plan  Essential hypertension    Orders:    CBC and differential; Future    Comprehensive metabolic panel; Future    Type 2 diabetes mellitus with stage 2 chronic kidney disease, without long-term current use of insulin  (HCC)    Lab Results   Component Value Date    HGBA1C 6.3 (H) 2024       Orders:    Comprehensive metabolic panel; Future    Hemoglobin A1C; Future    Albumin / creatinine urine ratio    Type 2 diabetes mellitus without complication, without long-term current use of insulin (HCC)    Lab Results   Component Value Date    HGBA1C 6.3 (H) 2024       Orders:    Comprehensive metabolic panel; Future    Albumin / creatinine urine ratio    Acquired hypothyroidism    Orders:    TSH, 3rd generation; Future    Vitamin D deficiency         Mixed hyperlipidemia    Orders:    Comprehensive metabolic panel; Future    Lipid panel; Future    Gout, unspecified cause, unspecified chronicity, unspecified site    Orders:    Uric acid; Future    Encounter for immunization    Orders:    influenza vaccine, high-dose, PF 0.5 mL (Fluzone High Dose)    Orders recommendations as noted above.  Reviewed recent laboratory testing with her.  Blood work is relatively stable.  Hemoglobin A1c well-controlled at 6.3.  Continue with the Ozempic as previously.  Continue to follow-up with endocrinology.  Continue to follow-up with ophthalmology regularly.  Blood pressure well-controlled.  Continue current medications.  Watch salt intake.  Continue the levothyroxine 75 mcg daily.  Continue to follow TSH and adjust dose if needed.  Continue with vitamin D supplementation.  Continue with the allopurinol.  Continue with the atorvastatin.  Flu shot given today.  Continue with mammograms yearly.  Continue with  bone densities every other year.  Will have her follow-up in about 3 to 6 months or sooner if needed.     History of Present Illness     She presents for routine follow-up.  Has generally been doing relatively well.  She has been having some issues with her ears.  Hears a pulsing sensation in her ears especially when quiet.  She does feel that she might have some wax in her ear so or other issues.  Tolerating her Ozempic without difficulty.  Denies any significant GI symptoms with this.  Continues to follow-up with endocrinology.  Tolerating her blood pressure medications well.  Denies any headaches or localized weakness.  Denies any significant vision changes.  Appetite has been stable.  Tolerating the atorvastatin well.  Denies any significant muscle aches or weakness.  Continues with the allopurinol.  No recent episodes of gout.        History obtained from : patient  Review of Systems   Constitutional:  Negative for activity change, appetite change, chills and fever.   HENT:  Negative for congestion and rhinorrhea.    Eyes:  Negative for visual disturbance.   Respiratory:  Negative for chest tightness and shortness of breath.    Cardiovascular:  Negative for chest pain and palpitations.   Gastrointestinal:  Negative for abdominal pain, blood in stool, diarrhea, nausea and vomiting.   Endocrine: Negative for polydipsia, polyphagia and polyuria.   Genitourinary:  Negative for dysuria, frequency and urgency.   Musculoskeletal:  Positive for arthralgias. Negative for gait problem.   Skin:  Negative for color change.   Neurological:  Negative for dizziness and headaches.   Hematological:  Does not bruise/bleed easily.   Psychiatric/Behavioral:  Negative for confusion and sleep disturbance. The patient is not nervous/anxious.      Medical History Reviewed by provider this encounter:       Past Medical History   Past Medical History:   Diagnosis Date    Allergic rhinitis     Anxiety     Celiac disease      Cerebrovascular disease     Colon polyp     COVID-19     9/11/23    Diabetes mellitus (HCC)     Disease of thyroid gland     Diverticulosis of colon     Dry eye     Gout     Hiatal hernia     Hypertension     Hypothyroidism     Kidney stone     Nephrolithiasis     Perennial allergic rhinitis     Peripheral venous insufficiency     Rosacea conjunctivitis, bilateral     Sleep apnea     Vitamin D deficiency      Past Surgical History:   Procedure Laterality Date    APPENDECTOMY  ?    CATARACT EXTRACTION, BILATERAL  01/2020    CHOLECYSTECTOMY      SD HYSTEROSCOPY BX ENDOMETRIUM&/POLYPC W/WO D&C N/A 12/4/2023    Procedure: DILATATION AND CURETTAGE (D&C) WITH HYSTEROSCOPY WITH POLYPECTOMY;  Surgeon: Maik Bob DO;  Location: [facility];  Service: Gynecology    TONSILLECTOMY      TUBAL LIGATION      VARICOSE VEIN SURGERY       Family History   Problem Relation Age of Onset    Heart disease Mother     Heart disease Father     Diabetes Father     Heart disease Sister     Diabetes Sister     No Known Problems Daughter     No Known Problems Maternal Grandmother     No Known Problems Maternal Grandfather     No Known Problems Paternal Grandmother     No Known Problems Paternal Grandfather     No Known Problems Daughter     No Known Problems Maternal Aunt     No Known Problems Paternal Aunt     No Known Problems Paternal Aunt     No Known Problems Paternal Aunt     No Known Problems Paternal Aunt     Heart disease Brother      Current Outpatient Medications on File Prior to Visit   Medication Sig Dispense Refill    allopurinol (ZYLOPRIM) 100 mg tablet Take 100 mg by mouth 2 (two) times a day      aspirin (ECOTRIN LOW STRENGTH) 81 mg EC tablet Take 81 mg by mouth daily      atorvastatin (LIPITOR) 20 mg tablet TAKE ONE (1) TABLET (20 MG TOTAL) BY MOUTH DAILY 90 tablet 3    Bexagliflozin 20 MG TABS Take 20 mg by mouth in the morning 90 tablet 3    Brimonidine Tartrate (Lumify) 0.025 % SOLN Apply 1 drop to eye 2 (two) times  a day      cholecalciferol (VITAMIN D3) 1,000 units tablet Take 1,000 Units by mouth 2 (two) times a day      citalopram (CeleXA) 10 mg tablet Take 1 tablet (10 mg total) by mouth daily 90 tablet 3    clobetasol (TEMOVATE) 0.05 % cream Apply topically in the morning for 14 days Daily for 1 week and then once weekly 30 g 3    cycloSPORINE (RESTASIS) 0.05 % ophthalmic emulsion Administer 1 drop to both eyes 2 (two) times a day      DOCOSAHEXAENOIC ACID-EPA PO Take by mouth      doxycycline (PERIOSTAT) 20 MG tablet Take 20 mg by mouth 2 (two) times a day      estradiol (ESTRACE) 0.1 mg/g vaginal cream 1 gram vaginally  weekly 42.5 g 3    fexofenadine (ALLEGRA) 180 MG tablet Take 180 mg by mouth daily      GNP Easy Touch Glucose Test test strip TEST BLOOD SUGAR ONCE DIALY. 100 strip 5    levothyroxine 75 mcg tablet Take 1 tablet (75 mcg total) by mouth daily in the early morning 90 tablet 3    lisinopril-hydrochlorothiazide (PRINZIDE,ZESTORETIC) 10-12.5 MG per tablet Take 1 tablet by mouth daily 90 tablet 3    mometasone (ELOCON) 0.1 % cream Apply 1 Application topically once a week      Probiotic Product (PROBIOTIC DAILY PO) Take 1 capsule by mouth in the morning      semaglutide, 0.25 or 0.5 mg/dose, (Ozempic, 0.25 or 0.5 MG/DOSE,) 2 mg/3 mL injection pen Inject 0.75 mL (0.5 mg total) under the skin every 7 days 3 mL 3    SUMAtriptan (IMITREX) 50 mg tablet Take 50 mg by mouth once as needed      Turmeric Curcumin 500 MG CAPS Take 1,000 mg by mouth in the morning      Cholecalciferol (Vitamin D3) 50 MCG (2000 UT) capsule  (Patient not taking: Reported on 9/26/2024)      fluticasone (FLONASE) 50 mcg/act nasal spray 2 sprays into each nostril daily (Patient not taking: Reported on 9/26/2024) 16 g 2    Omega-3 Fatty Acids (FISH OIL) 645 MG CAPS Take by mouth (Patient not taking: Reported on 5/30/2024)       No current facility-administered medications on file prior to visit.     Allergies   Allergen Reactions    Gluten  Meal - Food Allergy Other (See Comments)     Celiac disease    Penicillin G Hives    Wheat Bran - Food Allergy GI Intolerance     Has celiac disease    Diclofenac Rash     Voltaren gel    Diclofenac Sodium Rash    Penicillins Rash and Hives      Current Outpatient Medications on File Prior to Visit   Medication Sig Dispense Refill    allopurinol (ZYLOPRIM) 100 mg tablet Take 100 mg by mouth 2 (two) times a day      aspirin (ECOTRIN LOW STRENGTH) 81 mg EC tablet Take 81 mg by mouth daily      atorvastatin (LIPITOR) 20 mg tablet TAKE ONE (1) TABLET (20 MG TOTAL) BY MOUTH DAILY 90 tablet 3    Bexagliflozin 20 MG TABS Take 20 mg by mouth in the morning 90 tablet 3    Brimonidine Tartrate (Lumify) 0.025 % SOLN Apply 1 drop to eye 2 (two) times a day      cholecalciferol (VITAMIN D3) 1,000 units tablet Take 1,000 Units by mouth 2 (two) times a day      citalopram (CeleXA) 10 mg tablet Take 1 tablet (10 mg total) by mouth daily 90 tablet 3    clobetasol (TEMOVATE) 0.05 % cream Apply topically in the morning for 14 days Daily for 1 week and then once weekly 30 g 3    cycloSPORINE (RESTASIS) 0.05 % ophthalmic emulsion Administer 1 drop to both eyes 2 (two) times a day      DOCOSAHEXAENOIC ACID-EPA PO Take by mouth      doxycycline (PERIOSTAT) 20 MG tablet Take 20 mg by mouth 2 (two) times a day      estradiol (ESTRACE) 0.1 mg/g vaginal cream 1 gram vaginally  weekly 42.5 g 3    fexofenadine (ALLEGRA) 180 MG tablet Take 180 mg by mouth daily      GNP Easy Touch Glucose Test test strip TEST BLOOD SUGAR ONCE DIALY. 100 strip 5    levothyroxine 75 mcg tablet Take 1 tablet (75 mcg total) by mouth daily in the early morning 90 tablet 3    lisinopril-hydrochlorothiazide (PRINZIDE,ZESTORETIC) 10-12.5 MG per tablet Take 1 tablet by mouth daily 90 tablet 3    mometasone (ELOCON) 0.1 % cream Apply 1 Application topically once a week      Probiotic Product (PROBIOTIC DAILY PO) Take 1 capsule by mouth in the morning      semaglutide,  "0.25 or 0.5 mg/dose, (Ozempic, 0.25 or 0.5 MG/DOSE,) 2 mg/3 mL injection pen Inject 0.75 mL (0.5 mg total) under the skin every 7 days 3 mL 3    SUMAtriptan (IMITREX) 50 mg tablet Take 50 mg by mouth once as needed      Turmeric Curcumin 500 MG CAPS Take 1,000 mg by mouth in the morning      Cholecalciferol (Vitamin D3) 50 MCG (2000 UT) capsule  (Patient not taking: Reported on 9/26/2024)      fluticasone (FLONASE) 50 mcg/act nasal spray 2 sprays into each nostril daily (Patient not taking: Reported on 9/26/2024) 16 g 2    Omega-3 Fatty Acids (FISH OIL) 645 MG CAPS Take by mouth (Patient not taking: Reported on 5/30/2024)       No current facility-administered medications on file prior to visit.      Social History     Tobacco Use    Smoking status: Never    Smokeless tobacco: Never   Vaping Use    Vaping status: Never Used   Substance and Sexual Activity    Alcohol use: No     Comment: rarely    Drug use: No    Sexual activity: Yes     Partners: Male     Birth control/protection: Post-menopausal         Objective     /76 (BP Location: Left arm, Patient Position: Sitting)   Pulse 67   Temp (!) 97.2 °F (36.2 °C) (Temporal)   Ht 5' 6\" (1.676 m)   Wt 98.4 kg (217 lb)   SpO2 99%   BMI 35.02 kg/m²     Physical Exam  Vitals and nursing note reviewed.   Constitutional:       General: She is not in acute distress.     Appearance: She is well-developed and well-groomed.   HENT:      Head: Normocephalic and atraumatic.      Comments: Cerumen bilaterally  Eyes:      General:         Right eye: No discharge.         Left eye: No discharge.      Conjunctiva/sclera: Conjunctivae normal.      Pupils: Pupils are equal, round, and reactive to light.   Cardiovascular:      Rate and Rhythm: Normal rate and regular rhythm.      Heart sounds: Normal heart sounds. No murmur heard.     No friction rub. No gallop.   Pulmonary:      Effort: No respiratory distress.      Breath sounds: No wheezing or rales.   Abdominal:      " General: Bowel sounds are normal. There is no distension.      Tenderness: There is no abdominal tenderness.   Lymphadenopathy:      Cervical: No cervical adenopathy.   Skin:     General: Skin is warm and dry.   Neurological:      Mental Status: She is alert and oriented to person, place, and time.   Psychiatric:         Mood and Affect: Mood and affect normal.         Speech: Speech normal.         Behavior: Behavior normal. Behavior is cooperative.         Cognition and Memory: Cognition and memory normal.

## 2024-10-08 ENCOUNTER — CLINICAL SUPPORT (OUTPATIENT)
Dept: INTERNAL MEDICINE CLINIC | Facility: CLINIC | Age: 79
End: 2024-10-08
Payer: MEDICARE

## 2024-10-08 DIAGNOSIS — Z23 NEED FOR COVID-19 VACCINE: Primary | ICD-10-CM

## 2024-10-08 PROCEDURE — 91320 SARSCV2 VAC 30MCG TRS-SUC IM: CPT

## 2024-10-08 PROCEDURE — 90480 ADMN SARSCOV2 VAC 1/ONLY CMP: CPT

## 2024-10-29 ENCOUNTER — HOSPITAL ENCOUNTER (OUTPATIENT)
Dept: MRI IMAGING | Facility: HOSPITAL | Age: 79
Discharge: HOME | End: 2024-10-29
Attending: OTOLARYNGOLOGY
Payer: MEDICARE

## 2024-10-29 DIAGNOSIS — H93.A3 PULSATILE TINNITUS, BILATERAL: ICD-10-CM

## 2024-10-29 DIAGNOSIS — H93.A3 PULSATILE TINNITUS OF BOTH EARS: ICD-10-CM

## 2024-10-29 DIAGNOSIS — R43.9 SMELL DISTURBANCE: ICD-10-CM

## 2024-10-29 DIAGNOSIS — R43.9 DISTURBANCES OF SENSATION OF SMELL AND TASTE: ICD-10-CM

## 2024-10-29 PROCEDURE — 70551 MRI BRAIN STEM W/O DYE: CPT

## 2024-10-29 PROCEDURE — 70547 MR ANGIOGRAPHY NECK W/O DYE: CPT

## 2024-10-29 PROCEDURE — 70544 MR ANGIOGRAPHY HEAD W/O DYE: CPT

## 2024-10-31 ENCOUNTER — TELEPHONE (OUTPATIENT)
Age: 79
End: 2024-10-31

## 2024-10-31 NOTE — TELEPHONE ENCOUNTER
Pt reports she is very concerned about her results of MRI, and wanted PCP to look at them. Pt knows another doctor ordered the MRI. Pt really wants to have Dr. Cormier to look at the MRI-results and call pt back to further discuss.    PCP please call pt back @[phone].

## 2024-11-05 ENCOUNTER — TELEPHONE (OUTPATIENT)
Dept: INTERNAL MEDICINE CLINIC | Facility: CLINIC | Age: 79
End: 2024-11-05

## 2024-11-05 NOTE — TELEPHONE ENCOUNTER
Enedelia called asking if you looked over her MRI.  Stated she was informed she needs to see neurology.  She is asking who you recommend.  Would you rather we bring her in for an appt?

## 2024-11-05 NOTE — TELEPHONE ENCOUNTER
Patient following up on recommendation based of of recent MRI. Patient asked if an apt is needed,     Spoke with Joceline in office. Joceline stated she will speak with Dr Cormier and return a call back to patient with recommendations.     Message relayed to patient. Patient expressed understanding.

## 2024-11-12 ENCOUNTER — OFFICE VISIT (OUTPATIENT)
Dept: CARDIOLOGY CLINIC | Facility: HOSPITAL | Age: 79
End: 2024-11-12
Payer: MEDICARE

## 2024-11-12 VITALS
DIASTOLIC BLOOD PRESSURE: 72 MMHG | SYSTOLIC BLOOD PRESSURE: 146 MMHG | HEIGHT: 66 IN | WEIGHT: 216.6 LBS | HEART RATE: 75 BPM | OXYGEN SATURATION: 97 % | BODY MASS INDEX: 34.81 KG/M2

## 2024-11-12 DIAGNOSIS — I10 ESSENTIAL HYPERTENSION: ICD-10-CM

## 2024-11-12 DIAGNOSIS — E78.2 MIXED HYPERLIPIDEMIA: ICD-10-CM

## 2024-11-12 DIAGNOSIS — I25.10 CORONARY ARTERY CALCIFICATION SEEN ON CT SCAN: Primary | ICD-10-CM

## 2024-11-12 DIAGNOSIS — I67.9 CEREBROVASCULAR DISEASE: ICD-10-CM

## 2024-11-12 DIAGNOSIS — E11.9 TYPE 2 DIABETES MELLITUS WITHOUT COMPLICATION, WITHOUT LONG-TERM CURRENT USE OF INSULIN (HCC): ICD-10-CM

## 2024-11-12 PROCEDURE — 93000 ELECTROCARDIOGRAM COMPLETE: CPT | Performed by: INTERNAL MEDICINE

## 2024-11-12 PROCEDURE — 99214 OFFICE O/P EST MOD 30 MIN: CPT | Performed by: INTERNAL MEDICINE

## 2024-11-12 NOTE — PROGRESS NOTES
Enedelia Davis  1945  [id number]  [facility] [address] [id number] [phone]    1. Coronary artery calcification seen on CT scan  POCT ECG      2. Cerebrovascular disease        3. Essential hypertension        4. Type 2 diabetes mellitus without complication, without long-term current use of insulin ([facility])        5. Mixed hyperlipidemia            Discussion/Summary:  #1 mildly elevated coronary artery calcium score  #2 type 2 diabetes mellitus  #3 hypertension  #4 mixed hyperlipidemia  #5 mild bilateral carotid artery stenosis  #6 obstructive sleep apnea on CPAP    Recommendations: Overall she is doing well, no symptoms and a good functional capacity.  Continue aspirin and statin for elevated calcium score.  Carotid showed no obstructive lesions.  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  Blood pressure at home was well-controlled sugars have been stable.  Lipids have been doing excellent.  No further testing I will see her back in a year        Interval History: 79-year-old female with type 2 diabetes mellitus, hypertension, hyperlipidemia, struct of sleep apnea on CPAP presents for second opinion regarding abnormal coronary calcium score.  Overall her functional capacity is good she denies any symptoms.  There is been no chest pain, shortness of breath, palpitations, lightheadedness, dizziness, or syncope.  Is been a lower extremity edema, PND, orthopnea.  She had a calcium score to try to evaluate her cardiac risk.        Overall she is feeling well remains quite active does an exercise class III days a week.  Denies any exertional chest pain,Pressure, heaviness.  There has been no shortness of breath, palpitations, lightheadedness, dizziness, or syncope.  There has been no lower extremity edema, PND, orthopnea.  She has been taking all medications as prescribed.    Medical Problems        Problem List       Nephrolithiasis    DEEPAK on CPAP    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    Varicose veins of both lower extremities with complications    Renal cyst    Proteinuria    Prediabetes    Morbid (severe) obesity due to excess calories (HCC)    Celiac disease    Cerebrovascular disease    Diverticulosis of colon    Essential hypertension    Gout    Hiatal hernia    Hypothyroidism    Polyp of colon    Type 2 diabetes mellitus (HCC)      Lab Results   Component Value Date    HGBA1C 6.3 (H) 09/13/2024         Vitamin D deficiency    Type 2 diabetes mellitus with stage 2 chronic kidney disease, without long-term current use of insulin (HCC)      Lab Results   Component Value Date    HGBA1C 6.3 (H) 09/13/2024         Stage 3a chronic kidney disease (HCC)    Lab Results   Component Value Date    EGFR 69 09/13/2024    EGFR 77 04/17/2024    EGFR 72 10/24/2023    CREATININE 0.82 09/13/2024    CREATININE 0.75 04/17/2024    CREATININE 0.79 10/24/2023         Coronary artery calcification seen on CT scan    Mixed hyperlipidemia        Past Medical History:   Diagnosis Date    Allergic rhinitis     Anxiety     Celiac disease     Cerebrovascular disease     Colon polyp     COVID-19     9/11/23    Diabetes mellitus (HCC)     Disease of thyroid gland     Diverticulosis of colon     Dry eye     Gout     Hiatal hernia     Hypertension     Hypothyroidism     Kidney stone     Nephrolithiasis     Perennial allergic rhinitis     Peripheral venous insufficiency     Rosacea conjunctivitis, bilateral     Sleep apnea     Vitamin D deficiency      Social History     Socioeconomic History    Marital status: /Civil Union     Spouse name: Not on file    Number of children: Not on file    Years of education: Not on file    Highest education level: Not on file   Occupational History    Not on file   Tobacco Use    Smoking status: Never    Smokeless tobacco: Never   Vaping Use    Vaping status: Never Used    Substance and Sexual Activity    Alcohol use: No     Comment: rarely    Drug use: No    Sexual activity: Yes     Partners: Male     Birth control/protection: Post-menopausal   Other Topics Concern    Not on file   Social History Narrative    Not on file     Social Determinants of Health     Financial Resource Strain: Not on file   Food Insecurity: No Food Insecurity (4/25/2024)    Nursing - Inadequate Food Risk Classification     Worried About Running Out of Food in the Last Year: Never true     Ran Out of Food in the Last Year: Never true     Ran Out of Food in the Last Year: Not on file   Transportation Needs: No Transportation Needs (4/25/2024)    PRAPARE - Transportation     Lack of Transportation (Medical): No     Lack of Transportation (Non-Medical): No   Physical Activity: Not on file   Stress: Not on file   Social Connections: Unknown (6/18/2024)    Received from [facility]     How often do you feel lonely or isolated from those around you? (Adult - for ages 18 years and over): Not on file   Intimate Partner Violence: Not on file   Housing Stability: Low Risk  (4/25/2024)    Housing Stability Vital Sign     Unable to Pay for Housing in the Last Year: No     Number of Times Moved in the Last Year: 1     Homeless in the Last Year: No      Family History   Problem Relation Age of Onset    Heart disease Mother     Heart disease Father     Diabetes Father     Heart disease Sister     Diabetes Sister     No Known Problems Daughter     No Known Problems Maternal Grandmother     No Known Problems Maternal Grandfather     No Known Problems Paternal Grandmother     No Known Problems Paternal Grandfather     No Known Problems Daughter     No Known Problems Maternal Aunt     No Known Problems Paternal Aunt     No Known Problems Paternal Aunt     No Known Problems Paternal Aunt     No Known Problems Paternal Aunt     Heart disease Brother      Past Surgical History:   Procedure Laterality Date     APPENDECTOMY  ?    CATARACT EXTRACTION, BILATERAL  01/2020    CHOLECYSTECTOMY      MD HYSTEROSCOPY BX ENDOMETRIUM&/POLYPC W/WO D&C N/A 12/4/2023    Procedure: DILATATION AND CURETTAGE (D&C) WITH HYSTEROSCOPY WITH POLYPECTOMY;  Surgeon: Maik Bob DO;  Location: [facility];  Service: Gynecology    TONSILLECTOMY      TUBAL LIGATION      VARICOSE VEIN SURGERY         Current Outpatient Medications:     allopurinol (ZYLOPRIM) 100 mg tablet, Take 100 mg by mouth 2 (two) times a day, Disp: , Rfl:     aspirin (ECOTRIN LOW STRENGTH) 81 mg EC tablet, Take 81 mg by mouth daily, Disp: , Rfl:     atorvastatin (LIPITOR) 20 mg tablet, TAKE ONE (1) TABLET (20 MG TOTAL) BY MOUTH DAILY, Disp: 90 tablet, Rfl: 3    Bexagliflozin 20 MG TABS, Take 20 mg by mouth in the morning, Disp: 90 tablet, Rfl: 3    Brimonidine Tartrate (Lumify) 0.025 % SOLN, Apply 1 drop to eye 2 (two) times a day, Disp: , Rfl:     cholecalciferol (VITAMIN D3) 1,000 units tablet, Take 1,000 Units by mouth 2 (two) times a day, Disp: , Rfl:     citalopram (CeleXA) 10 mg tablet, Take 1 tablet (10 mg total) by mouth daily, Disp: 90 tablet, Rfl: 3    clobetasol (TEMOVATE) 0.05 % cream, Apply topically in the morning for 14 days Daily for 1 week and then once weekly, Disp: 30 g, Rfl: 3    cycloSPORINE (RESTASIS) 0.05 % ophthalmic emulsion, Administer 1 drop to both eyes 2 (two) times a day, Disp: , Rfl:     DOCOSAHEXAENOIC ACID-EPA PO, Take by mouth, Disp: , Rfl:     doxycycline (PERIOSTAT) 20 MG tablet, Take 20 mg by mouth 2 (two) times a day, Disp: , Rfl:     estradiol (ESTRACE) 0.1 mg/g vaginal cream, 1 gram vaginally  weekly, Disp: 42.5 g, Rfl: 3    fexofenadine (ALLEGRA) 180 MG tablet, Take 180 mg by mouth daily, Disp: , Rfl:     GNP Easy Touch Glucose Test test strip, TEST BLOOD SUGAR ONCE DIALY., Disp: 100 strip, Rfl: 5    levothyroxine 75 mcg tablet, Take 1 tablet (75 mcg total) by mouth daily in the early morning, Disp: 90 tablet, Rfl: 3     "lisinopril-hydrochlorothiazide (PRINZIDE,ZESTORETIC) 10-12.5 MG per tablet, Take 1 tablet by mouth daily, Disp: 90 tablet, Rfl: 3    mometasone (ELOCON) 0.1 % cream, Apply 1 Application topically once a week, Disp: , Rfl:     Probiotic Product (PROBIOTIC DAILY PO), Take 1 capsule by mouth in the morning, Disp: , Rfl:     SUMAtriptan (IMITREX) 50 mg tablet, Take 50 mg by mouth once as needed, Disp: , Rfl:     Turmeric Curcumin 500 MG CAPS, Take 1,000 mg by mouth in the morning, Disp: , Rfl:     Cholecalciferol (Vitamin D3) 50 MCG (2000 UT) capsule, , Disp: , Rfl:     fluticasone (FLONASE) 50 mcg/act nasal spray, 2 sprays into each nostril daily (Patient not taking: Reported on 9/26/2024), Disp: 16 g, Rfl: 2    Omega-3 Fatty Acids (FISH OIL) 645 MG CAPS, Take by mouth (Patient not taking: Reported on 5/30/2024), Disp: , Rfl:     semaglutide, 0.25 or 0.5 mg/dose, (Ozempic, 0.25 or 0.5 MG/DOSE,) 2 mg/3 mL injection pen, Inject 0.75 mL (0.5 mg total) under the skin every 7 days (Patient not taking: Reported on 10/21/2024), Disp: 3 mL, Rfl: 3  Allergies   Allergen Reactions    Gluten Meal - Food Allergy Other (See Comments)     Celiac disease    Penicillin G Hives    Wheat Bran - Food Allergy GI Intolerance     Has celiac disease    Diclofenac Rash     Voltaren gel    Diclofenac Sodium Rash    Penicillins Rash and Hives       Labs:     Chemistry        Component Value Date/Time    K 3.9 09/13/2024 0752     09/13/2024 0752    CO2 29 09/13/2024 0752    BUN 16 09/13/2024 0752    CREATININE 0.82 09/13/2024 0752        Component Value Date/Time    CALCIUM 9.5 09/13/2024 0752    ALKPHOS 55 09/13/2024 0752    AST 22 09/13/2024 0752    ALT 23 09/13/2024 0752            No results found for: \"CHOL\"  Lab Results   Component Value Date    HDL 36 (L) 09/13/2024    HDL 36 (L) 03/20/2024    HDL 39 (L) 11/20/2023     Lab Results   Component Value Date    LDLCALC 32 09/13/2024    LDLCALC 35 03/20/2024    LDLCALC 40 11/20/2023 " "    Lab Results   Component Value Date    TRIG 111 09/13/2024    TRIG 117 03/20/2024    TRIG 86 11/20/2023     No results found for: \"CHOLHDL\"    Imaging: No results found.    ECG:        Review of Systems   Constitutional: Negative.   HENT: Negative.     Eyes: Negative.    Cardiovascular: Negative.    Respiratory: Negative.     Endocrine: Negative.    Hematologic/Lymphatic: Negative.    Skin: Negative.    Musculoskeletal: Negative.    Gastrointestinal: Negative.    Genitourinary: Negative.    Neurological: Negative.    Psychiatric/Behavioral: Negative.     All other systems reviewed and are negative.      Vitals:    11/12/24 0908   BP: 146/72   Pulse: 75   SpO2: 97%     Vitals:    11/12/24 0908   Weight: 98.2 kg (216 lb 9.6 oz)     Height: 5' 6\" (167.6 cm)   Body mass index is 34.96 kg/m².    Physical Exam:  Vital signs reviewed  General:  Alert and cooperative, appears stated age, no acute distress  HEENT:  PERRLA, EOMI, no scleral icterus, no conjunctival pallor  Neck:  No lymphadenopathy, no thyromegaly, no carotid bruits, no elevated JVP  Heart:  Regular rate and rhythm, normal S1/S2, no S3/S4, no murmur, rubs or gallops.  PMI nondisplaced  Lungs:  Clear to auscultation bilaterally, no wheezes rales or rhonchi  Abdomen:  Soft, non-tender, positive bowel sounds, no rebound or guarding,   no organomegaly   Extremities:  Normal range of motion.  No clubbing, cyanosis or edema   Vascular:  2+ pedal pulses  Skin:  No rashes or lesions on exposed skin  Neurologic:  Cranial nerves II-XII grossly intact without focal deficits  Psych:  Normal mood and affect      "

## 2024-11-13 ENCOUNTER — APPOINTMENT (OUTPATIENT)
Dept: LAB | Facility: HOSPITAL | Age: 79
End: 2024-11-13
Payer: MEDICARE

## 2024-11-13 ENCOUNTER — RESULTS FOLLOW-UP (OUTPATIENT)
Dept: ENDOCRINOLOGY | Facility: CLINIC | Age: 79
End: 2024-11-13

## 2024-11-13 DIAGNOSIS — N18.2 TYPE 2 DIABETES MELLITUS WITH STAGE 2 CHRONIC KIDNEY DISEASE, WITHOUT LONG-TERM CURRENT USE OF INSULIN  (HCC): ICD-10-CM

## 2024-11-13 DIAGNOSIS — E11.22 TYPE 2 DIABETES MELLITUS WITH STAGE 2 CHRONIC KIDNEY DISEASE, WITHOUT LONG-TERM CURRENT USE OF INSULIN  (HCC): ICD-10-CM

## 2024-11-13 LAB
ALBUMIN SERPL BCG-MCNC: 4.5 G/DL (ref 3.5–5)
ALP SERPL-CCNC: 59 U/L (ref 34–104)
ALT SERPL W P-5'-P-CCNC: 22 U/L (ref 7–52)
ANION GAP SERPL CALCULATED.3IONS-SCNC: 8 MMOL/L (ref 4–13)
AST SERPL W P-5'-P-CCNC: 19 U/L (ref 13–39)
BILIRUB SERPL-MCNC: 0.65 MG/DL (ref 0.2–1)
BUN SERPL-MCNC: 17 MG/DL (ref 5–25)
CALCIUM SERPL-MCNC: 9.2 MG/DL (ref 8.4–10.2)
CHLORIDE SERPL-SCNC: 101 MMOL/L (ref 96–108)
CO2 SERPL-SCNC: 29 MMOL/L (ref 21–32)
CREAT SERPL-MCNC: 0.86 MG/DL (ref 0.6–1.3)
EST. AVERAGE GLUCOSE BLD GHB EST-MCNC: 137 MG/DL
GFR SERPL CREATININE-BSD FRML MDRD: 65 ML/MIN/1.73SQ M
GLUCOSE P FAST SERPL-MCNC: 134 MG/DL (ref 65–99)
HBA1C MFR BLD: 6.4 %
INSULIN SERPL-ACNC: 40.04 UIU/ML (ref 1.9–23)
POTASSIUM SERPL-SCNC: 3.9 MMOL/L (ref 3.5–5.3)
PROT SERPL-MCNC: 7.3 G/DL (ref 6.4–8.4)
SODIUM SERPL-SCNC: 138 MMOL/L (ref 135–147)

## 2024-11-13 PROCEDURE — 36415 COLL VENOUS BLD VENIPUNCTURE: CPT

## 2024-11-13 PROCEDURE — 83036 HEMOGLOBIN GLYCOSYLATED A1C: CPT

## 2024-11-13 PROCEDURE — 83525 ASSAY OF INSULIN: CPT

## 2024-11-13 PROCEDURE — 80053 COMPREHEN METABOLIC PANEL: CPT

## 2024-11-14 ENCOUNTER — OFFICE VISIT (OUTPATIENT)
Dept: ENDOCRINOLOGY | Facility: CLINIC | Age: 79
End: 2024-11-14
Payer: MEDICARE

## 2024-11-14 VITALS
SYSTOLIC BLOOD PRESSURE: 122 MMHG | TEMPERATURE: 98.3 F | WEIGHT: 216 LBS | OXYGEN SATURATION: 99 % | HEART RATE: 67 BPM | DIASTOLIC BLOOD PRESSURE: 64 MMHG | HEIGHT: 66 IN | BODY MASS INDEX: 34.72 KG/M2

## 2024-11-14 DIAGNOSIS — N18.31 STAGE 3A CHRONIC KIDNEY DISEASE (HCC): ICD-10-CM

## 2024-11-14 DIAGNOSIS — E88.819 INSULIN RESISTANCE: ICD-10-CM

## 2024-11-14 DIAGNOSIS — E11.22 TYPE 2 DIABETES MELLITUS WITH STAGE 2 CHRONIC KIDNEY DISEASE, WITHOUT LONG-TERM CURRENT USE OF INSULIN  (HCC): Primary | ICD-10-CM

## 2024-11-14 DIAGNOSIS — N18.2 TYPE 2 DIABETES MELLITUS WITH STAGE 2 CHRONIC KIDNEY DISEASE, WITHOUT LONG-TERM CURRENT USE OF INSULIN  (HCC): Primary | ICD-10-CM

## 2024-11-14 DIAGNOSIS — E78.2 MIXED HYPERLIPIDEMIA: ICD-10-CM

## 2024-11-14 DIAGNOSIS — I10 ESSENTIAL HYPERTENSION: ICD-10-CM

## 2024-11-14 PROCEDURE — G2211 COMPLEX E/M VISIT ADD ON: HCPCS | Performed by: STUDENT IN AN ORGANIZED HEALTH CARE EDUCATION/TRAINING PROGRAM

## 2024-11-14 PROCEDURE — 99214 OFFICE O/P EST MOD 30 MIN: CPT | Performed by: STUDENT IN AN ORGANIZED HEALTH CARE EDUCATION/TRAINING PROGRAM

## 2024-11-14 NOTE — PROGRESS NOTES
Name: Enedelia Davis      : 1945      MRN: [id number]  Encounter Provider: Juancho Alejo DO  Encounter Date: 2024   Encounter department: [facility] [place]  :  Assessment & Plan  Type 2 diabetes mellitus with stage 2 chronic kidney disease, without long-term current use of insulin  (HCC)  Stable control. C/w brenzavvy. She stopped ozempic due to costs. We discussed nutritional planning today. Will follow up in 6-mo  Lab Results   Component Value Date    HGBA1C 6.4 (H) 2024       Orders:    Comprehensive metabolic panel; Future    Lipid Panel with Direct LDL reflex; Future    Hemoglobin A1C; Future    Albumin / creatinine urine ratio; Future    Stage 3a chronic kidney disease (HCC)  Lab Results   Component Value Date    EGFR 65 2024    EGFR 69 2024    EGFR 77 2024    CREATININE 0.86 2024    CREATININE 0.82 2024    CREATININE 0.75 2024            Essential hypertension  Good control       Mixed hyperlipidemia  C/w statin       Insulin resistance  Very high insulin levels.        RTC 6-mo    History of Present Illness   HPI  Enedelia Davis is a 79 y.o. female who presents in follow up of type 2 diabetes.      She is seeing neurovascular surgery due to findings on MRI. This was pursued due to pulsatile tinnitus.     She is no longer taking ozempic due to costs and perceived lack of efficacy. She has had stable weight.      She has prior intolerance to metformin causing GI side effects.      She is on brenzavvy 20 mg daily. She is testing CBG daily and values 90 - 120 daily pre- and post-prandially. She mentions consumption of ice cream and cookies. She notes history of celiacs. She mentions a post-prandial value of 190 after ice cream consumption.     For hyperlipidemia she takes lipitor 20 mg daily. She is on ACE-thiazide combination for hypertension.       Review of Systems       Objective   /64 (BP Location: Right  "arm, Patient Position: Sitting)   Pulse 67   Temp 98.3 °F (36.8 °C)   Ht 5' 6\" (1.676 m)   Wt 98 kg (216 lb)   SpO2 99%   BMI 34.86 kg/m²      Physical Exam      "

## 2024-11-14 NOTE — ASSESSMENT & PLAN NOTE
Stable control. C/w brenzavvy. She stopped ozempic due to costs. We discussed nutritional planning today. Will follow up in 6-mo  Lab Results   Component Value Date    HGBA1C 6.4 (H) 11/13/2024       Orders:    Comprehensive metabolic panel; Future    Lipid Panel with Direct LDL reflex; Future    Hemoglobin A1C; Future    Albumin / creatinine urine ratio; Future

## 2024-11-20 ENCOUNTER — OFFICE VISIT (OUTPATIENT)
Dept: PODIATRY | Facility: CLINIC | Age: 79
End: 2024-11-20
Payer: MEDICARE

## 2024-11-20 VITALS
TEMPERATURE: 97.2 F | DIASTOLIC BLOOD PRESSURE: 74 MMHG | BODY MASS INDEX: 34.91 KG/M2 | HEART RATE: 72 BPM | HEIGHT: 66 IN | RESPIRATION RATE: 16 BRPM | WEIGHT: 217.2 LBS | OXYGEN SATURATION: 97 % | SYSTOLIC BLOOD PRESSURE: 124 MMHG

## 2024-11-20 DIAGNOSIS — E11.22 TYPE 2 DIABETES MELLITUS WITH STAGE 2 CHRONIC KIDNEY DISEASE, WITHOUT LONG-TERM CURRENT USE OF INSULIN  (HCC): ICD-10-CM

## 2024-11-20 DIAGNOSIS — M79.674 PAIN IN TOES OF BOTH FEET: ICD-10-CM

## 2024-11-20 DIAGNOSIS — L60.3 DYSTROPHIC NAIL: Primary | ICD-10-CM

## 2024-11-20 DIAGNOSIS — N18.2 TYPE 2 DIABETES MELLITUS WITH STAGE 2 CHRONIC KIDNEY DISEASE, WITHOUT LONG-TERM CURRENT USE OF INSULIN  (HCC): ICD-10-CM

## 2024-11-20 DIAGNOSIS — M79.675 PAIN IN TOES OF BOTH FEET: ICD-10-CM

## 2024-11-20 DIAGNOSIS — L85.3 XEROSIS OF SKIN: ICD-10-CM

## 2024-11-20 PROCEDURE — 11721 DEBRIDE NAIL 6 OR MORE: CPT | Performed by: PODIATRIST

## 2024-11-20 PROCEDURE — RECHECK: Performed by: PODIATRIST

## 2024-11-20 NOTE — PROGRESS NOTES
Name: Enedelia Davis      : 1945      MRN: [id number]  Encounter Provider: Toya Lockwood DPM  Encounter Date: 2024   Encounter department: [facility] [place]  :  Assessment & Plan  Dystrophic nail         Xerosis of skin         Pain in toes of both feet         Type 2 diabetes mellitus with stage 2 chronic kidney disease, without long-term current use of insulin  ([facility])    Lab Results   Component Value Date    HGBA1C 6.4 (H) 2024                 IMPRESSION:  DM, A1c 6.4 2024  Nail dystrophy  Calluses  Painful nails B/L    PLAN:  I reviewed clinical exam with patient in detail today. I have discussed with the patient the pathophysiology of this diagnosis and reviewed how the examination correlates with this diagnosis.  I reviewed PCP note from 2024  Patient has significant lower extremity risk due to diminished pulses in the feet and trophic skin changes to the lower extremity including thick toenail, atrophic skin, and decreased hair growth.  Debridement of toenails x10. Using nail nipper, buffy, and curette, nails were sharply debrided, reduced in thickness and length. Devitalized nail tissue and fungal debris excised and removed. Patient tolerated well.    Bilateral calluses debrided to healthy tissue using electric bur submetatarsal 1, submetatarsal 5  Recommended keratolytic moisturizers including AmLactin for daily use  Discussed proper shoe gear, daily inspections of feet, and general foot health with patient.   Patient has Q8  findings and is recommended for at risk foot care every 9-10 weeks.      History of Present Illness     HPI  Enedelia Davis is a 79 y.o. female who presents for bilateral at risk foot evaluation and painful elongated dystrophic nails and painful calluses.  Patient denies significant changes since last exam.          Review of Systems   Constitutional:  Negative for chills and fever.   Respiratory:  Negative for cough and shortness of breath.   "  Cardiovascular:  Positive for leg swelling.   Musculoskeletal:  Positive for arthralgias.   Skin:  Positive for color change.   Neurological: Negative.           Objective   /74 (Patient Position: Sitting, Cuff Size: Large)   Pulse 72   Temp (!) 97.2 °F (36.2 °C) (Temporal)   Resp 16   Ht 5' 6\" (1.676 m)   Wt 98.5 kg (217 lb 3.2 oz)   SpO2 97%   BMI 35.06 kg/m²      Physical Exam  Cardiovascular:      Comments: DP 1/4 BL  PT 0/4 BL  CRT less than 3 seconds  Diminished pedal hair growth  Skin temperature diminished from knees to digits bilaterally  Mild to moderate lower extremity edema left worse than right  Skin:     General: Skin is dry.      Capillary Refill: Capillary refill takes less than 2 seconds.      Findings: Lesion present.      Comments: Hyperkeratotic lesion submet 1 and submet 5 bilaterally  Elongated thickened discolored toenails bilaterally   Neurological:      Comments: Sensation intact           "

## 2024-11-25 DIAGNOSIS — F34.1 PERSISTENT DEPRESSIVE DISORDER: ICD-10-CM

## 2024-11-25 DIAGNOSIS — E11.22 TYPE 2 DIABETES MELLITUS WITH STAGE 2 CHRONIC KIDNEY DISEASE, WITHOUT LONG-TERM CURRENT USE OF INSULIN  (HCC): ICD-10-CM

## 2024-11-25 DIAGNOSIS — N18.2 TYPE 2 DIABETES MELLITUS WITH STAGE 2 CHRONIC KIDNEY DISEASE, WITHOUT LONG-TERM CURRENT USE OF INSULIN  (HCC): ICD-10-CM

## 2024-11-25 NOTE — TELEPHONE ENCOUNTER
Prescribed by old provider    Reason for call:   [x] Refill   [] Prior Auth  [] Other:     Office:   [x] PCP/Provider - Toya Cormier MD   [] Specialty/Provider -     Medication: citalopram (CeleXA) 10 mg tablet     Dose/Frequency: Take 1 tablet (10 mg total) by mouth daily,     Quantity:  90 tablet     Pharmacy: [facility] - [facility] - [address]     Does the patient have enough for 3 days?   [x] Yes   [] No - Send as HP to POD

## 2024-11-25 NOTE — TELEPHONE ENCOUNTER
Reason for call:   [x] Refill   [] Prior Auth  [] Other:     Office:   [] PCP/Provider -   [x] Specialty/Provider - [facility]  Authorized By: Juancho Alejo,     Medication:     Bexagliflozin 20 MG TABS 20 mg, Daily         Pharmacy: [facility] [place], [place] - [facility]      Does the patient have enough for 3 days?   [x] Yes   [] No - Send as HP to POD

## 2024-11-26 RX ORDER — CITALOPRAM HYDROBROMIDE 10 MG/1
10 TABLET ORAL DAILY
Qty: 90 TABLET | Refills: 1 | Status: SHIPPED | OUTPATIENT
Start: 2024-11-26

## 2024-11-27 RX ORDER — BEXAGLIFLOZIN 20 MG
20 TABLET ORAL DAILY
Qty: 90 TABLET | Refills: 1 | Status: SHIPPED | OUTPATIENT
Start: 2024-11-27

## 2024-11-29 NOTE — TELEPHONE ENCOUNTER
Patient called requesting refill for Bexagliflozin 20 MG TABS . Patient made aware medication was refilled on 11/27/24 for 90 with 0 refills to [facility]  pharmacy. Patient instructed to contact the pharmacy to obtain refills of medication. Patient verbalized understanding.

## 2024-12-04 ENCOUNTER — CONSULT (OUTPATIENT)
Dept: NEUROSURGERY | Facility: CLINIC | Age: 79
End: 2024-12-04
Payer: MEDICARE

## 2024-12-04 VITALS
HEART RATE: 73 BPM | WEIGHT: 217 LBS | DIASTOLIC BLOOD PRESSURE: 76 MMHG | TEMPERATURE: 98.1 F | SYSTOLIC BLOOD PRESSURE: 138 MMHG | HEIGHT: 66 IN | OXYGEN SATURATION: 97 % | RESPIRATION RATE: 13 BRPM | BODY MASS INDEX: 34.87 KG/M2

## 2024-12-04 DIAGNOSIS — Z79.01 LONG TERM (CURRENT) USE OF ANTICOAGULANTS: ICD-10-CM

## 2024-12-04 DIAGNOSIS — I67.1 CEREBROVASCULAR DURAL AV FISTULA: Primary | ICD-10-CM

## 2024-12-04 DIAGNOSIS — Z01.812 PRE-OPERATIVE LABORATORY EXAMINATION: ICD-10-CM

## 2024-12-04 DIAGNOSIS — R90.89 ABNORMAL CENTRAL NERVOUS SYSTEM DIAGNOSTIC IMAGING: ICD-10-CM

## 2024-12-04 DIAGNOSIS — H93.A1 PULSATILE TINNITUS OF RIGHT EAR: ICD-10-CM

## 2024-12-04 PROCEDURE — 99205 OFFICE O/P NEW HI 60 MIN: CPT | Performed by: NEUROLOGICAL SURGERY

## 2024-12-04 RX ORDER — SODIUM CHLORIDE 9 MG/ML
75 INJECTION, SOLUTION INTRAVENOUS CONTINUOUS
OUTPATIENT
Start: 2024-12-04

## 2024-12-04 NOTE — PROGRESS NOTES
Name: Enedelia Davis      : 1945      MRN: [id number]  Encounter Provider: Greg Lopez MD  Encounter Date: 2024   [facility]  :  Assessment & Plan  Cerebrovascular dural AV fistula  MRA with potential dural AV fistula of the paraertebral venous system and hypoglossal canal/jugular foramen.  We reviewed her imaging in detail as well as the pathophysiology, natural history and diagnosis of dural AV fistulas.  We discussed healthy can potentially cause pulsatile tinnitus as well as intracranial hemorrhage should the venous pressure become too high with retrograde flow.  While her initial presentation was pulsatile tinnitus this appears to have largely resolved after a recent surgery.    After reviewing her images we discussed definitive diagnosis with formal arteriography especially with the changing symptoms and early venous filling on her MRI.  She understands formal arteriography and its risks including bleeding, vascular injury and stroke.  She has signed consent to this and we will plan on proceeding in the near future.Orders:    IR cerebral angiography; Future    Pulsatile tinnitus of right ear  See above.  This is only been bothersome in the past when it is quiet.  It has not kept her awake or bothered her sleep.  Furthermore most recently it has resolved.    Previously had pulsatile tinnitus of left ear which resolved.  Orders:    IR cerebral angiography; Future    Abnormal central nervous system diagnostic imaging  See above.  Orders:    Ambulatory referral to Neurosurgery    IR cerebral angiography; Future        History of Present Illness   Pleasant 79-year-old female who presented to ENT for pulsatile tinnitus/hearing her pulse in her right ear.  Several years ago she did have a similar problem in her left ear which spontaneously resolved.  As part as her ENT evaluation an MRA of the head was obtained which was concerning for possible  dural AV fistula and arterialized venous flow in her right hypoglossal canal and jugular foramen.    Interestingly, she states that her pulsatile tinnitus was most bothersome in quiet environments when she was sitting quietly or watching TV but never interfered with her activities of daily living or sleeping.  She had a benign cyst of her neck resected recently and since then has not had any notice of her pulsatile tinnitus.  She denies any activities or worsening factors that can trigger these symptoms.    Her past medical history is for hypertension, diabetes, celiac disease and kidney stones.  She has had a hysterectomy, cholecystectomy, and appendectomy in the past.    She is allergic to penicillin and Voltaren.    She is .  She has 3 children.  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  No tobacco or illicit drug use.  No alcohol use.    No family history of brain aneurysms or intracranial hemorrhage.  She does have family history of sudden death in her mother, she believes that this may be secondary to cardiac disease.    Review of Systems   Constitutional:  Negative for fatigue.   HENT:  Negative for tinnitus.    Eyes:  Negative for visual disturbance.   Gastrointestinal: Negative.    Genitourinary: Negative.    Musculoskeletal:  Negative for gait problem.   Neurological:  Negative for dizziness, tremors, seizures, syncope, speech difficulty, weakness, numbness and headaches.   Hematological:  Bruises/bleeds easily (asa81).   Psychiatric/Behavioral:  Negative for confusion, decreased concentration and sleep disturbance.       I have personally reviewed the MA's review of systems and made changes as necessary.    Past Medical History   Past Medical History:   Diagnosis Date    Allergic rhinitis     Anxiety     Celiac disease     Cerebrovascular disease     Colon polyp     COVID-19     9/11/23    Diabetes mellitus (HCC)     Disease of thyroid gland     Diverticulosis of colon     Dry eye      Gout     Hiatal hernia     Hypertension     Hypothyroidism     Kidney stone     Nephrolithiasis     Perennial allergic rhinitis     Peripheral venous insufficiency     Rosacea conjunctivitis, bilateral     Sleep apnea     Vitamin D deficiency      Past Surgical History:   Procedure Laterality Date    APPENDECTOMY  ?    CATARACT EXTRACTION, BILATERAL  01/2020    CHOLECYSTECTOMY      NH HYSTEROSCOPY BX ENDOMETRIUM&/POLYPC W/WO D&C N/A 12/4/2023    Procedure: DILATATION AND CURETTAGE (D&C) WITH HYSTEROSCOPY WITH POLYPECTOMY;  Surgeon: Maik Bob DO;  Location: [facility];  Service: Gynecology    TONSILLECTOMY      TUBAL LIGATION      VARICOSE VEIN SURGERY       Family History   Problem Relation Age of Onset    Heart disease Mother     Heart disease Father     Diabetes Father     Heart disease Sister     Diabetes Sister     No Known Problems Daughter     No Known Problems Maternal Grandmother     No Known Problems Maternal Grandfather     No Known Problems Paternal Grandmother     No Known Problems Paternal Grandfather     No Known Problems Daughter     No Known Problems Maternal Aunt     No Known Problems Paternal Aunt     No Known Problems Paternal Aunt     No Known Problems Paternal Aunt     No Known Problems Paternal Aunt     Heart disease Brother       reports that she has never smoked. She has never used smokeless tobacco. She reports that she does not drink alcohol and does not use drugs.  Current Outpatient Medications on File Prior to Visit   Medication Sig Dispense Refill    allopurinol (ZYLOPRIM) 100 mg tablet Take 100 mg by mouth 2 (two) times a day      aspirin (ECOTRIN LOW STRENGTH) 81 mg EC tablet Take 81 mg by mouth daily      atorvastatin (LIPITOR) 20 mg tablet TAKE ONE (1) TABLET (20 MG TOTAL) BY MOUTH DAILY 90 tablet 3    Bexagliflozin 20 MG TABS Take 20 mg by mouth in the morning 90 tablet 1    Brimonidine Tartrate (Lumify) 0.025 % SOLN Apply 1 drop to eye 2 (two) times a day       "cholecalciferol (VITAMIN D3) 1,000 units tablet Take 1,000 Units by mouth 2 (two) times a day      citalopram (CeleXA) 10 mg tablet Take 1 tablet (10 mg total) by mouth daily 90 tablet 1    clobetasol (TEMOVATE) 0.05 % cream Apply topically in the morning for 14 days Daily for 1 week and then once weekly 30 g 3    cycloSPORINE (RESTASIS) 0.05 % ophthalmic emulsion Administer 1 drop to both eyes 2 (two) times a day      DOCOSAHEXAENOIC ACID-EPA PO Take by mouth      doxycycline (PERIOSTAT) 20 MG tablet Take 20 mg by mouth 2 (two) times a day      estradiol (ESTRACE) 0.1 mg/g vaginal cream 1 gram vaginally  weekly 42.5 g 3    fexofenadine (ALLEGRA) 180 MG tablet Take 180 mg by mouth daily      GNP Easy Touch Glucose Test test strip TEST BLOOD SUGAR ONCE DIALY. 100 strip 5    levothyroxine 75 mcg tablet Take 1 tablet (75 mcg total) by mouth daily in the early morning 90 tablet 3    lisinopril-hydrochlorothiazide (PRINZIDE,ZESTORETIC) 10-12.5 MG per tablet Take 1 tablet by mouth daily 90 tablet 3    mometasone (ELOCON) 0.1 % cream Apply 1 Application topically once a week      Probiotic Product (PROBIOTIC DAILY PO) Take 1 capsule by mouth in the morning      SUMAtriptan (IMITREX) 50 mg tablet Take 50 mg by mouth once as needed      Turmeric Curcumin 500 MG CAPS Take 1,000 mg by mouth in the morning       No current facility-administered medications on file prior to visit.     Allergies   Allergen Reactions    Gluten Meal - Food Allergy Other (See Comments)     Celiac disease    Penicillin G Hives    Wheat Bran - Food Allergy GI Intolerance     Has celiac disease    Diclofenac Rash     Voltaren gel    Diclofenac Sodium Rash    Penicillins Rash and Hives           Objective   /76 (BP Location: Right arm, Patient Position: Sitting, Cuff Size: Standard)   Pulse 73   Temp 98.1 °F (36.7 °C) (Temporal)   Resp 13   Ht 5' 6\" (1.676 m)   Wt 98.4 kg (217 lb)   SpO2 97%   BMI 35.02 kg/m²     Physical Exam  Neurologic " Exam  She is well appearing.  Affect is appropriate. Body mass index is 35.02 kg/m².. She is awake alert and oriented.  Hearing and vision are grossly intact.   Her pupils are equal round reactive to light.  Her extraocular movements are intact.  Her face is symmetric.  Tongue is midline.  Facial sensation is intact and symmetric throughout.  Shoulder shrug is 5/5.  There is no drift or dysmetria.     She has full strength in her bilateral upper and lower extremities.  She has normal muscle tone muscle bulk.   Sobeida sign negative bilaterally.  Sensation intact to light touch and pinprick throughout.  Her gait is normal.     Her heart rate is regular.  Normal respiratory effort.  Abdomen nondistended.  Radial pulses 2+.     Imaging: We reviewed her MRI and MRA in detail as well as the report.  There is early filling of the venous plexus of her jugular foramen and hypoglossal canal.  No evidence of distended cortical venous drainage on MRA or MRI.    Administrative Statements   I have spent a total time of 60 minutes in caring for this patient on the day of the visit/encounter including Diagnostic results, Prognosis, Risks and benefits of tx options, Instructions for management, Patient and family education, Importance of tx compliance, Risk factor reductions, Impressions, Counseling / Coordination of care, Documenting in the medical record, Reviewing / ordering tests, medicine, procedures  , Obtaining or reviewing history  , and Communicating with other healthcare professionals .

## 2024-12-04 NOTE — ASSESSMENT & PLAN NOTE
MRA with potential dural AV fistula of the paraertebral venous system and hypoglossal canal/jugular foramen.  We reviewed her imaging in detail as well as the pathophysiology, natural history and diagnosis of dural AV fistulas.  We discussed healthy can potentially cause pulsatile tinnitus as well as intracranial hemorrhage should the venous pressure become too high with retrograde flow.  While her initial presentation was pulsatile tinnitus this appears to have largely resolved after a recent surgery.    After reviewing her images we discussed definitive diagnosis with formal arteriography especially with the changing symptoms and early venous filling on her MRI.  She understands formal arteriography and its risks including bleeding, vascular injury and stroke.  She has signed consent to this and we will plan on proceeding in the near future.Orders:    IR cerebral angiography; Future

## 2024-12-04 NOTE — ASSESSMENT & PLAN NOTE
See above.  This is only been bothersome in the past when it is quiet.  It has not kept her awake or bothered her sleep.  Furthermore most recently it has resolved.    Previously had pulsatile tinnitus of left ear which resolved.  Orders:    IR cerebral angiography; Future

## 2024-12-05 ENCOUNTER — TELEPHONE (OUTPATIENT)
Dept: NEUROSURGERY | Facility: CLINIC | Age: 79
End: 2024-12-05

## 2024-12-19 ENCOUNTER — TELEPHONE (OUTPATIENT)
Dept: RADIOLOGY | Facility: HOSPITAL | Age: 79
End: 2024-12-19

## 2024-12-19 NOTE — PRE-PROCEDURE INSTRUCTIONS
Pre-procedure Instructions for Interventional Radiology  [address] [address]  INTERVENTIONAL RADIOLOGY [phone]    You are scheduled for a/an cerebral angiogram.    On Friday 12-27-24.    Your tentative arrival time is 7:45am.  [facility] will notify you the day before your procedure with the exact arrival time and the location to arrive.    To prepare for your procedure:  Please arrange for someone to drive you home after the procedure and stay with you until the next morning if you are instructed to do so.  This is typically for patients receiving some type of sedative or anesthetic for the procedure.  DO NOT EAT OR DRINK ANYTHING after midnight on the evening before your procedure including candy & gum.  ONLY SIPS OF WATER with your medications are allowed on the morning of your procedure.  TAKE ALL OF YOUR REGULAR MEDICATIONS THE MORNING OF YOUR PROCEDURE with sips of water!  We may call you to stop some of your blood sugar, blood pressure and blood thinning medications depending on the procedure.  Please take all of these medications unless we instruct you to stop them.  If you have an allergy to x-ray dye, please contact Interventional Radiology for an x-ray dye preparation which usually consists of an oral steroid and Benadryl.    The day of your procedure:  Bring a list of the medications you take at home.  Bring medications you take for breathing problems (such as inhalers), medications for chest pain, or both.  Bring a case for your glasses or contacts.  Bring your insurance card and a form of photo ID.  Please leave all valuables such as credit cards and jewelry at home.  Report to the admitting office to the left of the registration desk in the main lobby at the [facility], Entrance B.  You will then be directed to the Short Stay Center.  While your procedure is being performed, your family may wait in the Radiology Waiting Room on the 1st floor in  Radiology.  if they need to leave, they may provide a number to be called following the procedure.   Be prepared to stay overnight just in case. Sometimes procedures will indicate the need for further observation or treatment.   If you are scheduled for a follow-up visit with the Interventional Radiologist after your procedure, you will be called with a date and time.    Special Instructions (Medications to stop taking before your procedure etc.):  Hold Lisinopril-HCTZ the morning of the procedure.  Hold Bexagliflozin for 4 days before the procedure.  Have outpatient labwork completed before day of procedure.

## 2024-12-21 ENCOUNTER — APPOINTMENT (OUTPATIENT)
Dept: LAB | Facility: HOSPITAL | Age: 79
End: 2024-12-21
Payer: MEDICARE

## 2024-12-21 DIAGNOSIS — H93.A1 PULSATILE TINNITUS OF RIGHT EAR: ICD-10-CM

## 2024-12-21 DIAGNOSIS — Z79.01 LONG TERM (CURRENT) USE OF ANTICOAGULANTS: ICD-10-CM

## 2024-12-21 DIAGNOSIS — R90.89 ABNORMAL CENTRAL NERVOUS SYSTEM DIAGNOSTIC IMAGING: ICD-10-CM

## 2024-12-21 DIAGNOSIS — I67.1 CEREBROVASCULAR DURAL AV FISTULA: ICD-10-CM

## 2024-12-21 DIAGNOSIS — Z01.812 PRE-OPERATIVE LABORATORY EXAMINATION: ICD-10-CM

## 2024-12-21 LAB
APTT PPP: 26 SECONDS (ref 23–34)
BASOPHILS # BLD AUTO: 0.04 THOUSANDS/ÂΜL (ref 0–0.1)
BASOPHILS NFR BLD AUTO: 1 % (ref 0–1)
EOSINOPHIL # BLD AUTO: 0.24 THOUSAND/ÂΜL (ref 0–0.61)
EOSINOPHIL NFR BLD AUTO: 4 % (ref 0–6)
ERYTHROCYTE [DISTWIDTH] IN BLOOD BY AUTOMATED COUNT: 13.4 % (ref 11.6–15.1)
HCT VFR BLD AUTO: 46.9 % (ref 34.8–46.1)
HGB BLD-MCNC: 15.4 G/DL (ref 11.5–15.4)
IMM GRANULOCYTES # BLD AUTO: 0.01 THOUSAND/UL (ref 0–0.2)
IMM GRANULOCYTES NFR BLD AUTO: 0 % (ref 0–2)
INR PPP: 1.02 (ref 0.85–1.19)
LYMPHOCYTES # BLD AUTO: 1.79 THOUSANDS/ÂΜL (ref 0.6–4.47)
LYMPHOCYTES NFR BLD AUTO: 29 % (ref 14–44)
MCH RBC QN AUTO: 32 PG (ref 26.8–34.3)
MCHC RBC AUTO-ENTMCNC: 32.8 G/DL (ref 31.4–37.4)
MCV RBC AUTO: 98 FL (ref 82–98)
MONOCYTES # BLD AUTO: 0.36 THOUSAND/ÂΜL (ref 0.17–1.22)
MONOCYTES NFR BLD AUTO: 6 % (ref 4–12)
NEUTROPHILS # BLD AUTO: 3.71 THOUSANDS/ÂΜL (ref 1.85–7.62)
NEUTS SEG NFR BLD AUTO: 60 % (ref 43–75)
NRBC BLD AUTO-RTO: 0 /100 WBCS
PLATELET # BLD AUTO: 207 THOUSANDS/UL (ref 149–390)
PMV BLD AUTO: 10.1 FL (ref 8.9–12.7)
PROTHROMBIN TIME: 13.9 SECONDS (ref 12.3–15)
RBC # BLD AUTO: 4.81 MILLION/UL (ref 3.81–5.12)
WBC # BLD AUTO: 6.15 THOUSAND/UL (ref 4.31–10.16)

## 2024-12-21 PROCEDURE — 36415 COLL VENOUS BLD VENIPUNCTURE: CPT

## 2024-12-21 PROCEDURE — 85730 THROMBOPLASTIN TIME PARTIAL: CPT

## 2024-12-21 PROCEDURE — 85025 COMPLETE CBC W/AUTO DIFF WBC: CPT

## 2024-12-21 PROCEDURE — 85610 PROTHROMBIN TIME: CPT

## 2024-12-23 ENCOUNTER — TELEPHONE (OUTPATIENT)
Age: 79
End: 2024-12-23

## 2024-12-23 NOTE — TELEPHONE ENCOUNTER
Patient wants to update provider.    She states she had labs completed recently and ask provider to take a look at them.    Also, scheduled for cerebral angiography on 12/27/24.

## 2024-12-27 ENCOUNTER — ANESTHESIA EVENT (OUTPATIENT)
Dept: RADIOLOGY | Facility: HOSPITAL | Age: 79
End: 2024-12-27
Payer: MEDICARE

## 2024-12-27 ENCOUNTER — ANESTHESIA (OUTPATIENT)
Dept: RADIOLOGY | Facility: HOSPITAL | Age: 79
End: 2024-12-27
Payer: MEDICARE

## 2024-12-27 ENCOUNTER — HOSPITAL ENCOUNTER (OUTPATIENT)
Dept: RADIOLOGY | Facility: HOSPITAL | Age: 79
Discharge: HOME | End: 2024-12-27
Attending: NEUROLOGICAL SURGERY
Payer: MEDICARE

## 2024-12-27 VITALS
RESPIRATION RATE: 16 BRPM | HEART RATE: 62 BPM | DIASTOLIC BLOOD PRESSURE: 72 MMHG | OXYGEN SATURATION: 92 % | TEMPERATURE: 98.2 F | HEIGHT: 70 IN | SYSTOLIC BLOOD PRESSURE: 154 MMHG | WEIGHT: 212 LBS | BODY MASS INDEX: 30.35 KG/M2

## 2024-12-27 DIAGNOSIS — I67.1 CEREBROVASCULAR DURAL AV FISTULA: ICD-10-CM

## 2024-12-27 DIAGNOSIS — H93.A1 PULSATILE TINNITUS OF RIGHT EAR: ICD-10-CM

## 2024-12-27 DIAGNOSIS — R90.89 ABNORMAL CENTRAL NERVOUS SYSTEM DIAGNOSTIC IMAGING: ICD-10-CM

## 2024-12-27 PROCEDURE — 36223 PLACE CATH CAROTID/INOM ART: CPT

## 2024-12-27 PROCEDURE — 36226 PLACE CATH VERTEBRAL ART: CPT

## 2024-12-27 PROCEDURE — C1769 GUIDE WIRE: HCPCS

## 2024-12-27 PROCEDURE — 36227 PLACE CATH XTRNL CAROTID: CPT

## 2024-12-27 PROCEDURE — 36224 PLACE CATH CAROTD ART: CPT | Performed by: NEUROLOGICAL SURGERY

## 2024-12-27 PROCEDURE — C1887 CATHETER, GUIDING: HCPCS

## 2024-12-27 PROCEDURE — C1894 INTRO/SHEATH, NON-LASER: HCPCS

## 2024-12-27 PROCEDURE — 76937 US GUIDE VASCULAR ACCESS: CPT | Performed by: NEUROLOGICAL SURGERY

## 2024-12-27 PROCEDURE — 76937 US GUIDE VASCULAR ACCESS: CPT

## 2024-12-27 PROCEDURE — 36224 PLACE CATH CAROTD ART: CPT

## 2024-12-27 PROCEDURE — 36227 PLACE CATH XTRNL CAROTID: CPT | Performed by: NEUROLOGICAL SURGERY

## 2024-12-27 RX ORDER — LIDOCAINE HYDROCHLORIDE 10 MG/ML
INJECTION, SOLUTION EPIDURAL; INFILTRATION; INTRACAUDAL; PERINEURAL AS NEEDED
Status: COMPLETED | OUTPATIENT
Start: 2024-12-27 | End: 2024-12-27

## 2024-12-27 RX ORDER — OXYCODONE AND ACETAMINOPHEN 5; 325 MG/1; MG/1
1 TABLET ORAL EVERY 6 HOURS PRN
Status: DISCONTINUED | OUTPATIENT
Start: 2024-12-27 | End: 2024-12-28 | Stop reason: HOSPADM

## 2024-12-27 RX ORDER — SODIUM CHLORIDE 9 MG/ML
75 INJECTION, SOLUTION INTRAVENOUS CONTINUOUS
Status: DISCONTINUED | OUTPATIENT
Start: 2024-12-27 | End: 2024-12-28 | Stop reason: HOSPADM

## 2024-12-27 RX ORDER — NITROGLYCERIN 20 MG/100ML
INJECTION INTRAVENOUS
Status: COMPLETED | OUTPATIENT
Start: 2024-12-27 | End: 2024-12-27

## 2024-12-27 RX ORDER — VERAPAMIL HYDROCHLORIDE 2.5 MG/ML
INJECTION, SOLUTION INTRAVENOUS AS NEEDED
Status: COMPLETED | OUTPATIENT
Start: 2024-12-27 | End: 2024-12-27

## 2024-12-27 RX ORDER — SODIUM CHLORIDE 9 MG/ML
75 INJECTION, SOLUTION INTRAVENOUS CONTINUOUS
Status: DISCONTINUED | OUTPATIENT
Start: 2024-12-27 | End: 2024-12-27

## 2024-12-27 RX ORDER — PROPOFOL 10 MG/ML
INJECTION, EMULSION INTRAVENOUS CONTINUOUS PRN
Status: DISCONTINUED | OUTPATIENT
Start: 2024-12-27 | End: 2024-12-27

## 2024-12-27 RX ORDER — FENTANYL CITRATE 50 UG/ML
INJECTION, SOLUTION INTRAMUSCULAR; INTRAVENOUS AS NEEDED
Status: DISCONTINUED | OUTPATIENT
Start: 2024-12-27 | End: 2024-12-27

## 2024-12-27 RX ORDER — IODIXANOL 320 MG/ML
200 INJECTION, SOLUTION INTRAVASCULAR
Status: COMPLETED | OUTPATIENT
Start: 2024-12-27 | End: 2024-12-27

## 2024-12-27 RX ORDER — HEPARIN SODIUM 1000 [USP'U]/ML
INJECTION, SOLUTION INTRAVENOUS; SUBCUTANEOUS AS NEEDED
Status: COMPLETED | OUTPATIENT
Start: 2024-12-27 | End: 2024-12-27

## 2024-12-27 RX ORDER — SODIUM CHLORIDE 9 MG/ML
INJECTION, SOLUTION INTRAVENOUS CONTINUOUS PRN
Status: DISCONTINUED | OUTPATIENT
Start: 2024-12-27 | End: 2024-12-27

## 2024-12-27 RX ADMIN — FENTANYL CITRATE 25 MCG: 50 INJECTION INTRAMUSCULAR; INTRAVENOUS at 09:08

## 2024-12-27 RX ADMIN — NITROGLYCERIN 600 MCG: 20 INJECTION INTRAVENOUS at 09:29

## 2024-12-27 RX ADMIN — SODIUM CHLORIDE 75 ML/HR: 0.9 INJECTION, SOLUTION INTRAVENOUS at 07:30

## 2024-12-27 RX ADMIN — PROPOFOL 50 MCG/KG/MIN: 10 INJECTION, EMULSION INTRAVENOUS at 09:09

## 2024-12-27 RX ADMIN — VERAPAMIL HYDROCHLORIDE 5 MG: 2.5 INJECTION INTRAVENOUS at 09:31

## 2024-12-27 RX ADMIN — SODIUM CHLORIDE: 0.9 INJECTION, SOLUTION INTRAVENOUS at 08:51

## 2024-12-27 RX ADMIN — LIDOCAINE HYDROCHLORIDE 1 ML: 10 INJECTION, SOLUTION EPIDURAL; INFILTRATION; INTRACAUDAL; PERINEURAL at 09:31

## 2024-12-27 RX ADMIN — FENTANYL CITRATE 25 MCG: 50 INJECTION INTRAMUSCULAR; INTRAVENOUS at 09:10

## 2024-12-27 RX ADMIN — LIDOCAINE HYDROCHLORIDE 3 ML: 10 INJECTION, SOLUTION EPIDURAL; INFILTRATION; INTRACAUDAL; PERINEURAL at 09:29

## 2024-12-27 RX ADMIN — IODIXANOL 120 ML: 320 INJECTION, SOLUTION INTRAVASCULAR at 10:09

## 2024-12-27 RX ADMIN — NITROGLYCERIN 400 MCG: 20 INJECTION INTRAVENOUS at 09:31

## 2024-12-27 RX ADMIN — HEPARIN SODIUM 4000 UNITS: 1000 INJECTION INTRAVENOUS; SUBCUTANEOUS at 09:31

## 2024-12-27 NOTE — DISCHARGE INSTRUCTIONS
Today, you underwent a diagnostic cerebral angiogram under the care of Dr. Lopez        Moderate Sedation   WHAT YOU NEED TO KNOW:   Moderate sedation, or conscious sedation, is medicine used during procedures to help you feel relaxed and calm. You will be awake and able to follow directions without anxiety or pain. You will remember little to none of the procedure. You may feel tired, weak, or unsteady on your feet after you get sedation. You may also have trouble concentrating or short-term memory loss. These symptoms should go away in 24 hours or less.   DISCHARGE INSTRUCTIONS:   Call 911 or have someone else call for any of the following:   You have sudden trouble breathing.     You cannot be woken.  Seek care immediately if:   You have a severe headache or dizziness.     Your heart is beating faster than usual.  Contact your healthcare provider if:   You have a fever.     You have nausea or are vomiting for more than 8 hours after the procedure.      Your skin is itchy, swollen, or you have a rash.     You have questions or concerns about your condition or care.  Self-care:   Have someone stay with you for 24 hours. This person can drive you to errands and help you do things around the house. This person can also watch for problems.      Rest and do quiet activities for 24 hours. Do not exercise, ride a bike, or play sports. Stand up slowly to prevent dizziness and falls. Take short walks around the house with another person. Slowly return to your usual activities the next day.      Do not drive or use dangerous machines or tools for 24 hours. You may injure yourself or others. Examples include a lawnmower, saw, or drill. Do not return to work for 24 hours if you use dangerous machines or tools for work.      Do not make important decisions for 24 hours. For example, do not sign important papers or invest money.      Drink liquids as directed. Liquids help flush the sedation medicine out of your body. Ask how  much liquid to drink each day and which liquids are best for you.      Eat small, frequent meals to prevent nausea and vomiting. Start with clear liquids such as juice or broth. If you do not vomit after clear liquids, you can eat your usual foods.      Do not drink alcohol or take medicines that make you drowsy. This includes medicines that help you sleep and anxiety medicines. Ask your healthcare provider if it is safe for you to take pain medicine.  Follow up with your healthcare provider as directed: Write down your questions so you remember to ask them during your visits.   © 2017 [employer] Information is for End User's use only and may not be sold, redistributed or otherwise used for commercial purposes. All illustrations and images included in CareNotes® are the copyrighted property of [employer] or [employer].  The above information is an  only. It is not intended as medical advice for individual conditions or treatments. Talk to your doctor, nurse or pharmacist before following any medical regimen to see if it is safe and effective for you.     ?  The following instructions will help you care for yourself, or be cared for upon your return home today. These are guidelines for your care right after your surgery only.   ?  Notify Your Doctor or Nurse if you have any of the following:  ?  SYMPTOMS OF WOUND INFECTION--   Increased pain in or around the incision   Swelling around the incision  Any drainage from the incision  Incision separates or opens up  Warmth in the tissues around the incision  Redness or tenderness on the skin near the incision   Fever (temperature greater than 101 degrees F)   ?  NEUROLOGICAL CHANGES--  Change in alertness  Increased sleepiness   Nausea and vomiting   New onset of numbness or weakness in arms or legs   New problems with your bowels or bladder  New or worse problems with balance or walking  Seizures, new or  worsening  ?  UNRELIEVED HEADACHE PAIN--  New or increased pain unrelieved with pain medications   Pain associated with nausea and vomiting   Pain associated with other symptoms  ?  QUESTIONS OR PROBLEMS--  Any questions or problems that you are unsure about  Wound Care:  Keep Incision Clean and Dry   You may shower daily, but do not soak incision. Pat dry after showering.   No tub baths, soaking, swimming for 1 week after angiogram.   You do not need to cover the incision. Mild to moderate bruising and tenderness to the site is expected and may last up to 1-2 weeks after your procedure.   ?  A closure device was placed at the catheter insertion site. This is MRI compatible. Remove the dressing 24 hours after your procedure.   If your groin site is bleeding, apply firm pressure for 10 minutes. Reinforce dressing rather than removing and checking frequently. If continues to bleed through the dressing after 1 hour, contact your neurosurgeon's office.   Anticipatory Education:  ?  PAIN MED W/ Acetaminophen (Tylenol)  --IF a prescription for pain medicine has been sent home with you:  --Narcotic pain medication may cause constipation. Be sure to take stool softeners or laxatives while you are on narcotic pain medication.   --Do not drive after taking prescription pain medicine.   ?  If this medicine is too strong, or no longer necessary, or we did NOT recommend/prescribe oral narcotics, you may take:   - Tylenol Extra-strength/Acetaminophen, 2 tablets every 4-6 hours as needed for mild pain. DO NOT TAKE MORE THAN 4000MG PER DAY from combined sources. NOTE: Remember to eat when taking pain medicines in order to avoid nausea. Watch for constipation. Eat plenty of fruits, vegetables, juices, and drink 6-8 glasses of water each day.   Constipation: Stay active and drink at least 6-8 cups of fluid each day to prevent constipation. If you need a laxative or stool softener follow the package directions or consult with your  local pharmacists if you have questions.  ?  After anesthesia, rest for 24 hours. Do not drive, drink alcohol beverages or make any important decisions during this time. General anesthesia may cause sore throat, jaw discomfort or muscle aches. These symptoms can last for one or two days.  Activity: Please follow these instructions:  Advance your activity as you can tolerate. You may do light house work; nothing strenuous   You may walk all you want. You may go up and down the steps. Use the railing for support  Do not do excessive bending, straining or heavy lifting for 48 hours after your procedure  Do not drive or return to work until you are instructed   It is normal for your energy level and sleep patterns to change after surgery.   Get extra sleep at night and take naps during the day to help you feel less tired.   Take rest periods during the day.   Complete recovery may take several weeks.  ?  You may resume driving after 24-48 hours recovery.   You may return to work after 48 hours of recovery.   ?  Diet:  Your doctor has recommended that you follow these diet instructions at home. Refer to the patient education materials you received during your hospital stay. If you would like more nutrition counseling, ask your doctor about making an appointment with an outpatient dietitian.  Resume your home diet  ?  Medications:  Please resume your home medications as instructed.   ?  Home Supplies and Equipment:  none  Additional Contacts:  ?  CONTACTS FOR NEUROSURGERY: You may call your neurosurgeon’s office if you have questions between 8:30 am and 4:30 pm. You may request to speak to the nurse practitioner who is available Monday through Friday.   ?  For off hours or the weekend you may call your neurosurgeon's office to leave a message.

## 2024-12-27 NOTE — H&P
"Patient seen and examined independently.  Clinic note from 12/4/24 remains current.  Patient is not on any new medications and has not had any new medical problems.    /77 (BP Location: Right arm)   Pulse 61   Temp 97.9 °F (36.6 °C) (Temporal)   Resp 16   Ht 5' 10\" (1.778 m)   Wt 96.2 kg (212 lb)   SpO2 96%   BMI 30.42 kg/m²  On exam, patient is neurologically intact.  Heart rate is regular.  Breath sounds are clear.  Plan to proceed with diagnostic cerebral arteriogram to better delineate possible davf.    "

## 2024-12-27 NOTE — ANESTHESIA PREPROCEDURE EVALUATION
Procedure:  IR CEREBRAL ANGIOGRAPHY    Relevant Problems   CARDIO   (+) Coronary artery calcification seen on CT scan   (+) Essential hypertension   (+) Mixed hyperlipidemia   (+) Peripheral venous insufficiency   (+) 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   (+) Varicose veins of both lower extremities with complications      ENDO   (+) Hypothyroidism   (+) Type 2 diabetes mellitus (HCC)   (+) Type 2 diabetes mellitus with stage 2 chronic kidney disease, without long-term current use of insulin  (HCC)      GI/HEPATIC   (+) Hiatal hernia      /RENAL   (+) Nephrolithiasis   (+) Renal cyst      MUSCULOSKELETAL   (+) Gout   (+) Hiatal hernia      PULMONARY   (+) DEEPAK on CPAP      Ear/Nose/Throat   (+) Pulsatile tinnitus of right ear      Neurology/Sleep   (+) Cerebrovascular disease   (+) Cerebrovascular dural AV fistula      Other   (+) Proteinuria      CBC: WNL    CMP: [id number]    TTE Sept 2023: Normal    EKG: SR    Physical Exam    Airway    Mallampati score: IV  TM Distance: >3 FB  Neck ROM: full     Dental       Cardiovascular      Pulmonary      Other Findings  post-pubertal.    Anesthesia Plan  ASA Score- 3     Anesthesia Type- IV sedation with anesthesia with ASA Monitors.         Additional Monitors:     Airway Plan:            Plan Factors-    Chart reviewed. EKG reviewed. Imaging results reviewed. Existing labs reviewed. Patient summary reviewed.    Patient is not a current smoker.  Patient did not smoke on day of surgery.            Induction- intravenous.    Postoperative Plan-     Perioperative Resuscitation Plan - Level 1 - Full Code.       Informed Consent- Anesthetic plan and risks discussed with patient.  I personally reviewed this patient with the CRNA. Discussed and agreed on the Anesthesia Plan with the CRNA..

## 2024-12-27 NOTE — ANESTHESIA POSTPROCEDURE EVALUATION
Post-Op Assessment Note    Last Filed PACU Vitals:  Vitals Value Taken Time   Temp     Pulse     BP     Resp     SpO2         Modified Lolly:  Activity: 2 (12/27/2024 10:15 AM)  Respiration: 2 (12/27/2024 10:15 AM)  Circulation: 2 (12/27/2024 10:15 AM)  Consciousness: 2 (12/27/2024 10:15 AM)  Oxygen Saturation: 2 (12/27/2024 10:15 AM)  Modified Lolly Score: 10 (12/27/2024 10:15 AM)

## 2024-12-27 NOTE — OP NOTE
OPERATIVE REPORT  PATIENT NAME: Enedelia Davis     :  1945  MRN: [id number]   Pt Location: Interventional radiology    SURGERY DATE: 24     Preop Diagnosis:  1.  Pulsatile tinnitus  2.  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  Pulsatile tinnitus  2.  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 female who has intermingled tinnitus affecting her right ear and concern for early venous filling along her right jugular foramen.  After discussing the risks and benefits of a diagnostic cerebral arteriogram including bleeding, stroke, groin hematoma, and death the patient elected to proceed.     Procedure Details:  After obtaining written informed consent, the patient was brought into the operating room and moved to the OR table in supine fashion. The right radial artery was prepped and draped in the usual sterile fashion. Monitored anesthesia care was induced.  A surgical time-out was performed.  3 cc mixture of lidocaine and 400 mcg of nitroglycerin was injected immediately above the right radial artery.  Ultrasound guidance under real-time visualization was used to guide the micro puncture needle into the right radial artery. Next, a 5 Estonian tapered sheath was then placed.  Next and infusion of 300 mcg of nitroglycerin, 4000 units heparin, and 5 mg of verapamil were slowly injected intra-arterially through the right radial sheath.  Radial artery arteriogram then performed.  Next a 5 Prydeinig Shah glide catheter was advanced and reshaped.    The catheter was then withdrawn into the aortic arch and advanced into the left common carotid artery. AP lateral and oblique images of the left cervical carotid were obtained.     The catheter was then advanced and the left internal carotid artery was then catheterized. AP lateral and magnified oblique images of the left intracranial carotid circulation were obtained.     The external carotid artery was then catheterized.  Contralateral images were then obtained.    The right common carotid artery was then catheterized. AP lateral and oblique images of the right cervical carotid were obtained.     The catheter was then advanced and the right internal carotid artery was then catheterized. AP lateral and magnified oblique images of the right intracranial carotid circulation were obtained.     The right external carotid artery was then catheterized.  Transfacial lateral images were then obtained.    Right vertebral artery was catheterized.  Trans facial and lateral and oblique images of the right vertebral artery circulation were then obtained.    The catheter was then withdrawn from the body and a TR compression band was placed.  There was appropriate hemostasis.   The patient was then awoken from monitored anesthesia care and found to be in baseline neurologic condition. All sponge and needle counts were correct.        INTERPRETATION OF ANGIOGRAPHIC FINDINGS:   1.  The Left common carotid circulation reveals antegrade flow into the internal and external carotid arteries. There is no hemodynamically significant carotid stenosis as defined by NASCET criteria.      2. The Left internal carotid artery circulation reveals antegrade flow into the middle cerebral and anterior cerebral arteries. There is a patent anterior communicating artery. There is a patent posterior communicating artery with infundibular origin. There is no evidence of aneurysm, AVM,  or vascular malformation. The capillary and venous phases are unremarkable.     3.  The left external carotid artery has antegrade flow into the extracranial circulation.  There is evidence of early venous shunt or fistula.    4. The Right common carotid circulation reveals antegrade flow into the internal and external carotid arteries. There is an soft plaque just distal bifurcation with less than 50% stenosis.      5. The Right internal carotid artery circulation reveals antegrade flow into the middle cerebral and anterior cerebral arteries. There is a patent anterior communicating artery. There is a patent posterior communicating artery. There is no evidence of aneurysm, AVM, or vascular malformation. The capillary and venous phases are unremarkable.     6.  The right external carotid artery and flow into the extracranial circulation.  There is no evidence of early venous filling or fistula.    7. The Right vertebral intracranial circulation reveals retrograde reflux down the contralateral vertebral artery. Both PICAs are well visualized. Antegrade flow is present intoall the posterior circulation branches. There are no AVMs or aneurysms.  There is a large vertebral plexus which fills in phase.  No evidence of early shunting.    Impression:  Normal cerebral angiogram    Patient Disposition:  APU    SIGNATURE: Greg Lopez MD  DATE: 12/27/24   TIME: 10:10 AM

## 2024-12-27 NOTE — ANESTHESIA POSTPROCEDURE EVALUATION
Post-Op Assessment Note    CV Status:  Stable    Pain management: adequate       Mental Status:  Alert and awake   Hydration Status:  Euvolemic   PONV Controlled:  Controlled   Airway Patency:  Patent     Post Op Vitals Reviewed: Yes    No anethesia notable event occurred.    Staff: Anesthesiologist, CRNA           Last Filed PACU Vitals:  Vitals Value Taken Time   Temp     Pulse 72    /66    Resp 16    SpO2 98        Modified Lolly:  Activity: 2 (12/27/2024  8:45 AM)  Respiration: 2 (12/27/2024  8:45 AM)  Circulation: 2 (12/27/2024  8:45 AM)  Consciousness: 2 (12/27/2024  8:45 AM)  Oxygen Saturation: 2 (12/27/2024  8:45 AM)  Modified Lolly Score: 10 (12/27/2024  8:45 AM)

## 2024-12-27 NOTE — SEDATION DOCUMENTATION
Diagnostic cerebral angiogram completed in IR by Dr AYLIN Lopez.  TR band to right wrist puncture site with 17cc air. NV status to hand intact.  Bedrest start time 1015.

## 2025-01-02 ENCOUNTER — TELEPHONE (OUTPATIENT)
Dept: NEUROSURGERY | Facility: CLINIC | Age: 80
End: 2025-01-02

## 2025-01-02 NOTE — TELEPHONE ENCOUNTER
Completed post angio callback to Enedelia Davis at primary contact number. The patient denies any pain, swelling, drainage, fevers at the puncture site. Is not currently experiencing any numbness, tingling, or weakness in her acces site. Reports no headaches at this time. Advised that it is normal to experience fatigue and mild headaches for a few days after the procedure. Advised that tylenol should provide adequate relief. Explained that she should contact the office if she experiences any pain, swelling, or drainage from the site, and report to the ER or call 911 if she experiences WHOL, visual disturbance, of confusion/disorientation, slurred speech, ambulatory dysfunction. Reminded of post procedure follow-up scheduled on 1/15/2025. Patient appreciative of call.

## 2025-01-07 NOTE — TELEPHONE ENCOUNTER
Called patient to advise her of Dr. Lopez's response. She would like to keep her appointment to come in and review the images.

## 2025-01-09 ENCOUNTER — APPOINTMENT (OUTPATIENT)
Dept: LAB | Facility: HOSPITAL | Age: 80
End: 2025-01-09
Payer: MEDICARE

## 2025-01-09 DIAGNOSIS — E03.9 ACQUIRED HYPOTHYROIDISM: ICD-10-CM

## 2025-01-09 DIAGNOSIS — N18.2 TYPE 2 DIABETES MELLITUS WITH STAGE 2 CHRONIC KIDNEY DISEASE, WITHOUT LONG-TERM CURRENT USE OF INSULIN  (HCC): ICD-10-CM

## 2025-01-09 DIAGNOSIS — I10 ESSENTIAL HYPERTENSION: ICD-10-CM

## 2025-01-09 DIAGNOSIS — E78.2 MIXED HYPERLIPIDEMIA: ICD-10-CM

## 2025-01-09 DIAGNOSIS — M10.9 GOUT, UNSPECIFIED CAUSE, UNSPECIFIED CHRONICITY, UNSPECIFIED SITE: ICD-10-CM

## 2025-01-09 DIAGNOSIS — E11.9 TYPE 2 DIABETES MELLITUS WITHOUT COMPLICATION, WITHOUT LONG-TERM CURRENT USE OF INSULIN (HCC): ICD-10-CM

## 2025-01-09 DIAGNOSIS — E11.22 TYPE 2 DIABETES MELLITUS WITH STAGE 2 CHRONIC KIDNEY DISEASE, WITHOUT LONG-TERM CURRENT USE OF INSULIN  (HCC): ICD-10-CM

## 2025-01-09 LAB
ALBUMIN SERPL BCG-MCNC: 4.5 G/DL (ref 3.5–5)
ALP SERPL-CCNC: 61 U/L (ref 34–104)
ALT SERPL W P-5'-P-CCNC: 22 U/L (ref 7–52)
ANION GAP SERPL CALCULATED.3IONS-SCNC: 9 MMOL/L (ref 4–13)
AST SERPL W P-5'-P-CCNC: 19 U/L (ref 13–39)
BASOPHILS # BLD AUTO: 0.06 THOUSANDS/ΜL (ref 0–0.1)
BASOPHILS NFR BLD AUTO: 1 % (ref 0–1)
BILIRUB SERPL-MCNC: 0.67 MG/DL (ref 0.2–1)
BUN SERPL-MCNC: 17 MG/DL (ref 5–25)
CALCIUM SERPL-MCNC: 9.5 MG/DL (ref 8.4–10.2)
CHLORIDE SERPL-SCNC: 101 MMOL/L (ref 96–108)
CHOLEST SERPL-MCNC: 112 MG/DL (ref ?–200)
CO2 SERPL-SCNC: 27 MMOL/L (ref 21–32)
CREAT SERPL-MCNC: 0.79 MG/DL (ref 0.6–1.3)
CREAT UR-MCNC: 89.4 MG/DL
EOSINOPHIL # BLD AUTO: 0.31 THOUSAND/ΜL (ref 0–0.61)
EOSINOPHIL NFR BLD AUTO: 5 % (ref 0–6)
ERYTHROCYTE [DISTWIDTH] IN BLOOD BY AUTOMATED COUNT: 13.2 % (ref 11.6–15.1)
EST. AVERAGE GLUCOSE BLD GHB EST-MCNC: 143 MG/DL
GFR SERPL CREATININE-BSD FRML MDRD: 72 ML/MIN/1.73SQ M
GLUCOSE P FAST SERPL-MCNC: 144 MG/DL (ref 65–99)
HBA1C MFR BLD: 6.6 %
HCT VFR BLD AUTO: 47.7 % (ref 34.8–46.1)
HDLC SERPL-MCNC: 39 MG/DL
HGB BLD-MCNC: 15.8 G/DL (ref 11.5–15.4)
IMM GRANULOCYTES # BLD AUTO: 0.01 THOUSAND/UL (ref 0–0.2)
IMM GRANULOCYTES NFR BLD AUTO: 0 % (ref 0–2)
LDLC SERPL CALC-MCNC: 49 MG/DL (ref 0–100)
LYMPHOCYTES # BLD AUTO: 2.13 THOUSANDS/ΜL (ref 0.6–4.47)
LYMPHOCYTES NFR BLD AUTO: 32 % (ref 14–44)
MCH RBC QN AUTO: 31.8 PG (ref 26.8–34.3)
MCHC RBC AUTO-ENTMCNC: 33.1 G/DL (ref 31.4–37.4)
MCV RBC AUTO: 96 FL (ref 82–98)
MICROALBUMIN UR-MCNC: 32 MG/L
MICROALBUMIN/CREAT 24H UR: 36 MG/G CREATININE (ref 0–30)
MONOCYTES # BLD AUTO: 0.46 THOUSAND/ΜL (ref 0.17–1.22)
MONOCYTES NFR BLD AUTO: 7 % (ref 4–12)
NEUTROPHILS # BLD AUTO: 3.66 THOUSANDS/ΜL (ref 1.85–7.62)
NEUTS SEG NFR BLD AUTO: 55 % (ref 43–75)
NONHDLC SERPL-MCNC: 73 MG/DL
NRBC BLD AUTO-RTO: 0 /100 WBCS
PLATELET # BLD AUTO: 239 THOUSANDS/UL (ref 149–390)
PMV BLD AUTO: 10 FL (ref 8.9–12.7)
POTASSIUM SERPL-SCNC: 3.9 MMOL/L (ref 3.5–5.3)
PROT SERPL-MCNC: 7.2 G/DL (ref 6.4–8.4)
RBC # BLD AUTO: 4.97 MILLION/UL (ref 3.81–5.12)
SODIUM SERPL-SCNC: 137 MMOL/L (ref 135–147)
TRIGL SERPL-MCNC: 121 MG/DL (ref ?–150)
TSH SERPL DL<=0.05 MIU/L-ACNC: 1.59 UIU/ML (ref 0.45–4.5)
URATE SERPL-MCNC: 4 MG/DL (ref 2–7.5)
WBC # BLD AUTO: 6.63 THOUSAND/UL (ref 4.31–10.16)

## 2025-01-09 PROCEDURE — 80061 LIPID PANEL: CPT

## 2025-01-09 PROCEDURE — 85025 COMPLETE CBC W/AUTO DIFF WBC: CPT

## 2025-01-09 PROCEDURE — 83036 HEMOGLOBIN GLYCOSYLATED A1C: CPT

## 2025-01-09 PROCEDURE — 80053 COMPREHEN METABOLIC PANEL: CPT

## 2025-01-09 PROCEDURE — 36415 COLL VENOUS BLD VENIPUNCTURE: CPT

## 2025-01-09 PROCEDURE — 84443 ASSAY THYROID STIM HORMONE: CPT

## 2025-01-09 PROCEDURE — 84550 ASSAY OF BLOOD/URIC ACID: CPT

## 2025-01-10 ENCOUNTER — RESULTS FOLLOW-UP (OUTPATIENT)
Dept: INTERNAL MEDICINE CLINIC | Facility: CLINIC | Age: 80
End: 2025-01-10

## 2025-01-14 ENCOUNTER — OFFICE VISIT (OUTPATIENT)
Dept: INTERNAL MEDICINE CLINIC | Facility: CLINIC | Age: 80
End: 2025-01-14
Payer: MEDICARE

## 2025-01-14 VITALS
WEIGHT: 216.9 LBS | TEMPERATURE: 97.7 F | SYSTOLIC BLOOD PRESSURE: 142 MMHG | HEART RATE: 68 BPM | BODY MASS INDEX: 31.05 KG/M2 | OXYGEN SATURATION: 97 % | DIASTOLIC BLOOD PRESSURE: 76 MMHG | HEIGHT: 70 IN

## 2025-01-14 DIAGNOSIS — E03.9 ACQUIRED HYPOTHYROIDISM: ICD-10-CM

## 2025-01-14 DIAGNOSIS — E78.2 MIXED HYPERLIPIDEMIA: ICD-10-CM

## 2025-01-14 DIAGNOSIS — N18.31 STAGE 3A CHRONIC KIDNEY DISEASE (HCC): ICD-10-CM

## 2025-01-14 DIAGNOSIS — F34.1 PERSISTENT DEPRESSIVE DISORDER: ICD-10-CM

## 2025-01-14 DIAGNOSIS — L90.0 LICHEN SCLEROSUS: ICD-10-CM

## 2025-01-14 DIAGNOSIS — J30.89 PERENNIAL ALLERGIC RHINITIS: ICD-10-CM

## 2025-01-14 DIAGNOSIS — E55.9 VITAMIN D DEFICIENCY: ICD-10-CM

## 2025-01-14 DIAGNOSIS — G43.909 MIGRAINE WITHOUT STATUS MIGRAINOSUS, NOT INTRACTABLE, UNSPECIFIED MIGRAINE TYPE: ICD-10-CM

## 2025-01-14 DIAGNOSIS — E11.22 TYPE 2 DIABETES MELLITUS WITH STAGE 2 CHRONIC KIDNEY DISEASE, WITHOUT LONG-TERM CURRENT USE OF INSULIN  (HCC): Primary | ICD-10-CM

## 2025-01-14 DIAGNOSIS — N18.2 TYPE 2 DIABETES MELLITUS WITH STAGE 2 CHRONIC KIDNEY DISEASE, WITHOUT LONG-TERM CURRENT USE OF INSULIN  (HCC): Primary | ICD-10-CM

## 2025-01-14 DIAGNOSIS — D75.1 ERYTHROCYTOSIS: ICD-10-CM

## 2025-01-14 PROCEDURE — 99214 OFFICE O/P EST MOD 30 MIN: CPT | Performed by: FAMILY MEDICINE

## 2025-01-14 RX ORDER — SUMATRIPTAN 50 MG/1
50 TABLET, FILM COATED ORAL ONCE AS NEEDED
Qty: 9 TABLET | Refills: 3 | Status: SHIPPED | OUTPATIENT
Start: 2025-01-14

## 2025-01-14 RX ORDER — CITALOPRAM HYDROBROMIDE 10 MG/1
10 TABLET ORAL DAILY
Qty: 90 TABLET | Refills: 3 | Status: SHIPPED | OUTPATIENT
Start: 2025-01-14

## 2025-01-14 RX ORDER — MOMETASONE FUROATE 1 MG/G
1 CREAM TOPICAL WEEKLY
Qty: 15 G | Refills: 0 | Status: SHIPPED | OUTPATIENT
Start: 2025-01-14

## 2025-01-14 RX ORDER — ATORVASTATIN CALCIUM 20 MG/1
20 TABLET, FILM COATED ORAL DAILY
Qty: 90 TABLET | Refills: 3 | Status: SHIPPED | OUTPATIENT
Start: 2025-01-14

## 2025-01-14 NOTE — ASSESSMENT & PLAN NOTE
Reviewed previous laboratory testing with her.  Continue with atorvastatin.  Watch diet.  Increase fiber in diet.  Orders:  •  atorvastatin (LIPITOR) 20 mg tablet; Take 1 tablet (20 mg total) by mouth daily  •  CBC and differential; Future  •  Comprehensive metabolic panel; Future  •  Lipid panel; Future

## 2025-01-14 NOTE — ASSESSMENT & PLAN NOTE
Allergy symptoms reviewed.  Continue with the Allegra.  Discussed with her possibly alternating between different antihistamines to see if this is more effective.  Orders:  •  CBC and differential; Future

## 2025-01-14 NOTE — ASSESSMENT & PLAN NOTE
Continue current dose of the levothyroxine.  Will continue to follow TSH with routine laboratory testing and adjust dose if needed.  Orders:  •  CBC and differential; Future  •  Comprehensive metabolic panel; Future  •  TSH, 3rd generation; Future

## 2025-01-14 NOTE — ASSESSMENT & PLAN NOTE
Continue to follow-up with specialists.  Continue with the mometasone on an as-needed basis.  Orders:  •  mometasone (ELOCON) 0.1 % cream; Apply 1 Application topically once a week  •  CBC and differential; Future

## 2025-01-14 NOTE — ASSESSMENT & PLAN NOTE
Lab Results   Component Value Date    EGFR 72 01/09/2025    EGFR 65 11/13/2024    EGFR 69 09/13/2024    CREATININE 0.79 01/09/2025    CREATININE 0.86 11/13/2024    CREATININE 0.82 09/13/2024   Creatinine and GFR are improved.  Stay adequately hydrated.  Avoid nephrotoxins.

## 2025-01-14 NOTE — ASSESSMENT & PLAN NOTE
Hemoglobin A1c has increased slightly.  Still controlled at present.  Continue to follow-up with endocrinology.  Continue with the current medications.  Watch carbohydrate intake.  Try to remain as active as possible.  Follow-up with ophthalmology regularly.  Check feet daily.  Lab Results   Component Value Date    HGBA1C 6.6 (H) 01/09/2025     Orders:  •  CBC and differential; Future  •  Comprehensive metabolic panel; Future  •  Hemoglobin A1C; Future

## 2025-01-14 NOTE — ASSESSMENT & PLAN NOTE
Hemoglobin and hematocrit are on the higher side.  This may be related to hydration status at the time of the laboratory testing.  Discussed with her staying well-hydrated.  Will continue to follow this with routine laboratory testing and possibly refer to hematology if it persists.  Orders:  •  CBC and differential; Future

## 2025-01-14 NOTE — PROGRESS NOTES
Name: Enedelia Davis      : 1945      MRN: [id number]  Encounter Provider: Toya Cormier MD  Encounter Date: 2025   Encounter department: [facility]  :  Assessment & Plan  Mixed hyperlipidemia  Reviewed previous laboratory testing with her.  Continue with atorvastatin.  Watch diet.  Increase fiber in diet.  Orders:  •  atorvastatin (LIPITOR) 20 mg tablet; Take 1 tablet (20 mg total) by mouth daily  •  CBC and differential; Future  •  Comprehensive metabolic panel; Future  •  Lipid panel; Future    Persistent depressive disorder  Mood has been relatively stable.  Continue with the Celexa.  Watch for any mood changes especially with the winter weather.  She is traveling to [place] soon and hopefully this will help to avoid the weather affecting her mood.    Orders:  •  citalopram (CeleXA) 10 mg tablet; Take 1 tablet (10 mg total) by mouth daily  •  CBC and differential; Future    Type 2 diabetes mellitus with stage 2 chronic kidney disease, without long-term current use of insulin  (HCC)  Hemoglobin A1c has increased slightly.  Still controlled at present.  Continue to follow-up with endocrinology.  Continue with the current medications.  Watch carbohydrate intake.  Try to remain as active as possible.  Follow-up with ophthalmology regularly.  Check feet daily.  Lab Results   Component Value Date    HGBA1C 6.6 (H) 2025     Orders:  •  CBC and differential; Future  •  Comprehensive metabolic panel; Future  •  Hemoglobin A1C; Future    Acquired hypothyroidism  Continue current dose of the levothyroxine.  Will continue to follow TSH with routine laboratory testing and adjust dose if needed.  Orders:  •  CBC and differential; Future  •  Comprehensive metabolic panel; Future  •  TSH, 3rd generation; Future    Perennial allergic rhinitis  Allergy symptoms reviewed.  Continue with the Allegra.  Discussed with her possibly alternating between different antihistamines to see if  this is more effective.  Orders:  •  CBC and differential; Future    Vitamin D deficiency  Continue with vitamin D supplementation.  Will continue to follow vitamin D level with routine laboratory testing and adjust dose if needed.  Orders:  •  CBC and differential; Future  •  Vitamin D 25 hydroxy; Future    Erythrocytosis  Hemoglobin and hematocrit are on the higher side.  This may be related to hydration status at the time of the laboratory testing.  Discussed with her staying well-hydrated.  Will continue to follow this with routine laboratory testing and possibly refer to hematology if it persists.  Orders:  •  CBC and differential; Future    Migraine without status migrainosus, not intractable, unspecified migraine type  Has been having some migraine symptoms which are relieved with the Imitrex.  Discussed with her that this could be related to the recent arteriogram.  She plans on discussing this further with neurosurgery tomorrow.  Orders:  •  SUMAtriptan (IMITREX) 50 mg tablet; Take 1 tablet (50 mg total) by mouth once as needed for migraine  •  CBC and differential; Future    Lichen sclerosus  Continue to follow-up with specialists.  Continue with the mometasone on an as-needed basis.  Orders:  •  mometasone (ELOCON) 0.1 % cream; Apply 1 Application topically once a week  •  CBC and differential; Future    Stage 3a chronic kidney disease (HCC)  Lab Results   Component Value Date    EGFR 72 01/09/2025    EGFR 65 11/13/2024    EGFR 69 09/13/2024    CREATININE 0.79 01/09/2025    CREATININE 0.86 11/13/2024    CREATININE 0.82 09/13/2024   Creatinine and GFR are improved.  Stay adequately hydrated.  Avoid nephrotoxins.         Orders and recommendations as noted above.  She is up-to-date on immunizations.  Continue with mammograms yearly.  Continue with bone densities every other year.  Will have her follow-up in about 3 to 6 months or sooner if needed.     History of Present Illness     She presents for routine  follow-up.  Has generally been doing well.  She did have the arteriogram through neurosurgery with no significant abnormalities found.  She is very thankful for this.  She did have some migraines over the last 2 weeks since the arteriogram.  She was told that she could get headaches after that.  Has not had any this week yet.  She did take the Imitrex and that worked well for her headaches.  Tolerating her medications without difficulty.  Admits that she did not eat as well around the holidays.  Tolerating her medications well.  Denies any significant polyuria, polydipsia, or polyphasia.  Mood has been generally stable.  Continues with the Celexa.  She and her  will be spending time in [place] over this winter.  She continues with some allergy symptoms and has been taking the Allegra.  Tolerating her levothyroxine well.  Energy level has remained relatively stable.      Review of Systems   Constitutional:  Negative for activity change, appetite change, chills and fever.   HENT:  Negative for congestion and rhinorrhea.    Eyes:  Negative for visual disturbance.   Respiratory:  Negative for chest tightness and shortness of breath.    Cardiovascular:  Negative for chest pain and palpitations.   Gastrointestinal:  Negative for abdominal pain, blood in stool, diarrhea, nausea and vomiting.   Endocrine: Negative for polydipsia, polyphagia and polyuria.   Genitourinary:  Negative for dysuria, frequency and urgency.   Musculoskeletal:  Positive for arthralgias. Negative for gait problem.   Skin:  Negative for color change.   Allergic/Immunologic: Positive for environmental allergies.   Neurological:  Positive for headaches. Negative for dizziness.   Hematological:  Does not bruise/bleed easily.   Psychiatric/Behavioral:  Negative for confusion and sleep disturbance. The patient is not nervous/anxious.        Objective   /76 (BP Location: Left arm, Patient Position: Sitting, Cuff Size: Large)   Pulse 68   Temp  "97.7 °F (36.5 °C)   Ht 5' 10\" (1.778 m)   Wt 98.4 kg (216 lb 14.4 oz)   SpO2 97%   BMI 31.12 kg/m²      Physical Exam  Vitals and nursing note reviewed.   Constitutional:       General: She is not in acute distress.     Appearance: She is well-developed, well-groomed and overweight.   HENT:      Head: Normocephalic and atraumatic.   Eyes:      General:         Right eye: No discharge.         Left eye: No discharge.      Conjunctiva/sclera: Conjunctivae normal.      Pupils: Pupils are equal, round, and reactive to light.   Cardiovascular:      Rate and Rhythm: Normal rate and regular rhythm.      Heart sounds: Normal heart sounds. No murmur heard.     No friction rub. No gallop.   Pulmonary:      Effort: No respiratory distress.      Breath sounds: No wheezing or rales.   Abdominal:      General: Bowel sounds are normal. There is no distension.      Tenderness: There is no abdominal tenderness.   Lymphadenopathy:      Cervical: No cervical adenopathy.   Skin:     General: Skin is warm and dry.   Neurological:      Mental Status: She is alert and oriented to person, place, and time.   Psychiatric:         Mood and Affect: Mood and affect normal.         Speech: Speech normal.         Behavior: Behavior normal. Behavior is cooperative.         Cognition and Memory: Cognition and memory normal.         "

## 2025-01-15 ENCOUNTER — OFFICE VISIT (OUTPATIENT)
Dept: NEUROSURGERY | Facility: CLINIC | Age: 80
End: 2025-01-15
Payer: MEDICARE

## 2025-01-15 VITALS
OXYGEN SATURATION: 98 % | HEART RATE: 62 BPM | BODY MASS INDEX: 30.92 KG/M2 | DIASTOLIC BLOOD PRESSURE: 68 MMHG | WEIGHT: 216 LBS | TEMPERATURE: 98.6 F | RESPIRATION RATE: 13 BRPM | SYSTOLIC BLOOD PRESSURE: 132 MMHG | HEIGHT: 70 IN

## 2025-01-15 DIAGNOSIS — I67.1 CEREBROVASCULAR DURAL AV FISTULA: Primary | ICD-10-CM

## 2025-01-15 PROBLEM — G43.909 MIGRAINE: Status: ACTIVE | Noted: 2025-01-15

## 2025-01-15 PROCEDURE — 99213 OFFICE O/P EST LOW 20 MIN: CPT | Performed by: NEUROLOGICAL SURGERY

## 2025-01-15 NOTE — ASSESSMENT & PLAN NOTE
1.  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  Formal angiogram did not demonstrate any abnormal fistulous connection between her arterial and venous system in her posterior fossa.  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  I believe that this is likely the cause of her abnormal signal on her MRI.  There is no clear evidence of external carotid or subclavian fistulization of her venous system.  Ultimately this is a anatomic variant without any need for further follow-up.    2.  Intermittent pulsatile tinnitus.  This did affect her right ear previously.  She has not had any further episodes.  She states when it does happen it typically happens at night when she is laying on her side.  I believe that this is likely secondary to her drainage through her paravertebral plexus.

## 2025-01-15 NOTE — PROGRESS NOTES
Name: Enedelia Davis      : 1945      MRN: [id number]  Encounter Provider: Greg Lopez MD  Encounter Date: 1/15/2025   Encounter [facility]  :  Assessment & Plan  Cerebrovascular dural AV fistula  1.  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  Formal angiogram did not demonstrate any abnormal fistulous connection between her arterial and venous system in her posterior fossa.  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  I believe that this is likely the cause of her abnormal signal on her MRI.  There is no clear evidence of external carotid or subclavian fistulization of her venous system.  Ultimately this is a anatomic variant without any need for further follow-up.    2.  Intermittent pulsatile tinnitus.  This did affect her right ear previously.  She has not had any further episodes.  She states when it does happen it typically happens at night when she is laying on her side.  I believe that this is likely secondary to her drainage through her paravertebral plexus.           History of Present Illness   HPI  Pleasant 79-year-old female who presented to ENT for pulsatile tinnitus/hearing her pulse in her right ear.  Several years ago she did have a similar problem in her left ear which spontaneously resolved.  As part as her ENT evaluation an MRA of the head was obtained which was concerning for possible dural AV fistula and arterialized venous flow in her right hypoglossal canal and jugular foramen.     She ultimately underwent formal arteriography without any evidence of dural AV fistula.  Post procedurally she had 2 episodes of migraines which have not happened over the last week.  Her pulsatile tinnitus, which previously affected her right ear predominantly has not been  "as significant.  She has no other neurologic complaints.     Her past medical history is for hypertension, diabetes, celiac disease and kidney stones.  She has had a hysterectomy, cholecystectomy, and appendectomy in the past.     She is allergic to penicillin and Voltaren.     She is .  She has 3 children.  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  No tobacco or illicit drug use.  No alcohol use.     No family history of brain aneurysms or intracranial hemorrhage.  She does have family history of sudden death in her mother, she believes that this may be secondary to cardiac disease.    Review of Systems   Constitutional:  Negative for fatigue.   HENT:  Negative for tinnitus.    Eyes:  Negative for visual disturbance.   Gastrointestinal: Negative.    Genitourinary: Negative.    Musculoskeletal:  Negative for gait problem.   Neurological:  Positive for headaches (2 episodes of headaches back to back since procedure). Negative for dizziness, tremors, seizures, syncope, speech difficulty, weakness and numbness.   Hematological:  Bruises/bleeds easily (asa81).   Psychiatric/Behavioral:  Negative for confusion, decreased concentration and sleep disturbance.     I have personally reviewed the MA's review of systems and made changes as necessary.       Objective   /68 (BP Location: Right arm, Patient Position: Sitting, Cuff Size: Standard)   Pulse 62   Temp 98.6 °F (37 °C) (Temporal)   Resp 13   Ht 5' 10\" (1.778 m)   Wt 98 kg (216 lb)   SpO2 98%   BMI 30.99 kg/m²     Physical Exam  Neurological Exam  She is well appearing.  Affect is appropriate. Body mass index is 30.99 kg/m².. She is awake alert and oriented.  Hearing and vision are grossly intact.   Her pupils are equal round reactive to light.  Her extraocular movements are intact.  Her face is symmetric.  Tongue is midline.  Facial sensation is intact and symmetric throughout.  Shoulder shrug is 5/5.  There is no drift or " dysmetria.     She has full strength in her bilateral upper and lower extremities.  She has normal muscle tone muscle bulk.  Her biceps reflexes and patellar reflexes are 2+ and symmetric.  Sobeida sign negative bilaterally.  Sensation intact to light touch and pinprick throughout.  Her gait is normal.     Her heart rate is regular.  Normal respiratory effort.  Abdomen nondistended.  Radial pulses 2+.     We reviewed her arteriogram in detail and also reviewed her prior MRA in detail.  No evidence of dural AV fistula on invasive imaging.    Administrative Statements   I have spent a total time of 20 minutes in caring for this patient on the day of the visit/encounter including Diagnostic results, Prognosis, Risks and benefits of tx options, Instructions for management, Patient and family education, Importance of tx compliance, Risk factor reductions, Impressions, Counseling / Coordination of care, Documenting in the medical record, Reviewing / ordering tests, medicine, procedures  , and Obtaining or reviewing history  .

## 2025-01-15 NOTE — ASSESSMENT & PLAN NOTE
Has been having some migraine symptoms which are relieved with the Imitrex.  Discussed with her that this could be related to the recent arteriogram.  She plans on discussing this further with neurosurgery tomorrow.  Orders:  •  SUMAtriptan (IMITREX) 50 mg tablet; Take 1 tablet (50 mg total) by mouth once as needed for migraine  •  CBC and differential; Future

## 2025-01-22 ENCOUNTER — TELEPHONE (OUTPATIENT)
Age: 80
End: 2025-01-22

## 2025-01-22 ENCOUNTER — OFFICE VISIT (OUTPATIENT)
Dept: PODIATRY | Facility: CLINIC | Age: 80
End: 2025-01-22
Payer: MEDICARE

## 2025-01-22 VITALS
HEIGHT: 70 IN | TEMPERATURE: 97.9 F | RESPIRATION RATE: 16 BRPM | HEART RATE: 79 BPM | WEIGHT: 216 LBS | BODY MASS INDEX: 30.92 KG/M2 | OXYGEN SATURATION: 96 %

## 2025-01-22 DIAGNOSIS — E11.22 TYPE 2 DIABETES MELLITUS WITH STAGE 2 CHRONIC KIDNEY DISEASE, WITHOUT LONG-TERM CURRENT USE OF INSULIN  (HCC): Primary | ICD-10-CM

## 2025-01-22 DIAGNOSIS — N18.2 TYPE 2 DIABETES MELLITUS WITH STAGE 2 CHRONIC KIDNEY DISEASE, WITHOUT LONG-TERM CURRENT USE OF INSULIN  (HCC): Primary | ICD-10-CM

## 2025-01-22 DIAGNOSIS — L60.3 DYSTROPHIC NAIL: ICD-10-CM

## 2025-01-22 DIAGNOSIS — L85.3 XEROSIS OF SKIN: ICD-10-CM

## 2025-01-22 PROCEDURE — 11721 DEBRIDE NAIL 6 OR MORE: CPT | Performed by: PODIATRIST

## 2025-01-22 PROCEDURE — 99212 OFFICE O/P EST SF 10 MIN: CPT | Performed by: PODIATRIST

## 2025-01-22 NOTE — PROGRESS NOTES
Name: Enedelia Davis      : 1945      MRN: [id number]  Encounter Provider: Toya Lockwood DPM  Encounter Date: 2025   Encounter department: [facility] PODIATRY [place]  :  Assessment & Plan  Type 2 diabetes mellitus with stage 2 chronic kidney disease, without long-term current use of insulin  ([facility])    Lab Results   Component Value Date    HGBA1C 6.6 (H) 2025            Dystrophic nail         Xerosis of skin         IMPRESSION:  DM, A1c 6.4 2024  Nail dystrophy  Calluses  Painful nails B/L     PLAN:  I reviewed clinical exam with patient in detail today. I have discussed with the patient the pathophysiology of this diagnosis and reviewed how the examination correlates with this diagnosis.  Patient has significant lower extremity risk due to diminished pulses in the feet and trophic skin changes to the lower extremity including thick toenail, atrophic skin, and decreased hair growth.  Debridement of toenails x10. Using nail nipper, buffy, and curette, nails were sharply debrided, reduced in thickness and length. Devitalized nail tissue and fungal debris excised and removed. Patient tolerated well.    Bilateral calluses debrided to healthy tissue using electric bur submetatarsal 1  Patient has been adherent with use of AmLactin and other moisturizers which have improved her skin since last exam  Continue use of AmLactin as well as CeraVe a and Vaseline  Discussed proper shoe gear, daily inspections of feet, and general foot health with patient.   Patient has Q8  findings and is recommended for at risk foot care every 9-10 weeks.    History of Present Illness   HPI  Enedelia Davis is a 79 y.o. female who presents for further evaluation and management of at risk feet.  Her elongated nails are thickened and become painful.  Patient is still experiencing calluses submetatarsal 1 bilaterally.  She has been using ammonium lactate lotion as directed but is also using Vaseline and CeraVe which she  "feels helps her skin and lites overall.  Patient denies any other acute concerns      Review of Systems    Constitutional:  Negative for chills and fever.   Respiratory:  Negative for cough and shortness of breath.    Cardiovascular:  Positive for leg swelling.   Musculoskeletal:  Positive for arthralgias.   Skin:  Positive for color change.   Neurological: Negative.            Objective   Pulse 79   Temp 97.9 °F (36.6 °C) (Temporal)   Resp 16   Ht 5' 10\" (1.778 m)   Wt 98 kg (216 lb)   SpO2 96%   BMI 30.99 kg/m²      Physical Exam  Cardiovascular:      Comments: DP 1/4 BL  PT 0/4 BL  CRT less than 3 seconds  Diminished pedal hair growth  Skin temperature diminished from knees to digits bilaterally  Mild to moderate lower extremity edema left worse than right  Skin:     General: Skin is dry.      Capillary Refill: Capillary refill takes less than 2 seconds.      Findings: Lesion present.      Comments: Hyperkeratotic lesion submet 1 at today's visit.  Elongated thickened discolored toenails bilaterally.  Skin dry around first MPJ and posterior heels.  Nails painful with palpation due to elongation  Neurological:      Comments: Sensation intact          "

## 2025-01-22 NOTE — TELEPHONE ENCOUNTER
Patient states she recalls being told at recent office visit to take Imitrex and atorvastatin differently but received recent refills and instructions appear unchanged.     Please advise on both medications.

## 2025-03-17 ENCOUNTER — TELEPHONE (OUTPATIENT)
Dept: GYNECOLOGY | Facility: CLINIC | Age: 80
End: 2025-03-17

## 2025-03-17 NOTE — TELEPHONE ENCOUNTER
----- Message from GILBERTO Barber sent at 3/17/2025 12:06 PM EDT -----  Regarding: reason for visit  Pls call pt to determine reason for appt to make sure she is scheduled correctly

## 2025-03-18 ENCOUNTER — OFFICE VISIT (OUTPATIENT)
Dept: GYNECOLOGY | Facility: CLINIC | Age: 80
End: 2025-03-18
Payer: MEDICARE

## 2025-03-18 VITALS
SYSTOLIC BLOOD PRESSURE: 120 MMHG | HEART RATE: 71 BPM | HEIGHT: 70 IN | BODY MASS INDEX: 30.98 KG/M2 | WEIGHT: 216.4 LBS | DIASTOLIC BLOOD PRESSURE: 70 MMHG

## 2025-03-18 DIAGNOSIS — L90.0 LICHEN SCLEROSUS: Primary | ICD-10-CM

## 2025-03-18 DIAGNOSIS — N95.2 ATROPHIC VAGINITIS: ICD-10-CM

## 2025-03-18 PROCEDURE — 99213 OFFICE O/P EST LOW 20 MIN: CPT | Performed by: OBSTETRICS & GYNECOLOGY

## 2025-03-18 RX ORDER — ESTRADIOL 0.1 MG/G
CREAM VAGINAL
Qty: 42.5 G | Refills: 3 | Status: SHIPPED | OUTPATIENT
Start: 2025-03-18

## 2025-03-18 RX ORDER — CLOBETASOL PROPIONATE 0.5 MG/G
CREAM TOPICAL WEEKLY
Qty: 45 G | Refills: 1 | Status: SHIPPED | OUTPATIENT
Start: 2025-03-18

## 2025-03-18 NOTE — ASSESSMENT & PLAN NOTE
LSA/risk of vulvar cancer reviewed. Pt to continue to use clobetasol in very thin layer once weekly and she prefers to use estrace cream 1 gm once weekly. She was advised to call with any flares.   Orders:    clobetasol (TEMOVATE) 0.05 % cream; Apply topically once a week Daily for 1 week and then once weekly

## 2025-03-18 NOTE — PROGRESS NOTES
"Name: Enedelia Davis      : 1945      MRN: [id number]  Encounter Provider: GILBERTO Adrian  Encounter Date: 3/18/2025   Encounter department: [facility]  :  Assessment & Plan  Lichen sclerosus    LSA/risk of vulvar cancer reviewed. Pt to continue to use clobetasol in very thin layer once weekly and she prefers to use estrace cream 1 gm once weekly. She was advised to call with any flares.   Orders:    clobetasol (TEMOVATE) 0.05 % cream; Apply topically once a week Daily for 1 week and then once weekly    Atrophic vaginitis    Orders:    estradiol (ESTRACE) 0.1 mg/g vaginal cream; 1 gram vaginally  weekly        History of Present Illness   Pt presents for 1 year LSA follow up.   Last seen 26 at which time she was advised to use clobetasol in very thin layer once weekly and continue estrace cream 1 gm once weekly. Pt states she is doing well and has had no flareups.   No other gyn concerns.           Review of Systems   Constitutional: Negative.    Respiratory: Negative.     Cardiovascular: Negative.    Gastrointestinal: Negative.    Endocrine: Negative.    Genitourinary:  Negative for dysuria, frequency, pelvic pain, urgency, vaginal bleeding, vaginal discharge and vaginal pain.   Musculoskeletal: Negative.    Skin: Negative.    Neurological: Negative.    Psychiatric/Behavioral: Negative.            Objective   /70 (BP Location: Left arm, Patient Position: Sitting, Cuff Size: Large)   Pulse 71   Ht 5' 10\" (1.778 m)   Wt 98.2 kg (216 lb 6.4 oz)   BMI 31.05 kg/m²      Physical Exam  Vitals and nursing note reviewed.   Constitutional:       Appearance: Normal appearance.   HENT:      Head: Normocephalic and atraumatic.   Pulmonary:      Effort: Pulmonary effort is normal.   Genitourinary:     Exam position: Supine.      Pubic Area: No rash.       Labia:         Right: No rash, tenderness or injury.         Left: No rash, tenderness or injury.       " Urethra: No urethral pain, urethral swelling or urethral lesion.      Vagina: No signs of injury and foreign body. No vaginal discharge, erythema, tenderness, bleeding or lesions.      Cervix: No cervical motion tenderness, discharge, friability, lesion, erythema, cervical bleeding or eversion.      Uterus: Not deviated, not enlarged, not fixed, not tender and no uterine prolapse.       Adnexa:         Right: No mass, tenderness or fullness.          Left: No mass, tenderness or fullness.        Comments: Agglutination of labia B/L, no ulcerations, no fissures  Exam limited by body habitus  Musculoskeletal:         General: Normal range of motion.      Cervical back: Normal range of motion.   Skin:     General: Skin is warm and dry.   Neurological:      Mental Status: She is alert and oriented to person, place, and time.   Psychiatric:         Mood and Affect: Mood normal.         Behavior: Behavior normal.         Thought Content: Thought content normal.         Judgment: Judgment normal.

## 2025-03-20 ENCOUNTER — HOSPITAL ENCOUNTER (OUTPATIENT)
Dept: RADIOLOGY | Facility: CLINIC | Age: 80
Discharge: HOME | End: 2025-03-20
Payer: MEDICARE

## 2025-03-20 VITALS — WEIGHT: 216 LBS | HEIGHT: 67 IN | BODY MASS INDEX: 33.9 KG/M2

## 2025-03-20 DIAGNOSIS — Z12.31 ENCOUNTER FOR SCREENING MAMMOGRAM FOR MALIGNANT NEOPLASM OF BREAST: ICD-10-CM

## 2025-03-20 PROCEDURE — 77067 SCR MAMMO BI INCL CAD: CPT

## 2025-03-20 PROCEDURE — 77063 BREAST TOMOSYNTHESIS BI: CPT

## 2025-03-28 ENCOUNTER — OFFICE VISIT (OUTPATIENT)
Dept: PODIATRY | Facility: CLINIC | Age: 80
End: 2025-03-28

## 2025-03-28 VITALS
OXYGEN SATURATION: 98 % | RESPIRATION RATE: 16 BRPM | TEMPERATURE: 97.7 F | HEART RATE: 63 BPM | BODY MASS INDEX: 33.9 KG/M2 | HEIGHT: 67 IN | WEIGHT: 216 LBS

## 2025-03-28 DIAGNOSIS — B35.1 ONYCHOMYCOSIS: Primary | ICD-10-CM

## 2025-03-28 DIAGNOSIS — M79.675 PAIN IN TOES OF BOTH FEET: ICD-10-CM

## 2025-03-28 DIAGNOSIS — E11.22 CONTROLLED TYPE 2 DIABETES MELLITUS WITH STAGE 2 CHRONIC KIDNEY DISEASE, WITHOUT LONG-TERM CURRENT USE OF INSULIN  (HCC): ICD-10-CM

## 2025-03-28 DIAGNOSIS — M79.674 PAIN IN TOES OF BOTH FEET: ICD-10-CM

## 2025-03-28 DIAGNOSIS — N18.2 CONTROLLED TYPE 2 DIABETES MELLITUS WITH STAGE 2 CHRONIC KIDNEY DISEASE, WITHOUT LONG-TERM CURRENT USE OF INSULIN  (HCC): ICD-10-CM

## 2025-03-28 DIAGNOSIS — L85.3 XEROSIS OF SKIN: ICD-10-CM

## 2025-03-31 NOTE — PROGRESS NOTES
Enedelia Davis  1945  AT RISK FOOT CARE    1. Onychomycosis        2. Controlled type 2 diabetes mellitus with stage 2 chronic kidney disease, without long-term current use of insulin  ([facility])        3. Xerosis of skin        4. Pain in toes of both feet            Patient presents for at-risk foot care.  Patient has no acute concerns today.  Patient has significant lower extremity risk due to diminished pulses in the feet and trophic skin changes to the lower extremity including thick toenail, atrophic skin, and decreased hair growth.      On exam patient has thickened, hypertrophic, discolored, brittle toenails with subungual debris and tenderness x10     Patient has diminished pedal pulses and decreased perfusion to the lower extremities  Patient has significant trophic changes to the skin including thick toenails, decreased pedal hair and atrophic skin.     Today's treatment includes:  Debridement of toenails. Using nail nipper, buffy, and curette, nails were sharply debrided, reduced in thickness and length. Devitalized nail tissue and fungal debris excised and removed. Patient tolerated well.    Patient has been adherent with use of AmLactin and other moisturizers which have improved her skin since last exam  Continue use of AmLactin as well as CeraVe a and Vaseline  Discussed proper shoe gear, daily inspections of feet, and general foot health with patient.   Discussed proper shoe gear, daily inspections of feet, and general foot health with patient. Patient has Q8  findings and is recommended for at risk foot care every 9-10 weeks.    Patients most recent complete clinical foot exam was on: 1/22/25

## 2025-04-14 ENCOUNTER — TELEPHONE (OUTPATIENT)
Age: 80
End: 2025-04-14

## 2025-04-14 NOTE — TELEPHONE ENCOUNTER
Dr Carpenter chiropractor advised patient to contact pcp due to having pain in hip and pelvis. Suggested Xray of standing pelvis and left hip. Started about a week ago. Please advise.

## 2025-04-15 DIAGNOSIS — M25.552 LEFT HIP PAIN: Primary | ICD-10-CM

## 2025-04-16 ENCOUNTER — APPOINTMENT (OUTPATIENT)
Dept: RADIOLOGY | Facility: MEDICAL CENTER | Age: 80
End: 2025-04-16
Payer: MEDICARE

## 2025-04-16 DIAGNOSIS — M25.552 LEFT HIP PAIN: ICD-10-CM

## 2025-04-16 PROCEDURE — 73502 X-RAY EXAM HIP UNI 2-3 VIEWS: CPT

## 2025-04-30 ENCOUNTER — RESULTS FOLLOW-UP (OUTPATIENT)
Dept: INTERNAL MEDICINE CLINIC | Facility: CLINIC | Age: 80
End: 2025-04-30

## 2025-05-05 ENCOUNTER — NURSE TRIAGE (OUTPATIENT)
Age: 80
End: 2025-05-05

## 2025-05-05 DIAGNOSIS — L30.4 INTERTRIGO: ICD-10-CM

## 2025-05-05 DIAGNOSIS — L30.4 INTERTRIGO: Primary | ICD-10-CM

## 2025-05-05 RX ORDER — CLOTRIMAZOLE AND BETAMETHASONE DIPROPIONATE 10; .64 MG/G; MG/G
CREAM TOPICAL 2 TIMES DAILY
Qty: 45 G | Refills: 0 | Status: SHIPPED | OUTPATIENT
Start: 2025-05-05 | End: 2025-05-05 | Stop reason: SDUPTHER

## 2025-05-05 RX ORDER — CLOTRIMAZOLE AND BETAMETHASONE DIPROPIONATE 10; .64 MG/G; MG/G
CREAM TOPICAL 2 TIMES DAILY
Qty: 45 G | Refills: 0 | Status: SHIPPED | OUTPATIENT
Start: 2025-05-05 | End: 2025-05-19

## 2025-05-05 NOTE — TELEPHONE ENCOUNTER
"Answer Assessment - Initial Assessment Questions  1. NAME of MEDICINE: \"What medicine(s) are you calling about?\"      clotrimazole-betamethasone (LOTRISONE) 1-0.05 % cream  2. QUESTION: \"What is your question?\" (e.g., double dose of medicine, side effect)      Change pharmacy to [facility] in [place]  3. PRESCRIBER: \"Who prescribed the medicine?\" Reason: if prescribed by specialist, call should be referred to that group.      GILBERTO Barber    Protocols used: Medication Question Call-Adult-[place]    "

## 2025-05-05 NOTE — TELEPHONE ENCOUNTER
Patient returned call, reviewed provider recommendations. Patient verbalized understanding and is thankful.

## 2025-05-05 NOTE — TELEPHONE ENCOUNTER
"FOLLOW UP: message to provider for additional recommendations    REASON FOR CONVERSATION: Vaginal Itching    SYMPTOMS: vulvar itching, groin itching/redness    OTHER: patient reports she is having vaginal itching on the outside vulva and groin area.  She reports its a little worse than the lichen sclerosis itching she gets, and believes its from yeast.  She is asking for a cream to be prescribed.  She thinks there is some rash/redness in the area of her groin.  Denies pelvic pain, discharge and urinary symptoms.  Advised good genital hygiene such as mild unscented soaps to wash, cotton underwear, and the importance of staying clean and dry.  Will review with provider for additional recommendations.    DISPOSITION: Home Care    Reason for Disposition   Mild vaginal itching    Answer Assessment - Initial Assessment Questions  1. SYMPTOM: \"What's the main symptom you're concerned about?\" (e.g., pain, itching, dryness)      Itching on outside  2. LOCATION: \"Where is the  symptoms located?\" (e.g., inside/outside, left/right)      outside  3. ONSET: \"When did the  symptom  start?\"      A few days ago  4. PAIN: \"Is there any pain?\" If Yes, ask: \"How bad is it?\" (Scale: 1-10; mild, moderate, severe)      denies  5. ITCHING: \"Is there any itching?\" If Yes, ask: \"How bad is it?\" (Scale: 1-10; mild, moderate, severe)      moderate  6. CAUSE: \"What do you think is causing the discharge?\" \"Have you had the same problem before?\" \"What happened then?\"      ?external yeast  7. OTHER SYMPTOMS: \"Do you have any other symptoms?\" (e.g., fever, itching, vaginal bleeding, pain with urination, injury to genital area, vaginal foreign body)  denies    Protocols used: Vaginal Symptoms-Adult-[place]    "

## 2025-05-05 NOTE — TELEPHONE ENCOUNTER
"Patient called in, her med is not at the pharmacy.  Lotrisone was sent has \"phone in\". Order changed and sent to pharmacy requested per protocol.  "

## 2025-05-05 NOTE — TELEPHONE ENCOUNTER
Pls let pt know I sent in lotrisone cream that has both an antifungal and steroid to use twice daily for 2 weeks. She should not use any other steroid creams in this area while using lotrisone. If symptoms persist after 2 weeks, she will need to be seen.

## 2025-05-08 ENCOUNTER — APPOINTMENT (OUTPATIENT)
Dept: LAB | Facility: HOSPITAL | Age: 80
End: 2025-05-08
Payer: MEDICARE

## 2025-05-08 DIAGNOSIS — E78.2 MIXED HYPERLIPIDEMIA: ICD-10-CM

## 2025-05-08 DIAGNOSIS — J30.89 PERENNIAL ALLERGIC RHINITIS: ICD-10-CM

## 2025-05-08 DIAGNOSIS — G43.909 MIGRAINE WITHOUT STATUS MIGRAINOSUS, NOT INTRACTABLE, UNSPECIFIED MIGRAINE TYPE: ICD-10-CM

## 2025-05-08 DIAGNOSIS — D75.1 ERYTHROCYTOSIS: ICD-10-CM

## 2025-05-08 DIAGNOSIS — F34.1 PERSISTENT DEPRESSIVE DISORDER: ICD-10-CM

## 2025-05-08 DIAGNOSIS — E03.9 ACQUIRED HYPOTHYROIDISM: ICD-10-CM

## 2025-05-08 DIAGNOSIS — N18.2 TYPE 2 DIABETES MELLITUS WITH STAGE 2 CHRONIC KIDNEY DISEASE, WITHOUT LONG-TERM CURRENT USE OF INSULIN  (HCC): ICD-10-CM

## 2025-05-08 DIAGNOSIS — E11.22 TYPE 2 DIABETES MELLITUS WITH STAGE 2 CHRONIC KIDNEY DISEASE, WITHOUT LONG-TERM CURRENT USE OF INSULIN  (HCC): ICD-10-CM

## 2025-05-08 DIAGNOSIS — L90.0 LICHEN SCLEROSUS: ICD-10-CM

## 2025-05-08 DIAGNOSIS — E55.9 VITAMIN D DEFICIENCY: ICD-10-CM

## 2025-05-08 LAB
25(OH)D3 SERPL-MCNC: 53.2 NG/ML (ref 30–100)
ALBUMIN SERPL BCG-MCNC: 4.4 G/DL (ref 3.5–5)
ALP SERPL-CCNC: 51 U/L (ref 34–104)
ALT SERPL W P-5'-P-CCNC: 22 U/L (ref 7–52)
ANION GAP SERPL CALCULATED.3IONS-SCNC: 7 MMOL/L (ref 4–13)
AST SERPL W P-5'-P-CCNC: 20 U/L (ref 13–39)
BASOPHILS # BLD AUTO: 0.05 THOUSANDS/ÂΜL (ref 0–0.1)
BASOPHILS NFR BLD AUTO: 1 % (ref 0–1)
BILIRUB SERPL-MCNC: 0.54 MG/DL (ref 0.2–1)
BUN SERPL-MCNC: 13 MG/DL (ref 5–25)
CALCIUM SERPL-MCNC: 9.2 MG/DL (ref 8.4–10.2)
CHLORIDE SERPL-SCNC: 102 MMOL/L (ref 96–108)
CHOLEST SERPL-MCNC: 93 MG/DL (ref ?–200)
CO2 SERPL-SCNC: 30 MMOL/L (ref 21–32)
CREAT SERPL-MCNC: 0.79 MG/DL (ref 0.6–1.3)
EOSINOPHIL # BLD AUTO: 0.3 THOUSAND/ÂΜL (ref 0–0.61)
EOSINOPHIL NFR BLD AUTO: 5 % (ref 0–6)
ERYTHROCYTE [DISTWIDTH] IN BLOOD BY AUTOMATED COUNT: 13.4 % (ref 11.6–15.1)
EST. AVERAGE GLUCOSE BLD GHB EST-MCNC: 151 MG/DL
GFR SERPL CREATININE-BSD FRML MDRD: 71 ML/MIN/1.73SQ M
GLUCOSE P FAST SERPL-MCNC: 139 MG/DL (ref 65–99)
HBA1C MFR BLD: 6.9 %
HCT VFR BLD AUTO: 46.9 % (ref 34.8–46.1)
HDLC SERPL-MCNC: 38 MG/DL
HGB BLD-MCNC: 15.5 G/DL (ref 11.5–15.4)
IMM GRANULOCYTES # BLD AUTO: 0.01 THOUSAND/UL (ref 0–0.2)
IMM GRANULOCYTES NFR BLD AUTO: 0 % (ref 0–2)
LDLC SERPL CALC-MCNC: 34 MG/DL (ref 0–100)
LYMPHOCYTES # BLD AUTO: 2.04 THOUSANDS/ÂΜL (ref 0.6–4.47)
LYMPHOCYTES NFR BLD AUTO: 33 % (ref 14–44)
MCH RBC QN AUTO: 31.8 PG (ref 26.8–34.3)
MCHC RBC AUTO-ENTMCNC: 33 G/DL (ref 31.4–37.4)
MCV RBC AUTO: 96 FL (ref 82–98)
MONOCYTES # BLD AUTO: 0.46 THOUSAND/ÂΜL (ref 0.17–1.22)
MONOCYTES NFR BLD AUTO: 7 % (ref 4–12)
NEUTROPHILS # BLD AUTO: 3.36 THOUSANDS/ÂΜL (ref 1.85–7.62)
NEUTS SEG NFR BLD AUTO: 54 % (ref 43–75)
NONHDLC SERPL-MCNC: 55 MG/DL
NRBC BLD AUTO-RTO: 0 /100 WBCS
PLATELET # BLD AUTO: 202 THOUSANDS/UL (ref 149–390)
PMV BLD AUTO: 10.1 FL (ref 8.9–12.7)
POTASSIUM SERPL-SCNC: 3.8 MMOL/L (ref 3.5–5.3)
PROT SERPL-MCNC: 7 G/DL (ref 6.4–8.4)
RBC # BLD AUTO: 4.87 MILLION/UL (ref 3.81–5.12)
SODIUM SERPL-SCNC: 139 MMOL/L (ref 135–147)
TRIGL SERPL-MCNC: 104 MG/DL (ref ?–150)
TSH SERPL DL<=0.05 MIU/L-ACNC: 2.05 UIU/ML (ref 0.45–4.5)
WBC # BLD AUTO: 6.22 THOUSAND/UL (ref 4.31–10.16)

## 2025-05-08 PROCEDURE — 83036 HEMOGLOBIN GLYCOSYLATED A1C: CPT

## 2025-05-08 PROCEDURE — 36415 COLL VENOUS BLD VENIPUNCTURE: CPT

## 2025-05-08 PROCEDURE — 80053 COMPREHEN METABOLIC PANEL: CPT

## 2025-05-08 PROCEDURE — 85025 COMPLETE CBC W/AUTO DIFF WBC: CPT

## 2025-05-08 PROCEDURE — 84443 ASSAY THYROID STIM HORMONE: CPT

## 2025-05-08 PROCEDURE — 82306 VITAMIN D 25 HYDROXY: CPT

## 2025-05-08 PROCEDURE — 80061 LIPID PANEL: CPT

## 2025-05-13 ENCOUNTER — OFFICE VISIT (OUTPATIENT)
Dept: INTERNAL MEDICINE CLINIC | Facility: CLINIC | Age: 80
End: 2025-05-13
Payer: MEDICARE

## 2025-05-13 VITALS
WEIGHT: 216.7 LBS | HEART RATE: 62 BPM | BODY MASS INDEX: 34.01 KG/M2 | SYSTOLIC BLOOD PRESSURE: 132 MMHG | DIASTOLIC BLOOD PRESSURE: 78 MMHG | TEMPERATURE: 98 F | OXYGEN SATURATION: 96 % | HEIGHT: 67 IN

## 2025-05-13 DIAGNOSIS — Z00.00 MEDICARE ANNUAL WELLNESS VISIT, SUBSEQUENT: ICD-10-CM

## 2025-05-13 DIAGNOSIS — E55.9 VITAMIN D DEFICIENCY: ICD-10-CM

## 2025-05-13 DIAGNOSIS — L90.0 LICHEN SCLEROSUS: ICD-10-CM

## 2025-05-13 DIAGNOSIS — E11.22 TYPE 2 DIABETES MELLITUS WITH STAGE 2 CHRONIC KIDNEY DISEASE, WITHOUT LONG-TERM CURRENT USE OF INSULIN  (HCC): Primary | ICD-10-CM

## 2025-05-13 DIAGNOSIS — J30.89 PERENNIAL ALLERGIC RHINITIS: ICD-10-CM

## 2025-05-13 DIAGNOSIS — M54.2 CHRONIC NECK PAIN: ICD-10-CM

## 2025-05-13 DIAGNOSIS — E78.2 MIXED HYPERLIPIDEMIA: ICD-10-CM

## 2025-05-13 DIAGNOSIS — N18.31 STAGE 3A CHRONIC KIDNEY DISEASE (HCC): ICD-10-CM

## 2025-05-13 DIAGNOSIS — E03.9 ACQUIRED HYPOTHYROIDISM: ICD-10-CM

## 2025-05-13 DIAGNOSIS — M53.3 SACROCOCCYGEAL PAIN: ICD-10-CM

## 2025-05-13 DIAGNOSIS — N18.2 TYPE 2 DIABETES MELLITUS WITH STAGE 2 CHRONIC KIDNEY DISEASE, WITHOUT LONG-TERM CURRENT USE OF INSULIN  (HCC): Primary | ICD-10-CM

## 2025-05-13 DIAGNOSIS — I10 ESSENTIAL HYPERTENSION: ICD-10-CM

## 2025-05-13 DIAGNOSIS — G89.29 CHRONIC NECK PAIN: ICD-10-CM

## 2025-05-13 PROCEDURE — G0439 PPPS, SUBSEQ VISIT: HCPCS | Performed by: FAMILY MEDICINE

## 2025-05-13 PROCEDURE — G2211 COMPLEX E/M VISIT ADD ON: HCPCS | Performed by: FAMILY MEDICINE

## 2025-05-13 PROCEDURE — 99214 OFFICE O/P EST MOD 30 MIN: CPT | Performed by: FAMILY MEDICINE

## 2025-05-13 RX ORDER — AZELAIC ACID 0.15 G/G
GEL TOPICAL
COMMUNITY
Start: 2025-03-12

## 2025-05-13 RX ORDER — MELOXICAM 7.5 MG/1
7.5 TABLET ORAL DAILY
COMMUNITY

## 2025-05-13 NOTE — PROGRESS NOTES
Name: Enedelia Davis      : 1945      MRN: [id number]  Encounter Provider: Toya Cormier MD  Encounter Date: 2025   Encounter department: [facility]  :  Assessment & Plan  Type 2 diabetes mellitus with stage 2 chronic kidney disease, without long-term current use of insulin  ([facility])  Reviewed recent laboratory testing with her.  Hemoglobin A1c is slowly rising.  Continue with the bexagliflozin.  Discussed additional medications.  Will start her on low-dose Mounjaro.  Risks and benefits of this medication discussed.  Potential side effects discussed.  Watch carbohydrate intake.  Try to increase activity level.  Continue to follow-up with ophthalmology regularly.  Check feet daily.  Lab Results   Component Value Date    HGBA1C 6.9 (H) 2025       Orders:  •  Tirzepatide (Mounjaro) 2.5 MG/0.5ML SOAJ; Inject 2.5 mg under the skin once a week  •  CBC and differential; Future  •  Comprehensive metabolic panel; Future  •  Hemoglobin A1C; Future  •  Albumin / creatinine urine ratio    Acquired hypothyroidism  Continue current dose of levothyroxine.  Will continue to follow TSH with routine laboratory testing and adjust dosing if needed.  Orders:  •  CBC and differential; Future  •  TSH, 3rd generation; Future    Essential hypertension  Blood pressure currently well-controlled.  Continue with the lisinopril/hydrochlorothiazide.  Watch salt intake.  Stay adequately hydrated.  Orders:  •  CBC and differential; Future  •  Comprehensive metabolic panel; Future    Perennial allergic rhinitis  Allergy symptoms discussed.  She is not responding to the Allegra at this point.  Will switch her to Xyzal.  Watch for any worsening symptoms.  Consider nasal spray if symptoms persist or worsen.  Orders:  •  levocetirizine (XYZAL) 5 MG tablet; Take 1 tablet (5 mg total) by mouth every evening  •  CBC and differential; Future    Chronic neck pain  Chronic neck pain and stiffness.  Discussed  potential causes for this.  Will check an x-ray for further investigation.  Discussed with her that there is likely an arthritic component.  Orders:  •  XR spine cervical complete 4 or 5 vw non injury; Future  •  CBC and differential; Future    Sacrococcygeal pain  Chronic pain into the left sacral area and into the ischial area discussed.  Continue to follow-up with chiropractor.  Since this is chronic and since there is palpable tenderness and slight fullness over the left ischial area, will check an MRI for further investigation.  Orders:  •  MRI pelvis sacrum,coccyx, si jts wo and w contrast; Future  •  CBC and differential; Future    Mixed hyperlipidemia  Continue with the atorvastatin.  Watch diet.  Increase fiber in diet.  Orders:  •  CBC and differential; Future  •  Comprehensive metabolic panel; Future  •  Lipid panel; Future    Vitamin D deficiency  Continue with vitamin D supplementation.  Will continue to follow vitamin D level with routine laboratory testing and make recommendations thereafter.  Orders:  •  CBC and differential; Future  •  Vitamin D 25 hydroxy; Future    Lichen sclerosus  Continue to follow-up with GYN.  Continue with the clobetasol as per their recommendations.  Orders:  •  CBC and differential; Future    Stage 3a chronic kidney disease (HCC)  Creatinine and GFR have been relatively stable.  Currently have improved.  Lab Results   Component Value Date    EGFR 71 05/08/2025    EGFR 72 01/09/2025    EGFR 65 11/13/2024    CREATININE 0.79 05/08/2025    CREATININE 0.79 01/09/2025    CREATININE 0.86 11/13/2024       Orders:  •  CBC and differential; Future  •  Comprehensive metabolic panel; Future    Medicare annual wellness visit, subsequent  Orders and recommendations as noted above.  Continue with mammograms yearly.  Continue with bone densities every other year.  Up-to-date on immunizations.  Recommend flu shot this fall.         Depression Screening and Follow-up Plan: Patient was  screened for depression during today's encounter. They screened negative with a PHQ-2 score of 1.        Preventive health issues were discussed with patient, and age appropriate screening tests were ordered as noted in patient's After Visit Summary. Personalized health advice and appropriate referrals for health education or preventive services given if needed, as noted in patient's After Visit Summary.    History of Present Illness     She presents for routine follow-up as well as Medicare wellness visit.  Has numerous concerns.  She has noticed that her allergies are worse this year despite taking the Allegra.  She is wondering if she needs to change her antihistamine.  She continues to have pain into the very low back area and into the buttocks on the left.  Has followed up with the chiropractor.  Pain, however, persists and is limiting her activities.  Also having pain and stiffness into her neck.  Has noticed some creaking sounds when moving her neck at times.  Is concerned about her blood sugars.  She did notice that her hemoglobin A1c had increased.  She does admit that she has not been as active because of the back issues.  Has been trying to watch her diet.  Denies any significant polyuria, polydipsia, or polyphasia.  Tolerating her lisinopril/hydrochlorothiazide without difficulty.  Denies any headaches or localized weakness.  Denies any chest pain or palpitations.  Tolerating the atorvastatin well.  She has been following with GYN for the lichen sclerosis and plans to arrange a follow-up for them to check the area.       Patient Care Team:  Toya Cormier MD as PCP - General (Family Medicine)  Juancho Alejo DO as PCP - Endocrinology (Endocrinology)  Wolfgang Fu MD (Urology)  Viola Gupta PA-C (Vascular Surgery)  Juancho Alejo DO (Endocrinology)  Elizabeth Hernández DO (Nephrology)    Review of Systems   Constitutional:  Positive for activity change and fatigue. Negative for  appetite change, chills and fever.   HENT:  Positive for congestion and rhinorrhea.    Eyes:  Negative for visual disturbance.   Respiratory:  Negative for chest tightness and shortness of breath.    Cardiovascular:  Negative for chest pain and palpitations.   Gastrointestinal:  Negative for abdominal pain, blood in stool, diarrhea, nausea and vomiting.   Endocrine: Negative for polydipsia, polyphagia and polyuria.   Genitourinary:  Negative for dysuria, frequency and urgency.   Musculoskeletal:  Positive for arthralgias, back pain, gait problem, neck pain and neck stiffness.   Skin:  Negative for color change.   Allergic/Immunologic: Positive for environmental allergies.   Neurological:  Negative for dizziness and headaches.   Hematological:  Does not bruise/bleed easily.   Psychiatric/Behavioral:  Negative for confusion and sleep disturbance. The patient is not nervous/anxious.      Medical History Reviewed by provider this encounter:  Tobacco  Allergies  Meds  Problems  Med Hx  Surg Hx  Fam Hx       Annual Wellness Visit Questionnaire   Enedelia is here for her Subsequent Wellness visit. Last Medicare Wellness visit information reviewed, patient interviewed and updates made to the record today.      Health Risk Assessment:   Patient rates overall health as good. Patient feels that their physical health rating is slightly worse. Patient is very satisfied with their life. Eyesight was rated as same. Hearing was rated as same. Patient feels that their emotional and mental health rating is slightly worse. Patients states they are sometimes angry. Patient states they are never, rarely unusually tired/fatigued. Pain experienced in the last 7 days has been some. Patient's pain rating has been 5/10. Patient states that she has experienced no weight loss or gain in last 6 months.     Depression Screening:   PHQ-2 Score: 1      Fall Risk Screening:   In the past year, patient has experienced: no history of falling in  past year      Urinary Incontinence Screening:   Patient has not leaked urine accidently in the last six months.     Home Safety:  Patient does not have trouble with stairs inside or outside of their home. Patient has working smoke alarms and has working carbon monoxide detector. Home safety hazards include: none.     Nutrition:   Current diet is Regular.     Medications:   Patient is currently taking over-the-counter supplements. OTC medications include: see medication list. Patient is able to manage medications.     Activities of Daily Living (ADLs)/Instrumental Activities of Daily Living (IADLs):   Walk and transfer into and out of bed and chair?: Yes  Dress and groom yourself?: Yes    Bathe or shower yourself?: Yes    Feed yourself? Yes  Do your laundry/housekeeping?: Yes  Manage your money, pay your bills and track your expenses?: Yes  Make your own meals?: Yes    Do your own shopping?: Yes    Previous Hospitalizations:   Any hospitalizations or ED visits within the last 12 months?: No      Advance Care Planning:   Living will: Yes    Durable POA for healthcare: Yes    Advanced directive: Yes    Provider agrees with end of life decisions: Yes      Cognitive Screening:   Provider or family/friend/caregiver concerned regarding cognition?: No    Preventive Screenings      Cardiovascular Screening:    General: Screening Not Indicated and History Lipid Disorder      Diabetes Screening:     General: Screening Not Indicated and History Diabetes      Colorectal Cancer Screening:     General: Screening Current      Breast Cancer Screening:     General: Screening Current      Cervical Cancer Screening:    General: Screening Not Indicated      Osteoporosis Screening:    General: Screening Current      Abdominal Aortic Aneurysm (AAA) Screening:        General: Screening Not Indicated      Lung Cancer Screening:     General: Screening Not Indicated      Hepatitis C Screening:    General: Screening Not  "Indicated    Screening, Brief Intervention, and Referral to Treatment (SBIRT)     Screening  Typical number of drinks in a day: 0  Typical number of drinks in a week: 0  Interpretation: Low risk drinking behavior.    AUDIT-C Screenin) How often did you have a drink containing alcohol in the past year? never  2) How many drinks did you have on a typical day when you were drinking in the past year? 0  3) How often did you have 6 or more drinks on one occasion in the past year? never    AUDIT-C Score: 0  Interpretation: Score 0-2 (female): Negative screen for alcohol misuse    Single Item Drug Screening:  How often have you used an illegal drug (including marijuana) or a prescription medication for non-medical reasons in the past year? never    Single Item Drug Screen Score: 0  Interpretation: Negative screen for possible drug use disorder    Brief Intervention  Alcohol & drug use screenings were reviewed. No concerns regarding substance use disorder identified.     Social Drivers of Health     Food Insecurity: No Food Insecurity (2025)    Nursing - Inadequate Food Risk Classification    • Worried About Running Out of Food in the Last Year: Never true    • Ran Out of Food in the Last Year: Never true   Transportation Needs: No Transportation Needs (2025)    PRAPARE - Transportation    • Lack of Transportation (Medical): No    • Lack of Transportation (Non-Medical): No   Housing Stability: Low Risk  (2025)    Housing Stability Vital Sign    • Unable to Pay for Housing in the Last Year: No    • Number of Times Moved in the Last Year: 0    • Homeless in the Last Year: No   Utilities: Not At Risk (2025)    [facility] Utilities    • Threatened with loss of utilities: No     No results found.    Objective   /78 (BP Location: Left arm, Patient Position: Sitting, Cuff Size: Large)   Pulse 62   Temp 98 °F (36.7 °C)   Ht 5' 7\" (1.702 m)   Wt 98.3 kg (216 lb 11.2 oz)   SpO2 96%   BMI 33.94 kg/m² "     Physical Exam  Vitals and nursing note reviewed.   Constitutional:       General: She is not in acute distress.     Appearance: She is well-developed, well-groomed and overweight.   HENT:      Head: Normocephalic and atraumatic.      Comments: Boggy nasal mucosa with clear nasal discharge    Eyes:      General:         Right eye: No discharge.         Left eye: No discharge.      Conjunctiva/sclera: Conjunctivae normal.      Pupils: Pupils are equal, round, and reactive to light.       Cardiovascular:      Rate and Rhythm: Normal rate and regular rhythm.      Heart sounds: Normal heart sounds. No murmur heard.     No friction rub. No gallop.   Pulmonary:      Effort: No respiratory distress.      Breath sounds: No wheezing or rales.   Abdominal:      General: Bowel sounds are normal. There is no distension.      Tenderness: There is no abdominal tenderness.     Musculoskeletal:      Cervical back: Muscular tenderness present.      Comments: Tenderness over the left sacral area and into the ischial area with some palpable fullness   Lymphadenopathy:      Cervical: No cervical adenopathy.     Skin:     General: Skin is warm and dry.     Neurological:      Mental Status: She is alert and oriented to person, place, and time.     Psychiatric:         Mood and Affect: Mood and affect normal.         Speech: Speech normal.         Behavior: Behavior normal. Behavior is cooperative.         Cognition and Memory: Cognition and memory normal.

## 2025-05-14 ENCOUNTER — TELEPHONE (OUTPATIENT)
Age: 80
End: 2025-05-14

## 2025-05-14 PROBLEM — L71.8 ROSACEA CONJUNCTIVITIS: Status: ACTIVE | Noted: 2022-04-21

## 2025-05-14 PROBLEM — G89.29 CHRONIC NECK PAIN: Status: ACTIVE | Noted: 2025-05-14

## 2025-05-14 PROBLEM — H16.229 KERATOCONJUNCTIVITIS SICCA NOT DUE TO SJOGREN'S SYNDROME: Status: ACTIVE | Noted: 2022-04-21

## 2025-05-14 PROBLEM — H10.829 ROSACEA CONJUNCTIVITIS: Status: ACTIVE | Noted: 2022-04-21

## 2025-05-14 PROBLEM — M53.3 SACROCOCCYGEAL PAIN: Status: ACTIVE | Noted: 2025-05-14

## 2025-05-14 PROBLEM — M54.2 CHRONIC NECK PAIN: Status: ACTIVE | Noted: 2025-05-14

## 2025-05-14 RX ORDER — TIRZEPATIDE 2.5 MG/.5ML
2.5 INJECTION, SOLUTION SUBCUTANEOUS WEEKLY
Qty: 2 ML | Refills: 0 | Status: SHIPPED | OUTPATIENT
Start: 2025-05-14 | End: 2025-05-19 | Stop reason: SDUPTHER

## 2025-05-14 RX ORDER — LEVOCETIRIZINE DIHYDROCHLORIDE 5 MG/1
5 TABLET, FILM COATED ORAL EVERY EVENING
Qty: 90 TABLET | Refills: 1 | Status: SHIPPED | OUTPATIENT
Start: 2025-05-14

## 2025-05-14 NOTE — TELEPHONE ENCOUNTER
PA for (Mounjaro) 2.5 MG/0.5ML SUBMITTED to [employer]    via    []CMM-KEY:   [x]Surescripts-Case ID # [id number]  []Availity-Auth ID # NDC #   []Faxed to plan   []Other website   []Phone call Case ID #     []PA sent as URGENT    All office notes, labs and other pertaining documents and studies sent. Clinical questions answered. Awaiting determination from insurance company.     Turnaround time for your insurance to make a decision on your Prior Authorization can take 7-21 business days.

## 2025-05-14 NOTE — ASSESSMENT & PLAN NOTE
Continue current dose of levothyroxine.  Will continue to follow TSH with routine laboratory testing and adjust dosing if needed.  Orders:  •  CBC and differential; Future  •  TSH, 3rd generation; Future

## 2025-05-14 NOTE — ASSESSMENT & PLAN NOTE
Blood pressure currently well-controlled.  Continue with the lisinopril/hydrochlorothiazide.  Watch salt intake.  Stay adequately hydrated.  Orders:  •  CBC and differential; Future  •  Comprehensive metabolic panel; Future

## 2025-05-14 NOTE — ASSESSMENT & PLAN NOTE
Continue with vitamin D supplementation.  Will continue to follow vitamin D level with routine laboratory testing and make recommendations thereafter.  Orders:  •  CBC and differential; Future  •  Vitamin D 25 hydroxy; Future

## 2025-05-14 NOTE — ASSESSMENT & PLAN NOTE
Chronic pain into the left sacral area and into the ischial area discussed.  Continue to follow-up with chiropractor.  Since this is chronic and since there is palpable tenderness and slight fullness over the left ischial area, will check an MRI for further investigation.  Orders:  •  MRI pelvis sacrum,coccyx, si jts wo and w contrast; Future  •  CBC and differential; Future

## 2025-05-14 NOTE — ASSESSMENT & PLAN NOTE
Chronic neck pain and stiffness.  Discussed potential causes for this.  Will check an x-ray for further investigation.  Discussed with her that there is likely an arthritic component.  Orders:  •  XR spine cervical complete 4 or 5 vw non injury; Future  •  CBC and differential; Future

## 2025-05-14 NOTE — ASSESSMENT & PLAN NOTE
Continue to follow-up with GYN.  Continue with the clobetasol as per their recommendations.  Orders:  •  CBC and differential; Future

## 2025-05-14 NOTE — TELEPHONE ENCOUNTER
PA for (Mounjaro) 2.5 MG/0.5ML APPROVED     Date(s) approved May 14, 2025 to May 14, 2026     Case # [id number]     Patient advised by          [x]MyChart Message  []Phone call   [x]LMOM  []L/M to call office as no active Communication consent on file  []Unable to leave detailed message as VM not approved on Communication consent       Pharmacy advised by    [x]Fax  []Phone call  []Secure Chat    Specialty Pharmacy    []     Approval letter scanned into [employer] Yes

## 2025-05-14 NOTE — ASSESSMENT & PLAN NOTE
Continue with the atorvastatin.  Watch diet.  Increase fiber in diet.  Orders:  •  CBC and differential; Future  •  Comprehensive metabolic panel; Future  •  Lipid panel; Future

## 2025-05-14 NOTE — ASSESSMENT & PLAN NOTE
Reviewed recent laboratory testing with her.  Hemoglobin A1c is slowly rising.  Continue with the bexagliflozin.  Discussed additional medications.  Will start her on low-dose Mounjaro.  Risks and benefits of this medication discussed.  Potential side effects discussed.  Watch carbohydrate intake.  Try to increase activity level.  Continue to follow-up with ophthalmology regularly.  Check feet daily.  Lab Results   Component Value Date    HGBA1C 6.9 (H) 05/08/2025       Orders:  •  Tirzepatide (Mounjaro) 2.5 MG/0.5ML SOAJ; Inject 2.5 mg under the skin once a week  •  CBC and differential; Future  •  Comprehensive metabolic panel; Future  •  Hemoglobin A1C; Future  •  Albumin / creatinine urine ratio

## 2025-05-14 NOTE — PATIENT INSTRUCTIONS
Medicare Preventive Visit Patient Instructions  Thank you for completing your Welcome to Medicare Visit or Medicare Annual Wellness Visit today. Your next wellness visit will be due in one year (5/15/2026).  The screening/preventive services that you may require over the next 5-10 years are detailed below. Some tests may not apply to you based off risk factors and/or age. Screening tests ordered at today's visit but not completed yet may show as past due. Also, please note that scanned in results may not display below.  Preventive Screenings:  Service Recommendations Previous Testing/Comments   Colorectal Cancer Screening  * Colonoscopy    * Fecal Occult Blood Test (FOBT)/Fecal Immunochemical Test (FIT)  * Fecal DNA/Cologuard Test  * Flexible Sigmoidoscopy Age: 45-75 years old   Colonoscopy: every 10 years (may be performed more frequently if at higher risk)  OR  FOBT/FIT: every 1 year  OR  Cologuard: every 3 years  OR  Sigmoidoscopy: every 5 years  Screening may be recommended earlier than age 45 if at higher risk for colorectal cancer. Also, an individualized decision between you and your healthcare provider will decide whether screening between the ages of 76-85 would be appropriate. Colonoscopy: Not on file  FOBT/FIT: Not on file  Cologuard: Not on file  Sigmoidoscopy: Not on file          Breast Cancer Screening Age: 40+ years old  Frequency: every 1-2 years  Not required if history of left and right mastectomy Mammogram: 03/20/2025    Screening Current   Cervical Cancer Screening Between the ages of 21-29, pap smear recommended once every 3 years.   Between the ages of 30-65, can perform pap smear with HPV co-testing every 5 years.   Recommendations may differ for women with a history of total hysterectomy, cervical cancer, or abnormal pap smears in past. Pap Smear: 01/24/2024    Screening Not Indicated   Hepatitis C Screening Once for adults born between 1945 and 1965  More frequently in patients at high risk  for Hepatitis C Hep C Antibody: Not on file        Diabetes Screening 1-2 times per year if you're at risk for diabetes or have pre-diabetes Fasting glucose: 139 mg/dL (5/8/2025)  A1C: 6.9 % (5/8/2025)  Screening Not Indicated  History Diabetes   Cholesterol Screening Once every 5 years if you don't have a lipid disorder. May order more often based on risk factors. Lipid panel: 05/08/2025    Screening Not Indicated  History Lipid Disorder     Other Preventive Screenings Covered by Medicare:  Abdominal Aortic Aneurysm (AAA) Screening: covered once if your at risk. You're considered to be at risk if you have a family history of AAA.  Lung Cancer Screening: covers low dose CT scan once per year if you meet all of the following conditions: (1) Age 55-77; (2) No signs or symptoms of lung cancer; (3) Current smoker or have quit smoking within the last 15 years; (4) You have a tobacco smoking history of at least 20 pack years (packs per day multiplied by number of years you smoked); (5) You get a written order from a healthcare provider.  Glaucoma Screening: covered annually if you're considered high risk: (1) You have diabetes OR (2) Family history of glaucoma OR (3)  aged 50 and older OR (4)  American aged 65 and older  Osteoporosis Screening: covered every 2 years if you meet one of the following conditions: (1) You're estrogen deficient and at risk for osteoporosis based off medical history and other findings; (2) Have a vertebral abnormality; (3) On glucocorticoid therapy for more than 3 months; (4) Have primary hyperparathyroidism; (5) On osteoporosis medications and need to assess response to drug therapy.   Last bone density test (DXA Scan): 03/19/2024.  HIV Screening: covered annually if you're between the age of 15-65. Also covered annually if you are younger than 15 and older than 65 with risk factors for HIV infection. For pregnant patients, it is covered up to 3 times per  pregnancy.    Immunizations:  Immunization Recommendations   Influenza Vaccine Annual influenza vaccination during flu season is recommended for all persons aged >= 6 months who do not have contraindications   Pneumococcal Vaccine   * Pneumococcal conjugate vaccine = PCV13 (Prevnar 13), PCV15 (Vaxneuvance), PCV20 (Prevnar 20)  * Pneumococcal polysaccharide vaccine = PPSV23 (Pneumovax) Adults 19-65 yo with certain risk factors or if 65+ yo  If never received any pneumonia vaccine: recommend Prevnar 20 (PCV20)  Give PCV20 if previously received 1 dose of PCV13 or PPSV23   Hepatitis B Vaccine 3 dose series if at intermediate or high risk (ex: diabetes, end stage renal disease, liver disease)   Respiratory syncytial virus (RSV) Vaccine - COVERED BY MEDICARE PART D  * RSVPreF3 (Arexvy) CDC recommends that adults 60 years of age and older may receive a single dose of RSV vaccine using shared clinical decision-making (SCDM)   Tetanus (Td) Vaccine - COST NOT COVERED BY MEDICARE PART B Following completion of primary series, a booster dose should be given every 10 years to maintain immunity against tetanus. Td may also be given as tetanus wound prophylaxis.   Tdap Vaccine - COST NOT COVERED BY MEDICARE PART B Recommended at least once for all adults. For pregnant patients, recommended with each pregnancy.   Shingles Vaccine (Shingrix) - COST NOT COVERED BY MEDICARE PART B  2 shot series recommended in those 19 years and older who have or will have weakened immune systems or those 50 years and older     Health Maintenance Due:      Topic Date Due   • Hepatitis C Screening  Never done   • Breast Cancer Screening: Mammogram  03/20/2027     Immunizations Due:      Topic Date Due   • COVID-19 Vaccine (7 - 2024-25 season) 04/08/2025     Advance Directives   What are advance directives?  Advance directives are legal documents that state your wishes and plans for medical care. These plans are made ahead of time in case you lose your  ability to make decisions for yourself. Advance directives can apply to any medical decision, such as the treatments you want, and if you want to donate organs.   What are the types of advance directives?  There are many types of advance directives, and each state has rules about how to use them. You may choose a combination of any of the following:  Living will:  This is a written record of the treatment you want. You can also choose which treatments you do not want, which to limit, and which to stop at a certain time. This includes surgery, medicine, IV fluid, and tube feedings.   Durable power of  for healthcare (DPAHC):  This is a written record that states who you want to make healthcare choices for you when you are unable to make them for yourself. This person, called a proxy, is usually a family member or a friend. You may choose more than 1 proxy.  Do not resuscitate (DNR) order:  A DNR order is used in case your heart stops beating or you stop breathing. It is a request not to have certain forms of treatment, such as CPR. A DNR order may be included in other types of advance directives.  Medical directive:  This covers the care that you want if you are in a coma, near death, or unable to make decisions for yourself. You can list the treatments you want for each condition. Treatment may include pain medicine, surgery, blood transfusions, dialysis, IV or tube feedings, and a ventilator (breathing machine).  Values history:  This document has questions about your views, beliefs, and how you feel and think about life. This information can help others choose the care that you would choose.  Why are advance directives important?  An advance directive helps you control your care. Although spoken wishes may be used, it is better to have your wishes written down. Spoken wishes can be misunderstood, or not followed. Treatments may be given even if you do not want them. An advance directive may make it easier  for your family to make difficult choices about your care.   Weight Management   Why it is important to manage your weight:  Being overweight increases your risk of health conditions such as heart disease, high blood pressure, type 2 diabetes, and certain types of cancer. It can also increase your risk for osteoarthritis, sleep apnea, and other respiratory problems. Aim for a slow, steady weight loss. Even a small amount of weight loss can lower your risk of health problems.  How to lose weight safely:  A safe and healthy way to lose weight is to eat fewer calories and get regular exercise. You can lose up about 1 pound a week by decreasing the number of calories you eat by 500 calories each day.   Healthy meal plan for weight management:  A healthy meal plan includes a variety of foods, contains fewer calories, and helps you stay healthy. A healthy meal plan includes the following:  Eat whole-grain foods more often.  A healthy meal plan should contain fiber. Fiber is the part of grains, fruits, and vegetables that is not broken down by your body. Whole-grain foods are healthy and provide extra fiber in your diet. Some examples of whole-grain foods are whole-wheat breads and pastas, oatmeal, brown rice, and bulgur.  Eat a variety of vegetables every day.  Include dark, leafy greens such as spinach, kale, tay greens, and mustard greens. Eat yellow and orange vegetables such as carrots, sweet potatoes, and winter squash.   Eat a variety of fruits every day.  Choose fresh or canned fruit (canned in its own juice or light syrup) instead of juice. Fruit juice has very little or no fiber.  Eat low-fat dairy foods.  Drink fat-free (skim) milk or 1% milk. Eat fat-free yogurt and low-fat cottage cheese. Try low-fat cheeses such as mozzarella and other reduced-fat cheeses.  Choose meat and other protein foods that are low in fat.  Choose beans or other legumes such as split peas or lentils. Choose fish, skinless poultry  (chicken or turkey), or lean cuts of red meat (beef or pork). Before you cook meat or poultry, cut off any visible fat.   Use less fat and oil.  Try baking foods instead of frying them. Add less fat, such as margarine, sour cream, regular salad dressing and mayonnaise to foods. Eat fewer high-fat foods. Some examples of high-fat foods include french fries, doughnuts, ice cream, and cakes.  Eat fewer sweets.  Limit foods and drinks that are high in sugar. This includes candy, cookies, regular soda, and sweetened drinks.  Exercise:  Exercise at least 30 minutes per day on most days of the week. Some examples of exercise include walking, biking, dancing, and swimming. You can also fit in more physical activity by taking the stairs instead of the elevator or parking farther away from stores. Ask your healthcare provider about the best exercise plan for you.    © Copyright [employer] 2018 Information is for End User's use only and may not be sold, redistributed or otherwise used for commercial purposes. All illustrations and images included in CareNotes® are the copyrighted property of [employer] Inc. or [employer]

## 2025-05-14 NOTE — ASSESSMENT & PLAN NOTE
Allergy symptoms discussed.  She is not responding to the Allegra at this point.  Will switch her to Xyzal.  Watch for any worsening symptoms.  Consider nasal spray if symptoms persist or worsen.  Orders:  •  levocetirizine (XYZAL) 5 MG tablet; Take 1 tablet (5 mg total) by mouth every evening  •  CBC and differential; Future

## 2025-05-15 ENCOUNTER — NURSE TRIAGE (OUTPATIENT)
Age: 80
End: 2025-05-15

## 2025-05-15 ENCOUNTER — APPOINTMENT (OUTPATIENT)
Dept: LAB | Facility: HOSPITAL | Age: 80
End: 2025-05-15
Payer: MEDICARE

## 2025-05-15 ENCOUNTER — RESULTS FOLLOW-UP (OUTPATIENT)
Dept: ENDOCRINOLOGY | Facility: CLINIC | Age: 80
End: 2025-05-15

## 2025-05-15 ENCOUNTER — HOSPITAL ENCOUNTER (OUTPATIENT)
Dept: RADIOLOGY | Facility: HOSPITAL | Age: 80
Discharge: HOME | End: 2025-05-15
Attending: FAMILY MEDICINE
Payer: MEDICARE

## 2025-05-15 DIAGNOSIS — M54.2 CHRONIC NECK PAIN: ICD-10-CM

## 2025-05-15 DIAGNOSIS — G89.29 CHRONIC NECK PAIN: ICD-10-CM

## 2025-05-15 DIAGNOSIS — N18.2 TYPE 2 DIABETES MELLITUS WITH STAGE 2 CHRONIC KIDNEY DISEASE, WITHOUT LONG-TERM CURRENT USE OF INSULIN  (HCC): ICD-10-CM

## 2025-05-15 DIAGNOSIS — E11.22 TYPE 2 DIABETES MELLITUS WITH STAGE 2 CHRONIC KIDNEY DISEASE, WITHOUT LONG-TERM CURRENT USE OF INSULIN  (HCC): ICD-10-CM

## 2025-05-15 LAB
ALBUMIN SERPL BCG-MCNC: 4.5 G/DL (ref 3.5–5)
ALP SERPL-CCNC: 51 U/L (ref 34–104)
ALT SERPL W P-5'-P-CCNC: 23 U/L (ref 7–52)
ANION GAP SERPL CALCULATED.3IONS-SCNC: 7 MMOL/L (ref 4–13)
AST SERPL W P-5'-P-CCNC: 19 U/L (ref 13–39)
BILIRUB SERPL-MCNC: 0.77 MG/DL (ref 0.2–1)
BUN SERPL-MCNC: 18 MG/DL (ref 5–25)
CALCIUM SERPL-MCNC: 9.5 MG/DL (ref 8.4–10.2)
CHLORIDE SERPL-SCNC: 102 MMOL/L (ref 96–108)
CHOLEST SERPL-MCNC: 105 MG/DL (ref ?–200)
CO2 SERPL-SCNC: 29 MMOL/L (ref 21–32)
CREAT SERPL-MCNC: 0.9 MG/DL (ref 0.6–1.3)
CREAT UR-MCNC: 33.4 MG/DL
EST. AVERAGE GLUCOSE BLD GHB EST-MCNC: 151 MG/DL
GFR SERPL CREATININE-BSD FRML MDRD: 61 ML/MIN/1.73SQ M
GLUCOSE P FAST SERPL-MCNC: 148 MG/DL (ref 65–99)
HBA1C MFR BLD: 6.9 %
HDLC SERPL-MCNC: 39 MG/DL
LDLC SERPL CALC-MCNC: 44 MG/DL (ref 0–100)
MICROALBUMIN UR-MCNC: 8.1 MG/L
MICROALBUMIN/CREAT 24H UR: 24 MG/G CREATININE (ref 0–30)
POTASSIUM SERPL-SCNC: 4 MMOL/L (ref 3.5–5.3)
PROT SERPL-MCNC: 7 G/DL (ref 6.4–8.4)
SODIUM SERPL-SCNC: 138 MMOL/L (ref 135–147)
TRIGL SERPL-MCNC: 108 MG/DL (ref ?–150)

## 2025-05-15 PROCEDURE — 80053 COMPREHEN METABOLIC PANEL: CPT

## 2025-05-15 PROCEDURE — 80061 LIPID PANEL: CPT

## 2025-05-15 PROCEDURE — 72050 X-RAY EXAM NECK SPINE 4/5VWS: CPT

## 2025-05-15 PROCEDURE — 36415 COLL VENOUS BLD VENIPUNCTURE: CPT

## 2025-05-15 PROCEDURE — 82043 UR ALBUMIN QUANTITATIVE: CPT

## 2025-05-15 PROCEDURE — 82570 ASSAY OF URINE CREATININE: CPT

## 2025-05-15 PROCEDURE — 83036 HEMOGLOBIN GLYCOSYLATED A1C: CPT

## 2025-05-15 NOTE — TELEPHONE ENCOUNTER
"FOLLOW UP: n/a    REASON FOR CONVERSATION: Rash    SYMPTOMS: Reddened vulva and lump    OTHER: Patient started Lotrisone cream BID on 5/5 for concerns of itching and burning related to lichen flare. States those symptoms have completely resolved, but notes a red area along with a very small bump. Denies pain or open areas. Scheduled for visit for eval. Care advice given for genital hygiene and reviewed when to call back. Patient verbalized understanding    DISPOSITION: Next Available Appointment with Provider (overriding See Today or Tomorrow in Office)  Reason for Disposition   Rash (e.g., redness, tiny bumps, sore) of genital area present > 24 hours    Answer Assessment - Initial Assessment Questions  1. SYMPTOM: \"What's the main symptom you're concerned about?\" (e.g., rash, itching, swelling, dryness)      Redness   2. LOCATION: \"Where is the redness located?\" (e.g., inside/outside, left/right)      Inner labia  3. ONSET: \"When did the  symptoms  start?\"      About over a week ago  4. PAIN: \"Is there any pain?\" If Yes, ask: \"How bad is it?\" (Scale: 1-10; mild, moderate, severe)      denies  5. CAUSE: \"What do you think is causing the symptoms?\"      unsure  6. OTHER SYMPTOMS: \"Do you have any other symptoms?\" (e.g., fever, vaginal bleeding, pain with urination)      denies    Protocols used: Vulvar Symptoms-Adult-[place]    "

## 2025-05-16 ENCOUNTER — RESULTS FOLLOW-UP (OUTPATIENT)
Dept: INTERNAL MEDICINE CLINIC | Facility: CLINIC | Age: 80
End: 2025-05-16

## 2025-05-16 NOTE — TELEPHONE ENCOUNTER
Patient states with insurance price is still over $400. Patient is wondering if there is anything can be recommended, either an alternative or a way to get the price down. Patient requests call back to advise.

## 2025-05-19 ENCOUNTER — OFFICE VISIT (OUTPATIENT)
Dept: ENDOCRINOLOGY | Facility: CLINIC | Age: 80
End: 2025-05-19
Payer: MEDICARE

## 2025-05-19 VITALS
HEIGHT: 67 IN | DIASTOLIC BLOOD PRESSURE: 76 MMHG | WEIGHT: 214 LBS | TEMPERATURE: 97.6 F | OXYGEN SATURATION: 97 % | BODY MASS INDEX: 33.59 KG/M2 | SYSTOLIC BLOOD PRESSURE: 142 MMHG | HEART RATE: 74 BPM

## 2025-05-19 DIAGNOSIS — N18.31 STAGE 3A CHRONIC KIDNEY DISEASE (HCC): Primary | ICD-10-CM

## 2025-05-19 DIAGNOSIS — E11.22 TYPE 2 DIABETES MELLITUS WITH STAGE 2 CHRONIC KIDNEY DISEASE, WITHOUT LONG-TERM CURRENT USE OF INSULIN  (HCC): ICD-10-CM

## 2025-05-19 DIAGNOSIS — N18.2 TYPE 2 DIABETES MELLITUS WITH STAGE 2 CHRONIC KIDNEY DISEASE, WITHOUT LONG-TERM CURRENT USE OF INSULIN  (HCC): ICD-10-CM

## 2025-05-19 DIAGNOSIS — I10 ESSENTIAL HYPERTENSION: ICD-10-CM

## 2025-05-19 DIAGNOSIS — E78.2 MIXED HYPERLIPIDEMIA: ICD-10-CM

## 2025-05-19 PROCEDURE — 99214 OFFICE O/P EST MOD 30 MIN: CPT | Performed by: PHYSICIAN ASSISTANT

## 2025-05-19 RX ORDER — TIRZEPATIDE 2.5 MG/.5ML
2.5 INJECTION, SOLUTION SUBCUTANEOUS WEEKLY
Qty: 2 ML | Refills: 0 | Status: SHIPPED | OUTPATIENT
Start: 2025-05-19

## 2025-05-19 NOTE — ASSESSMENT & PLAN NOTE
Lab Results   Component Value Date    HGBA1C 6.9 (H) 05/15/2025   Slightly worsening A1c, although still acceptable.     Would benefit from trial of GLP1 therapy - Mounjaro 2.5mg weekly x 4 weeks to start followed by 5mg weekly dose if well tolerated. Reviewed MOA and potential adverse effects. She will contact us with concerns.   To continue SGLT2i - Brenzavvy 20mg daily for now. May try to send in new Rx for Jardiance or Farxiga to compare cost/coverage, potential savings.     May continue to check BG 1-2 times daily at scattered times, to include 1-2 hour post-prandial values if possible.     Diet - would be helpful to pursue formal dietitian consult - orders placed, contact information provided.     Follow up - 4 months, with plan to collect in office A1c at that visit.       Orders:    Tirzepatide (Mounjaro) 2.5 MG/0.5ML SOAJ; Inject 2.5 mg under the skin once a week    Ambulatory referral to Diabetic Education; Future

## 2025-05-19 NOTE — ASSESSMENT & PLAN NOTE
Lab Results   Component Value Date    EGFR 61 05/15/2025    EGFR 71 05/08/2025    EGFR 72 01/09/2025    CREATININE 0.90 05/15/2025    CREATININE 0.79 05/08/2025    CREATININE 0.79 01/09/2025   With microalbuminuria, improved on SGLT2i, ACE-I.

## 2025-05-19 NOTE — ASSESSMENT & PLAN NOTE
BP above target. To continue current anti-hypertensive regimen, but may consider adjustment if BP remains above goal.. Patient will check scattered home BP measurements once weekly x several weeks, present to next PCP, endocrinology visit for review.

## 2025-05-19 NOTE — PROGRESS NOTES
Name: Enedelia Davis      : 1945      MRN: [id number]  Encounter Provider: Jose Carlos Ray PA-C  Encounter Date: 2025   Encounter department: [facility]    No chief complaint on file.  :  Assessment & Plan  Type 2 diabetes mellitus with stage 2 chronic kidney disease, without long-term current use of insulin  (HCC)    Lab Results   Component Value Date    HGBA1C 6.9 (H) 05/15/2025   Slightly worsening A1c, although still acceptable.     Would benefit from trial of GLP1 therapy - Mounjaro 2.5mg weekly x 4 weeks to start followed by 5mg weekly dose if well tolerated. Reviewed MOA and potential adverse effects. She will contact us with concerns.   To continue SGLT2i - Brenzavvy 20mg daily for now. May try to send in new Rx for Jardiance or Farxiga to compare cost/coverage, potential savings.     May continue to check BG 1-2 times daily at scattered times, to include 1-2 hour post-prandial values if possible.     Diet - would be helpful to pursue formal dietitian consult - orders placed, contact information provided.     Follow up - 4 months, with plan to collect in office A1c at that visit.       Orders:    Tirzepatide (Mounjaro) 2.5 MG/0.5ML SOAJ; Inject 2.5 mg under the skin once a week    Ambulatory referral to Diabetic Education; Future    Stage 3a chronic kidney disease (HCC)  Lab Results   Component Value Date    EGFR 61 05/15/2025    EGFR 71 2025    EGFR 72 2025    CREATININE 0.90 05/15/2025    CREATININE 0.79 2025    CREATININE 0.79 2025   With microalbuminuria, improved on SGLT2i, ACE-I.          Mixed hyperlipidemia  On statin.       Essential hypertension  BP above target. To continue current anti-hypertensive regimen, but may consider adjustment if BP remains above goal.. Patient will check scattered home BP measurements once weekly x several weeks, present to next PCP, endocrinology visit for review.            I have spent a  total time of 45 minutes in caring for this patient on the day of the visit/encounter including Diagnostic results, Risks and benefits of tx options, Instructions for management, Impressions, Counseling / Coordination of care, Documenting in the medical record, Reviewing/placing orders in the medical record (including tests, medications, and/or procedures), and Obtaining or reviewing history  .     History of Present Illness     Enedelia Davis is a very pleasant 80 y.o. female with type 2 DM here for routine follow up visit today.   Most recent endocrinology visit: 11/14/25 with Dr. Alejo - note reviewed. At that time no changes were made to her medication regimen, DM was well controlled.  DM complications - none known.     Enedelia is concerned about worsening BG control. Her A1c has been slowly increasing, however there have not been any changes in SMBG captured.  BG checks are once a day, scattered times.   Typically FBG - 110-130.  After-meal values are variable.   No BG values captured > 200, No BG values captured 70.     Hyperglycemia, hypoglycemia symptoms - none.    DM medication review:   Brenzavvy 20mg daily - questioning cost - would like to compare with other SGLT2i.    Has prior experience on Ozempic but didn't feel she had a signifcant BG control on 0.5mg weekly dose, and had a high copay, so discontinued.   Mounjaro 2.5mg weekly - just ordered by PCP at Medicare well visit. Copay was > $400 however, so did not start.   H/o metformin intolerance.     Dietary habits - celiac friendly. Admits to some indiscretion, about 1 treat a day. Mindful of carbohydrate choices, measuring portion sizes. Beverage of choice - water, arnold marie - iced tea is sugar free, but lemonade is not - has about a glass / day.  Dietitian visits - none recently.     DM foot exams - sees Dr. Lockwood (podiatry) routinely.  DM eye exams - h/o dry eye, sees Dr. Jimenez. No h/o retinopathy.    For HTN, she takes an ACE-I. For  "hyperlipidemia, she takes a statin          Review of Systems as per HPI  Medical History Reviewed by provider this encounter:  Tobacco  Allergies  Meds  Problems  Med Hx  Surg Hx  Fam Hx     .       Medical History Reviewed by provider this encounter:     .    Objective   BP (!) 140/110 (BP Location: Left arm, Patient Position: Sitting)   Pulse 74   Temp 97.6 °F (36.4 °C)   Ht 5' 7\" (1.702 m)   Wt 97.1 kg (214 lb)   SpO2 97%   BMI 33.52 kg/m²      Body mass index is 33.52 kg/m².  Wt Readings from Last 3 Encounters:   05/19/25 97.1 kg (214 lb)   05/13/25 98.3 kg (216 lb 11.2 oz)   03/28/25 98 kg (216 lb)     Physical Exam  Vitals reviewed.   Constitutional:       General: She is not in acute distress.     Appearance: She is not ill-appearing.   HENT:      Head: Normocephalic.   Pulmonary:      Effort: No respiratory distress.     Neurological:      Mental Status: She is alert. Mental status is at baseline.     Psychiatric:         Mood and Affect: Mood normal.         Labs:   Lab Results   Component Value Date    HGBA1C 6.9 (H) 05/15/2025    HGBA1C 6.9 (H) 05/08/2025    HGBA1C 6.6 (H) 01/09/2025     Lab Results   Component Value Date    CREATININE 0.90 05/15/2025    CREATININE 0.79 05/08/2025    CREATININE 0.79 01/09/2025    BUN 18 05/15/2025    K 4.0 05/15/2025     05/15/2025    CO2 29 05/15/2025     eGFR   Date Value Ref Range Status   05/15/2025 61 ml/min/1.73sq m Final     Lab Results   Component Value Date    HDL 39 (L) 05/15/2025    TRIG 108 05/15/2025     Lab Results   Component Value Date    ALT 23 05/15/2025    AST 19 05/15/2025    ALKPHOS 51 05/15/2025     Lab Results   Component Value Date    KEW0JOIMSXNE 2.047 05/08/2025    IQH1TCARAFXO 1.594 01/09/2025    VPE2VMATUFAY 2.330 09/13/2024     Lab Results   Component Value Date    FREET4 0.91 01/09/2023       There are no Patient Instructions on file for this visit.    Discussed with the patient and all questioned fully answered. She will " call me if any problems arise.

## 2025-05-20 ENCOUNTER — OFFICE VISIT (OUTPATIENT)
Dept: GYNECOLOGY | Facility: CLINIC | Age: 80
End: 2025-05-20
Payer: MEDICARE

## 2025-05-20 ENCOUNTER — TELEPHONE (OUTPATIENT)
Age: 80
End: 2025-05-20

## 2025-05-20 VITALS — DIASTOLIC BLOOD PRESSURE: 70 MMHG | BODY MASS INDEX: 33.8 KG/M2 | WEIGHT: 215.8 LBS | SYSTOLIC BLOOD PRESSURE: 130 MMHG

## 2025-05-20 DIAGNOSIS — N18.2 CHRONIC KIDNEY DISEASE (CKD), STAGE II (MILD): ICD-10-CM

## 2025-05-20 DIAGNOSIS — N18.2 CHRONIC KIDNEY DISEASE (CKD), STAGE II (MILD): Primary | ICD-10-CM

## 2025-05-20 DIAGNOSIS — L30.4 INTERTRIGO: Primary | ICD-10-CM

## 2025-05-20 DIAGNOSIS — N18.2 TYPE 2 DIABETES MELLITUS WITH STAGE 2 CHRONIC KIDNEY DISEASE, WITHOUT LONG-TERM CURRENT USE OF INSULIN  (HCC): ICD-10-CM

## 2025-05-20 DIAGNOSIS — E11.22 TYPE 2 DIABETES MELLITUS WITH STAGE 2 CHRONIC KIDNEY DISEASE, WITHOUT LONG-TERM CURRENT USE OF INSULIN  (HCC): ICD-10-CM

## 2025-05-20 DIAGNOSIS — N89.8 VAGINAL DISCHARGE: ICD-10-CM

## 2025-05-20 PROCEDURE — 87660 TRICHOMONAS VAGIN DIR PROBE: CPT | Performed by: OBSTETRICS & GYNECOLOGY

## 2025-05-20 PROCEDURE — 99213 OFFICE O/P EST LOW 20 MIN: CPT | Performed by: OBSTETRICS & GYNECOLOGY

## 2025-05-20 PROCEDURE — 87480 CANDIDA DNA DIR PROBE: CPT | Performed by: OBSTETRICS & GYNECOLOGY

## 2025-05-20 PROCEDURE — 87510 GARDNER VAG DNA DIR PROBE: CPT | Performed by: OBSTETRICS & GYNECOLOGY

## 2025-05-20 RX ORDER — FLUCONAZOLE 150 MG/1
150 TABLET ORAL ONCE
Qty: 4 TABLET | Refills: 0 | Status: SHIPPED | OUTPATIENT
Start: 2025-05-20 | End: 2025-05-20

## 2025-05-20 NOTE — TELEPHONE ENCOUNTER
Would suggest HOLD bexagliflozin for now. May consider alternative medication in this class in future, but drug holiday is reasonable for now to ensure resolution of  infection.    I suggest we proceed with new Rx for Mounjaro - we may be able to control blood sugars with this medication alone.

## 2025-05-20 NOTE — PROGRESS NOTES
Name: Enedelia Davis      : 1945      MRN: [id number]  Encounter Provider: GILBERTO Adrian  Encounter Date: 2025   Encounter department: [facility]  :  Assessment & Plan  Intertrigo  Will treat systemically at this point as topical treatments have been ineffective. Discussed the importance of blood sugar control and lower carbohydrates in diet to help prevent future symptoms. Pt also advised on use of Zeosorb powder for prevention. Will RTO if sx persist, worsen.   Orders:    fluconazole (DIFLUCAN) 150 mg tablet; Take 1 tablet (150 mg total) by mouth once for 1 dose Take day 1, 4,7,10    Vaginal discharge  Vaginal culture sent.        Type 2 diabetes mellitus with stage 2 chronic kidney disease, without long-term current use of insulin  ([facility])    Lab Results   Component Value Date    HGBA1C 6.9 (H) 05/15/2025                History of Present Illness   Pt presents with vulvar redness, itching.   She has a hx of LSA and is on clobetasol once weekly as well as estrace vaginal cream.   She is also a diabetic on Brenzavvy. Hgb A1c continues to increase. She is scheduled with a nutritionist.   She was treated 2 weeks ago with intertrigo with lotrisone cream.           Review of Systems   Constitutional: Negative.    Respiratory: Negative.     Cardiovascular: Negative.    Gastrointestinal: Negative.    Endocrine: Negative.    Genitourinary:  Negative for dysuria, frequency, pelvic pain, urgency, vaginal bleeding, vaginal discharge and vaginal pain.   Musculoskeletal: Negative.    Skin: Negative.    Neurological: Negative.    Psychiatric/Behavioral: Negative.            Objective   /70 (BP Location: Right arm, Patient Position: Sitting, Cuff Size: Large)   Wt 97.9 kg (215 lb 12.8 oz)   BMI 33.80 kg/m²      Physical Exam  Vitals and nursing note reviewed. Exam conducted with a chaperone present.   Constitutional:       Appearance: Normal appearance.   HENT:       Head: Normocephalic and atraumatic.   Pulmonary:      Effort: Pulmonary effort is normal.   Genitourinary:     Pubic Area: Rash (erythema consistent with yeast in left groin) present.      Labia:         Right: No rash, tenderness, lesion or injury.         Left: No rash, tenderness, lesion or injury.       Urethra: No urethral pain, urethral swelling or urethral lesion.      Vagina: No signs of injury and foreign body. Vaginal discharge (moderate amount white d/c) present. No erythema, tenderness, bleeding, lesions or prolapsed vaginal walls.     Musculoskeletal:         General: Normal range of motion.      Cervical back: Normal range of motion.     Skin:     General: Skin is warm and dry.     Neurological:      Mental Status: She is alert and oriented to person, place, and time.     Psychiatric:         Mood and Affect: Mood normal.         Behavior: Behavior normal.         Thought Content: Thought content normal.         Judgment: Judgment normal.

## 2025-05-20 NOTE — TELEPHONE ENCOUNTER
Spoke to patient she will stop Brenzavvy and is picking up Mounjaro today will keep us posted on blood sugars

## 2025-05-20 NOTE — TELEPHONE ENCOUNTER
Patient is waiting for referral for MRI to be placed so she can call to schedule.  Please let her know when the referral is placed.  Thank you.

## 2025-05-20 NOTE — TELEPHONE ENCOUNTER
Patient states she saw gynecologist and has a yeast infection. States at last OV she spoke with provider regarding switching bexagliflozin to avoid yeast infections. Patient asking for an alternative medication. Please advise, thank you.

## 2025-05-20 NOTE — TELEPHONE ENCOUNTER
Pt called in stating she just had an appointment where she was given information on a powder to use. She cannot find that information now. Reviewed office note which recommends using Zeosorb powder. Pt verbalized understanding and is thankful.

## 2025-05-21 ENCOUNTER — CONSULT (OUTPATIENT)
Dept: DIABETES SERVICES | Facility: CLINIC | Age: 80
End: 2025-05-21
Payer: MEDICARE

## 2025-05-21 ENCOUNTER — TELEPHONE (OUTPATIENT)
Dept: ENDOCRINOLOGY | Facility: CLINIC | Age: 80
End: 2025-05-21

## 2025-05-21 ENCOUNTER — RESULTS FOLLOW-UP (OUTPATIENT)
Dept: GYNECOLOGY | Facility: CLINIC | Age: 80
End: 2025-05-21

## 2025-05-21 VITALS — WEIGHT: 214.4 LBS | BODY MASS INDEX: 33.58 KG/M2

## 2025-05-21 DIAGNOSIS — M54.2 CHRONIC NECK PAIN: Primary | ICD-10-CM

## 2025-05-21 DIAGNOSIS — N18.2 TYPE 2 DIABETES MELLITUS WITH STAGE 2 CHRONIC KIDNEY DISEASE, WITHOUT LONG-TERM CURRENT USE OF INSULIN  (HCC): Primary | ICD-10-CM

## 2025-05-21 DIAGNOSIS — E11.22 TYPE 2 DIABETES MELLITUS WITH STAGE 2 CHRONIC KIDNEY DISEASE, WITHOUT LONG-TERM CURRENT USE OF INSULIN  (HCC): Primary | ICD-10-CM

## 2025-05-21 DIAGNOSIS — G89.29 CHRONIC NECK PAIN: Primary | ICD-10-CM

## 2025-05-21 LAB
CANDIDA RRNA VAG QL PROBE: NOT DETECTED
G VAGINALIS RRNA GENITAL QL PROBE: NOT DETECTED
T VAGINALIS RRNA GENITAL QL PROBE: NOT DETECTED

## 2025-05-21 PROCEDURE — 97802 MEDICAL NUTRITION INDIV IN: CPT

## 2025-05-21 NOTE — PROGRESS NOTES
Medical Nutrition Therapy        Assessment    Visit Type: Initial visit  Chief complaint/Medical Diagnosis/reason for visit E11.22, N18.2    HPI Enedelia was seen in person for the initial MNT appointment. Patient was pleasant and willing to share assessment information.  BG levels are checked 1x/day. FBG in the morning is usually 101, before lunch is 113, and before dinner is 84. Discussed pair testing meals. Patient does take diabetes medication as prescribed.     Patient reported a current exercise regimen of cardio and resistance by utilizing the [employer] program.  She also walks twice weekly for 2 miles each session.    Enedelia's diabetes management is complicated by other conditions including Celiac disease, CKD, and gout.    Problems identified in food recall include meal skipping, inconsistent carbohydrate intake, high-fat and high-sodium convenience foods, high-fat dairy, limited non-starchy vegetables and whole grains, sugary beverages, and sweets. Consumption of carbohydrates ranges from 30 to >75 grams per meal. Explained basic pathophysiology of diabetes and impact of diet on blood glucose levels and disease complications. Explained how sodium influences volume retention, blood pressure, and complications for heart disease, stroke, and CKD.     Provided patient with a 1285 calorie meal plan to assist with consistency, balance and portion control.  Encouraged the consumption of regular meals at regular times.  Advised patient to keep carbohydrate intake to 30 grams per meal and 15-20 per snack to assist with glycemic control.  Suggested keeping protein intake to 5 ounces a day and fat to 3 servings daily to assist with lipid management and calorie control. Portion booklet and food labels were used to teach basic carbohydrate counting. Patient agreed to keep daily food logs and return them in 2 months for assessment. RD will remain available for further dietary questions/concerns.     Ht Readings from  "Last 1 Encounters:   05/19/25 5' 7\" (1.702 m)     Wt Readings from Last 3 Encounters:   05/21/25 97.3 kg (214 lb 6.4 oz)   05/20/25 97.9 kg (215 lb 12.8 oz)   05/19/25 97.1 kg (214 lb)     Weight Change: No    Barriers to Learning: no barriers    Do you follow any special diet presently?: Yes - GF diet due to Celiac  Who shops: patient  Who cooks: patient    Food Log: Completed via the method of food recall    Wakes up 7-7:30 am    Breakfast:8:30 am; 1 c honey nut cheerios with 3/4 c whole milk and a banana OR 1/2 c oats made w/ water and 1 TBSP brown sugar OR egg sandwich (GF bread) with cheese and onion and sometimes Burkinan carlson  Morning Snack:grapes or apple or pear  Lunch:1 pm; 1 c GF pasta w/ ground meat (85/15) and 1/4 c sauce (regular) with 1-2 GF cookies with Arnold Morris SF tea mixed w/ regular lemonade OR grilled cheese sandwich with cheetos and fruit and tea/lemonade   Afternoon Snack: GF pretzels and cheese  Dinner:7 pm; hot dog (no bun, w/ ketchup) with 1 1/2 c fried potatoes and 1/3 c baked beans and 8 oz whole milk OR 3 slices pizza (GF; from 12 inch pie) with sweet sauce  Evening Snack:popcorn with water (used to be regular soda)  Beverages: Arnold Morris SF tea mixed w/ regular lemonade, water, whole milk, decaf tea  Eating out/Take out:3x/week (see above, pizza)  Exercise Mon/Wed/Fri has Silver Sneakers for an hour performing both cardio and resistance. Also 2x/week walks for 2 miles    Calorie needs 1285 kcals/day Carbs: 30 g/meal, 15-20 g/snack     Protein:5 ounces/day    Fat: 3 servings/day        Nutrition Diagnosis:  Food and nutrition related knowledge deficit  related to Lack of prior exposure to accurate nutrition related information as evidenced by Verbalizes inaccurate or incomplete information    Intervention: plate method, increased fiber intake, label reading, carbohydrate counting, increased plant based foods, meal timing, meal planning, exercise guidelines, and food diary "     Treatment Goals: Patient understands education and recommendations, Patient will monitor food intake daily with tracker, Patient will consume 3 meals a day, Patient will increase their intake of plant based foods, Patient will count carbohydrates, Patient will exercise, and Patient will monitor blood glucose    Monitoring and evaluation:    Term code indicator  FH 1.3.2 Food Intake Criteria: Eat 3 meals per day, 4-5 hours apart. No meal skipping. Eat your snack 2-3 hours away from your meals.  Term code indicator  FH 1.6.3 Carbohydrate Intake Criteria: Eat 30 grams of carbohydrate per meal, and no more than 15-20 grams per snack.  Term code indicator  CH 2.2 Treatments/Therapy/Alternative Medicine Criteria: Continue exercise as able.    Materials Provided: portion book, 3-day food log    Patient’s Response to Instruction:  Comprehensiongood  Motivationgood  Expected Compliancegood    Begin Time: 8:25 am  End Time: 9:35 am  Referring Provider: Jose Carlos Ray PA-C    Thank you for coming to the [facility] for education today.  Please feel free to call with any questions or concerns.    Lakia Walters, RD  [address] [place] [address]  [phone]

## 2025-05-22 ENCOUNTER — TELEPHONE (OUTPATIENT)
Age: 80
End: 2025-05-22

## 2025-05-22 NOTE — TELEPHONE ENCOUNTER
PA for Mounjaro 2.5mg SUBMITTED to [facility]    via    []CMM-KEY:   [x]Surescripts-Case ID # [id number]   []Availity-Auth ID # NDC #   []Faxed to plan   []Other website   []Phone call Case ID #     [x]PA sent as URGENT    All office notes, labs and other pertaining documents and studies sent. Clinical questions answered. Awaiting determination from insurance company.     Turnaround time for your insurance to make a decision on your Prior Authorization can take 7-21 business days.

## 2025-05-22 NOTE — TELEPHONE ENCOUNTER
Patient called in needing assistance to reschedule her MRI so they weren't back to back. Triage nurse added the MRI scheduling line to the call and patient had appts moved around as per her request.

## 2025-05-22 NOTE — TELEPHONE ENCOUNTER
Pt calling in stating that she was using Lotrisone cream and is requesting to know how long she should use the cream for, pt is notified it was prescribed for 2 weeks (14days), pt stating that she will stop the cream, and would like to know if she should return to using Clobetasol cream once weekly. Pt is notified a message will be sent to the provider, pt verbalized understanding.

## 2025-05-22 NOTE — TELEPHONE ENCOUNTER
[facility]  prior authorization dept called.  The pts sandra has been approved  they are faxing over the letter confirming.

## 2025-05-23 NOTE — TELEPHONE ENCOUNTER
PA for Mounjaro 2.5mg APPROVED     Date(s) approved 05/14/2025-05/14/2026    Patient advised by      Picked up from pharmacy    [x]MyChart Message  []Phone call   []LMOM  []L/M to call office as no active Communication consent on file  []Unable to leave detailed message as VM not approved on Communication consent       Pharmacy advised by    [x]Fax  []Phone call  []Secure Chat    Specialty Pharmacy    []     Approval letter scanned into Media Yes

## 2025-06-03 ENCOUNTER — TELEPHONE (OUTPATIENT)
Age: 80
End: 2025-06-03

## 2025-06-03 DIAGNOSIS — N18.2 TYPE 2 DIABETES MELLITUS WITH STAGE 2 CHRONIC KIDNEY DISEASE, WITHOUT LONG-TERM CURRENT USE OF INSULIN  (HCC): Primary | ICD-10-CM

## 2025-06-03 DIAGNOSIS — E11.22 TYPE 2 DIABETES MELLITUS WITH STAGE 2 CHRONIC KIDNEY DISEASE, WITHOUT LONG-TERM CURRENT USE OF INSULIN  (HCC): Primary | ICD-10-CM

## 2025-06-03 RX ORDER — TIRZEPATIDE 5 MG/.5ML
INJECTION, SOLUTION SUBCUTANEOUS
Qty: 2 ML | Refills: 3 | Status: SHIPPED | OUTPATIENT
Start: 2025-06-03

## 2025-06-03 NOTE — TELEPHONE ENCOUNTER
Patient called to report that she is doing well on the mounjaro and would like to move up to the next dose.

## 2025-06-05 ENCOUNTER — HOSPITAL ENCOUNTER (OUTPATIENT)
Dept: MRI IMAGING | Facility: HOSPITAL | Age: 80
Discharge: HOME | End: 2025-06-05
Attending: FAMILY MEDICINE
Payer: MEDICARE

## 2025-06-05 ENCOUNTER — TELEPHONE (OUTPATIENT)
Age: 80
End: 2025-06-05

## 2025-06-05 DIAGNOSIS — G89.29 CHRONIC NECK PAIN: ICD-10-CM

## 2025-06-05 DIAGNOSIS — M54.2 CHRONIC NECK PAIN: ICD-10-CM

## 2025-06-05 DIAGNOSIS — M53.3 SACROCOCCYGEAL PAIN: ICD-10-CM

## 2025-06-05 PROCEDURE — 72141 MRI NECK SPINE W/O DYE: CPT

## 2025-06-05 NOTE — TELEPHONE ENCOUNTER
Phone call from Flavia at [facility] calling to clarify patient's MRI scripts for todays appointment. I warm transferred and spoke to Estela who clarified with Azalea Donnelly who is covering while DR Cormier is off. Patient to have imaging as written on the orders. Message relayed to Flavia verbally understands.

## 2025-06-06 ENCOUNTER — PROCEDURE VISIT (OUTPATIENT)
Dept: PODIATRY | Facility: CLINIC | Age: 80
End: 2025-06-06
Payer: MEDICARE

## 2025-06-06 VITALS
OXYGEN SATURATION: 98 % | TEMPERATURE: 98 F | HEIGHT: 67 IN | BODY MASS INDEX: 33.59 KG/M2 | HEART RATE: 68 BPM | RESPIRATION RATE: 16 BRPM | WEIGHT: 214 LBS

## 2025-06-06 DIAGNOSIS — N18.2 TYPE 2 DIABETES MELLITUS WITH STAGE 2 CHRONIC KIDNEY DISEASE, WITHOUT LONG-TERM CURRENT USE OF INSULIN  (HCC): ICD-10-CM

## 2025-06-06 DIAGNOSIS — E11.22 TYPE 2 DIABETES MELLITUS WITH STAGE 2 CHRONIC KIDNEY DISEASE, WITHOUT LONG-TERM CURRENT USE OF INSULIN  (HCC): ICD-10-CM

## 2025-06-06 DIAGNOSIS — B35.1 ONYCHOMYCOSIS: Primary | ICD-10-CM

## 2025-06-06 PROCEDURE — 11721 DEBRIDE NAIL 6 OR MORE: CPT | Performed by: PODIATRIST

## 2025-06-06 NOTE — PROGRESS NOTES
Enedelia Davis  1945  AT RISK FOOT CARE    1. Onychomycosis        2. Type 2 diabetes mellitus with stage 2 chronic kidney disease, without long-term current use of insulin  (Formerly [facility])            Patient presents for at-risk foot care.  Patient has no acute concerns today.  Patient has significant lower extremity risk due to diminished pulses in the feet and trophic skin changes to the lower extremity including thick toenail, atrophic skin, and decreased hair growth.        On exam patient has thickened, hypertrophic, discolored, brittle toenails with subungual debris and tenderness x10      Patient has diminished pedal pulses and decreased perfusion to the lower extremities  Patient has significant trophic changes to the skin including thick toenails, decreased pedal hair and atrophic skin.      Today's treatment includes:  Debridement of toenails. Using nail nipper, buffy, and curette, nails were sharply debrided, reduced in thickness and length. Devitalized nail tissue and fungal debris excised and removed. Patient tolerated well.      Continue use of AmLactin as well as CeraVe a and Vaseline  Discussed proper shoe gear, daily inspections of feet, and general foot health with patient.   Discussed proper shoe gear, daily inspections of feet, and general foot health with patient. Patient has Q8  findings and is recommended for at risk foot care every 9-10 weeks.     Patients most recent complete clinical foot exam was on: 1/22/25

## 2025-06-10 ENCOUNTER — HOSPITAL ENCOUNTER (OUTPATIENT)
Dept: MRI IMAGING | Facility: HOSPITAL | Age: 80
Discharge: HOME | End: 2025-06-10
Attending: FAMILY MEDICINE
Payer: MEDICARE

## 2025-06-10 PROCEDURE — A9585 GADOBUTROL INJECTION: HCPCS | Performed by: FAMILY MEDICINE

## 2025-06-10 PROCEDURE — 72197 MRI PELVIS W/O & W/DYE: CPT

## 2025-06-10 RX ORDER — GADOBUTROL 604.72 MG/ML
9 INJECTION INTRAVENOUS
Status: COMPLETED | OUTPATIENT
Start: 2025-06-10 | End: 2025-06-10

## 2025-06-10 RX ADMIN — GADOBUTROL 9 ML: 604.72 INJECTION INTRAVENOUS at 14:35

## 2025-06-11 ENCOUNTER — RESULTS FOLLOW-UP (OUTPATIENT)
Dept: INTERNAL MEDICINE CLINIC | Facility: CLINIC | Age: 80
End: 2025-06-11

## 2025-06-11 DIAGNOSIS — M47.812 SPONDYLOSIS, CERVICAL: Primary | ICD-10-CM

## 2025-06-13 PROBLEM — H16.229 KERATOCONJUNCTIVITIS SICCA NOT DUE TO SJOGREN'S SYNDROME: Status: RESOLVED | Noted: 2022-04-21 | Resolved: 2025-06-13

## 2025-06-13 PROBLEM — L71.8 ROSACEA CONJUNCTIVITIS: Status: RESOLVED | Noted: 2022-04-21 | Resolved: 2025-06-13

## 2025-06-13 PROBLEM — H10.829 ROSACEA CONJUNCTIVITIS: Status: RESOLVED | Noted: 2022-04-21 | Resolved: 2025-06-13

## 2025-06-23 DIAGNOSIS — E11.9 TYPE 2 DIABETES MELLITUS WITHOUT COMPLICATION, WITHOUT LONG-TERM CURRENT USE OF INSULIN (HCC): ICD-10-CM

## 2025-07-07 DIAGNOSIS — E03.9 ACQUIRED HYPOTHYROIDISM: ICD-10-CM

## 2025-07-09 RX ORDER — LEVOTHYROXINE SODIUM 75 UG/1
TABLET ORAL
Qty: 90 TABLET | Refills: 3 | Status: SHIPPED | OUTPATIENT
Start: 2025-07-09

## 2025-07-22 NOTE — PROGRESS NOTES
"Medical Nutrition Therapy        Assessment    Visit Type: Follow-up visit  Chief complaint/Medical Diagnosis/reason for visit E11.22, N18.2      DAWIT Mcdaniels was seen in person for the follow-up MNT appointment. Patient was pleasant and willing to share assessment information. BG levels are checked 1x/day. FBG in the morning is usually  mg/dL. No new A1c or cholesterol panel to review. GFR and UACR are at goal. Patient does take diabetes medication as prescribed.     Patient continues to exercise. She performs cardio and resistance by utilizing the [employer] program.  She also walks twice weekly for 2 miles each session. Noted 2.4% (5 lb) intentional weight loss in a 2 month time duration.    Problems identified in food recall include meal skipping, inconsistent carbohydrate intake, high-fat and high-sodium convenience foods, high-fat dairy, limited non-starchy vegetables and whole grains, sugary beverages, and sweets. Consumption of carbohydrates ranges from ~25 to ~45 grams per meal.     Portion booklet and food labels were used to teach basic carbohydrate counting. Patient agreed to keep daily food logs and return them in 6 months for assessment. RD will remain available for further dietary questions/concerns.     Ht Readings from Last 1 Encounters:   06/06/25 5' 7\" (1.702 m)     Wt Readings from Last 3 Encounters:   06/06/25 97.1 kg (214 lb)   05/21/25 97.3 kg (214 lb 6.4 oz)   05/20/25 97.9 kg (215 lb 12.8 oz)     Weight Change: Yes 2.4% (5 lb) intentional weight loss in a 2 month time duration.    Barriers to Learning: no barriers    Do you follow any special diet presently?: Yes - Celiac (GF)  Who shops: patient  Who cooks: patient    Food Log: Please see scanned log    Calorie needs 1285 kcals/day           Carbs: 30 g/meal, 15-20 g/snack           Protein:5 ounces/day    Fat: 3 servings/day        Nutrition Diagnosis:  Inconsistent carbohydrate intake  intake related to Food and nutrition related " knowledge deficit concerning appropriate amount and timing of carbohydrate intake as evidenced by  Estimated carbohydrate intake that is different from recommended types or ingested on an irregular basis    Intervention: carbohydrate counting and increased protein intake     Treatment Goals: Patient understands education and recommendations, Patient will monitor food intake daily with tracker, Patient will consume 3 meals a day, Patient will count carbohydrates, Patient will exercise, and Patient will monitor blood glucose    Monitoring and evaluation:    Term code indicator  FH 1.6.2 Protein Intake Criteria: Pair your carbohydrate snack with either lean protein or healthy fat.  Term code indicator  FH 1.6.3 Carbohydrate Intake Criteria: Eat 30 grams of carbohydrate per meal, and no more than 15-20 grams per snack.    Materials Provided: portion book, 3-day food log    Patient’s Response to Instruction:  Comprehensiongood  Motivationgood  Expected Compliancegood    Begin Time: 9:12 am  End Time: 9:45 am  Referring Provider: Jose Carlos Ray PA-C     Thank you for coming to the [facility] for education today.  Please feel free to call with any questions or concerns.    Lakia Walters, RD  [address] [place] [address] [phone]

## 2025-07-24 ENCOUNTER — OFFICE VISIT (OUTPATIENT)
Dept: ENDOCRINOLOGY | Facility: OTHER | Age: 80
End: 2025-07-24
Payer: MEDICARE

## 2025-07-24 VITALS — WEIGHT: 209.8 LBS | BODY MASS INDEX: 32.86 KG/M2

## 2025-07-24 DIAGNOSIS — E11.65 TYPE 2 DIABETES MELLITUS WITH HYPERGLYCEMIA, WITH LONG-TERM CURRENT USE OF INSULIN (HCC): Primary | ICD-10-CM

## 2025-07-24 DIAGNOSIS — Z79.4 TYPE 2 DIABETES MELLITUS WITH HYPERGLYCEMIA, WITH LONG-TERM CURRENT USE OF INSULIN (HCC): Primary | ICD-10-CM

## 2025-07-24 PROCEDURE — 97803 MED NUTRITION INDIV SUBSEQ: CPT

## 2025-07-24 NOTE — PATIENT INSTRUCTIONS
Pair your carbohydrate snack with either lean protein or healthy fat.    Eat 30 grams of carbohydrate per meal, and no more than 15-20 grams per snack.    Complete 3-day food log and return completed log at the follow up appointment

## 2025-07-31 ENCOUNTER — TELEPHONE (OUTPATIENT)
Age: 80
End: 2025-07-31

## 2025-08-15 ENCOUNTER — APPOINTMENT (OUTPATIENT)
Dept: LAB | Facility: HOSPITAL | Age: 80
End: 2025-08-15
Payer: MEDICARE

## 2025-08-15 ENCOUNTER — PROCEDURE VISIT (OUTPATIENT)
Dept: PODIATRY | Facility: CLINIC | Age: 80
End: 2025-08-15
Payer: MEDICARE

## 2025-08-15 VITALS
OXYGEN SATURATION: 98 % | HEIGHT: 67 IN | HEART RATE: 73 BPM | RESPIRATION RATE: 16 BRPM | TEMPERATURE: 97.2 F | BODY MASS INDEX: 32.8 KG/M2 | WEIGHT: 209 LBS

## 2025-08-15 DIAGNOSIS — E11.22 TYPE 2 DIABETES MELLITUS WITH STAGE 2 CHRONIC KIDNEY DISEASE, WITHOUT LONG-TERM CURRENT USE OF INSULIN  (HCC): Primary | ICD-10-CM

## 2025-08-15 DIAGNOSIS — B35.1 ONYCHOMYCOSIS: ICD-10-CM

## 2025-08-15 DIAGNOSIS — N18.2 TYPE 2 DIABETES MELLITUS WITH STAGE 2 CHRONIC KIDNEY DISEASE, WITHOUT LONG-TERM CURRENT USE OF INSULIN  (HCC): Primary | ICD-10-CM

## 2025-08-15 PROCEDURE — 11721 DEBRIDE NAIL 6 OR MORE: CPT | Performed by: PODIATRIST

## 2025-08-19 ENCOUNTER — OFFICE VISIT (OUTPATIENT)
Dept: INTERNAL MEDICINE CLINIC | Facility: CLINIC | Age: 80
End: 2025-08-19
Payer: MEDICARE

## 2025-08-19 VITALS
WEIGHT: 210.3 LBS | HEART RATE: 70 BPM | HEIGHT: 67 IN | DIASTOLIC BLOOD PRESSURE: 62 MMHG | BODY MASS INDEX: 33.01 KG/M2 | TEMPERATURE: 96.9 F | SYSTOLIC BLOOD PRESSURE: 130 MMHG | OXYGEN SATURATION: 99 %

## 2025-08-19 DIAGNOSIS — N18.2 TYPE 2 DIABETES MELLITUS WITH STAGE 2 CHRONIC KIDNEY DISEASE, WITHOUT LONG-TERM CURRENT USE OF INSULIN  (HCC): Primary | ICD-10-CM

## 2025-08-19 DIAGNOSIS — E78.2 MIXED HYPERLIPIDEMIA: ICD-10-CM

## 2025-08-19 DIAGNOSIS — I10 ESSENTIAL HYPERTENSION: ICD-10-CM

## 2025-08-19 DIAGNOSIS — E11.22 TYPE 2 DIABETES MELLITUS WITH STAGE 2 CHRONIC KIDNEY DISEASE, WITHOUT LONG-TERM CURRENT USE OF INSULIN  (HCC): Primary | ICD-10-CM

## 2025-08-19 DIAGNOSIS — E55.9 VITAMIN D DEFICIENCY: ICD-10-CM

## 2025-08-19 DIAGNOSIS — E03.9 ACQUIRED HYPOTHYROIDISM: ICD-10-CM

## 2025-08-19 PROCEDURE — G2211 COMPLEX E/M VISIT ADD ON: HCPCS | Performed by: FAMILY MEDICINE

## 2025-08-19 PROCEDURE — 99214 OFFICE O/P EST MOD 30 MIN: CPT | Performed by: FAMILY MEDICINE

## 2025-08-20 PROBLEM — N18.31 STAGE 3A CHRONIC KIDNEY DISEASE (HCC): Status: RESOLVED | Noted: 2025-01-14 | Resolved: 2025-08-20

## 2025-08-20 PROBLEM — R19.5 CHANGE IN CONSISTENCY OF STOOL: Status: RESOLVED | Noted: 2024-04-30 | Resolved: 2025-08-20

## (undated) DEVICE — PREMIUM DRY TRAY LF: Brand: MEDLINE INDUSTRIES, INC.

## (undated) DEVICE — SCD SEQUENTIAL COMPRESSION COMFORT SLEEVE MEDIUM KNEE LENGTH: Brand: KENDALL SCD

## (undated) DEVICE — UNDER BUTTOCKS DRAPE W/FLUID CONTROL POUCH: Brand: CONVERTORS

## (undated) DEVICE — STRL ALLENTOWN HYSTEROSCOPY PK: Brand: CARDINAL HEALTH

## (undated) DEVICE — 2000CC GUARDIAN II: Brand: GUARDIAN

## (undated) DEVICE — DRAPE EQUIPMENT RF WAND

## (undated) DEVICE — TISSUE REMOVAL SYSTEM FLUID MANAGEMENT ACCESSORIES: Brand: SYMPHION

## (undated) DEVICE — PVC URETHRAL CATHETER: Brand: DOVER

## (undated) DEVICE — GLOVE PI ULTRA TOUCH SZ.7.5